# Patient Record
Sex: MALE | Race: WHITE | NOT HISPANIC OR LATINO | Employment: OTHER | URBAN - METROPOLITAN AREA
[De-identification: names, ages, dates, MRNs, and addresses within clinical notes are randomized per-mention and may not be internally consistent; named-entity substitution may affect disease eponyms.]

---

## 2017-04-11 ENCOUNTER — HOSPITAL ENCOUNTER (EMERGENCY)
Facility: CLINIC | Age: 22
Discharge: HOME | End: 2017-04-11
Attending: FAMILY MEDICINE | Admitting: FAMILY MEDICINE
Payer: COMMERCIAL

## 2017-04-11 VITALS
TEMPERATURE: 97.4 F | DIASTOLIC BLOOD PRESSURE: 76 MMHG | OXYGEN SATURATION: 97 % | RESPIRATION RATE: 16 BRPM | SYSTOLIC BLOOD PRESSURE: 144 MMHG

## 2017-04-11 DIAGNOSIS — K52.9 ACUTE GASTROENTERITIS: ICD-10-CM

## 2017-04-11 PROCEDURE — 25000125 ZZHC RX 250: Performed by: FAMILY MEDICINE

## 2017-04-11 PROCEDURE — 99283 EMERGENCY DEPT VISIT LOW MDM: CPT | Performed by: FAMILY MEDICINE

## 2017-04-11 PROCEDURE — 99283 EMERGENCY DEPT VISIT LOW MDM: CPT

## 2017-04-11 RX ORDER — ONDANSETRON 4 MG/1
4 TABLET, ORALLY DISINTEGRATING ORAL ONCE
Status: COMPLETED | OUTPATIENT
Start: 2017-04-11 | End: 2017-04-11

## 2017-04-11 RX ORDER — ONDANSETRON 4 MG/1
4 TABLET, ORALLY DISINTEGRATING ORAL EVERY 6 HOURS PRN
Qty: 5 TABLET | Refills: 0 | Status: SHIPPED | OUTPATIENT
Start: 2017-04-11 | End: 2018-03-13

## 2017-04-11 RX ADMIN — ONDANSETRON 4 MG: 4 TABLET, ORALLY DISINTEGRATING ORAL at 11:19

## 2017-04-11 ASSESSMENT — ENCOUNTER SYMPTOMS
CONSTIPATION: 0
VOMITING: 1
COUGH: 0
FREQUENCY: 0
SINUS PRESSURE: 0
FEVER: 0
ABDOMINAL PAIN: 1
DIARRHEA: 1
DIAPHORESIS: 0
SORE THROAT: 0
PALPITATIONS: 0
NAUSEA: 1
CHILLS: 1
WHEEZING: 0
DYSURIA: 0
BLOOD IN STOOL: 0
SHORTNESS OF BREATH: 0
HEADACHES: 0

## 2017-04-11 NOTE — ED PROVIDER NOTES
History     Chief Complaint   Patient presents with     Nausea, Vomiting, & Diarrhea     Started getting sick this morning around 0800.  Having abdominal pain since then too.  Pt has autism, difficult to assess, has high pain tolerance.  Ate normally yesterday.     HPI  Shola Owens is a 21 year old male with a history of autism and seizure disorder who presents with nausea, vomiting, and diarrhea. Patient is a difficult historian due to autism and  History is reported by his father and mother.  Yesterday the patient was feeling well. 3 episodes of vomiting followed by abdominal cramping and multiple diarrheal stools  overnght. emesis clear without blood. . Pepto bismol alleviated his symptoms slightly.  developed chills this morning.  cough for the last few weeks - relatively mild  No fever. No recent travel. No bad food intake.   No contagious contacts.   Immunizations are up to date.     Current Outpatient Prescriptions   Medication Sig Dispense Refill     lamoTRIgine (LAMICTAL) 100 MG tablet TAKE 1 TAB TWICE DAILY (beginning on 5/2/16). 60 tablet 11     divalproex (DEPAKOTE ER) 500 MG 24 hr tablet Take 500 mg in AM, 1000 mg in PM. 90 tablet 11     cetirizine (ZYRTEC) 10 MG tablet Take 1 tablet (10 mg) by mouth daily 100 tablet 3     Multiple Vitamin (MULTIVITAMINS PO)        guanFACINE (TENEX) 1 MG tablet Take 1 tablet (1 mg) by mouth 2 times daily 60 tablet 0     ziprasidone (GEODON) 40 MG capsule Take 1 capsule (40 mg) by mouth 2 times daily (with meals) AM 60 capsule 0     ziprasidone (GEODON) 80 MG capsule Take 1 capsule (80 mg) by mouth 2 times daily (with meals) AM AND PM 60 capsule 0     montelukast (SINGULAIR) 10 MG tablet Take 1 tablet (10 mg) by mouth At Bedtime 30 tablet 11     omeprazole (PRILOSEC) 20 MG capsule Take 1 capsule (20 mg) by mouth daily 30 capsule 11     Patient Active Problem List   Diagnosis     Active autistic disorder     Disturbance in sleep behavior     Seizure     Zygomatic  fracture, unspecified, initial encounter for closed fracture     Closed fracture of maxilla (H)        Allergies   Allergen Reactions     Risperidone Hives     I have reviewed the Medications, Allergies, Past Medical and Surgical History, and Social History in the Epic system.    Review of Systems   Constitutional: Positive for chills. Negative for diaphoresis and fever.   HENT: Negative for ear pain, sinus pressure and sore throat.    Eyes: Negative for visual disturbance.   Respiratory: Negative for cough, shortness of breath and wheezing.    Cardiovascular: Negative for chest pain and palpitations.   Gastrointestinal: Positive for abdominal pain, diarrhea, nausea and vomiting. Negative for blood in stool and constipation.   Genitourinary: Negative for dysuria, frequency and urgency.   Skin: Negative for rash.   Neurological: Negative for headaches.   All other systems reviewed and are negative.      Physical Exam   BP: 112/74  Heart Rate: 82  Temp: 97.4  F (36.3  C)  Resp: 16  SpO2: 100 %    Physical Exam   Constitutional: No distress.   HENT:   Right Ear: Tympanic membrane normal.   Left Ear: Tympanic membrane normal.   Mouth/Throat: Oropharynx is clear and moist and mucous membranes are normal.   Eyes: Conjunctivae are normal.   Neck: Neck supple.   Cardiovascular: Normal rate, regular rhythm and normal heart sounds.  Exam reveals no gallop and no friction rub.    No murmur heard.  Pulmonary/Chest: Effort normal and breath sounds normal. No respiratory distress. He has no wheezes. He has no rales.   Abdominal: Soft. Bowel sounds are normal. He exhibits no distension. There is no tenderness. There is no rebound and no guarding.   Musculoskeletal: He exhibits no edema.   Neurological: He is alert.   Skin: No rash noted. He is not diaphoretic.     Periodic chills.     ED Course     ED Course     Procedures             Critical Care time:  none               No results found for this or any previous visit (from the  past 24 hour(s)).    Medications   ondansetron (ZOFRAN-ODT) ODT tab 4 mg (4 mg Oral Given 4/11/17 1119)     11:31 AM patient assessed.     Assessments & Plan (with Medical Decision Making)     MDM: Shola Owens is a 21 year old male With a history of autism and accompanied by his family   who presents with diarrhea and vomiting and abdominal cramping. This is at onset over the evening hours and appears non-toxic and afebrile. He is alerts. After Zofran he continues to want to drink liquids.  has had stool incontinence.  There is no finding of dysentery has no blood in stools.   Likely food poisoning or viral gastroenteritis. We discussed obtaining labs but at this point there are no serious findings and is given precautions for return. Oral Zofran is sent to pharmacy.  He does have an underlying seizure disorder and is on several epilepsy agents including Depakote and Lamictal.  Levels Have been done relatively recently and were in range per family.        I have reviewed the nursing notes.    I have reviewed the findings, diagnosis, plan and need for follow up with the patient.    New Prescriptions    No medications on file       Final diagnoses:   Acute gastroenteritis - Use zofran orally every 6 hours as needed for nausea/vomiting.  replace diarrhea with electrolyte replacement solution (gatorade ok).  return for abd pain, bloody diarrhea, fever, worsening.  64 oz fluid/day     This document serves as a record of the services and decisions personally performed and made by Florencio Chapin MD. It was created on HIS/HER behalf by Kizzy Canela, a trained medical scribe. The creation of this document is based the provider's statements to the medical scribe.  Kizzy Canela 11:31 AM 4/11/2017    Provider:   The information in this document, created by the medical scribe for me, accurately reflects the services I personally performed and the decisions made by me. I have reviewed and approved this document for accuracy  prior to leaving the patient care area.  Florencio Chapin MD 11:31 AM 4/11/2017 4/11/2017   [facility] EMERGENCY DEPARTMENT     Florencio Chapin MD  04/11/17 2001       Florencio Chapin MD  04/11/17 2001

## 2017-04-11 NOTE — DISCHARGE INSTRUCTIONS
"  ICD-10-CM    1. Acute gastroenteritis K52.9     Use zofran orally every 6 hours as needed for nausea/vomiting.  replace diarrhea with electrolyte replacement solution (gatorade ok).  return for abd pain, bloody diarrhea, fever, worsening.  64 oz fluid/day          * FOOD POISONING or VIRAL GASTROENTERITIS (6yr-Adult)  FOOD POISONING may occur from 6 to 24 hours after eating food that has spoiled and lasts up to1-2 days. VIRAL GASTRO-ENTERITIS is commonly known as the \"stomach flu\" and may last 2-7 days. Symptoms of both illnesses may include vomiting, diarrhea, fever, abdominal cramping. Antibiotics are not effective, but simple home treatment will be helpful.  HOME CARE:    If symptoms are severe, rest at home for the next 24 hours.    Avoid tobacco and alcohol. These may worsen your symptoms.    If medicines for diarrhea (low dose of Immodium: one tablet a day for an adult) or vomiting were prescribed, take only as directed.   During the first 12 to 24 hours follow the diet below:    DRINKS: Sport drinks like Gatorade, soft drinks without caffeine; ginger ale, mineral water (plain or flavored), decaffeinated tea and coffee.    SOUPS: Clear broth, consommé and bouillon    DESSERTS: Plain gelatin (Jell-O), popsicles and fruit juice bars.  During the next 24 hours you may add the following to the above:    Hot cereal, plain toast, bread, rolls, crackers    Plain noodles, rice, mashed potatoes, chicken noodle or rice soup    Unsweetened canned fruit (avoid pineapple), bananas    Limit fat intake to less than 15 grams per day by avoiding margarine, butter, oils, mayonnaise, sauces, gravies, fried foods, peanut butter, meat, poultry and fish.    Limit fiber; avoid raw or cooked vegetables, fresh fruits (except bananas) and bran cereals.    Limit caffeine and chocolate. No spices or seasonings except salt.  Slowly go back to a normal diet as you feel better and your symptoms lessen.  FOLLOW UP with your doctor as " advised if you are not better in 2 days. If a stool (diarrhea) sample was taken, you may call in 2 days (or as directed) for the results.  GET PROMPT MEDICAL ATTENTION if any of the following occur:    Increasing abdominal pain or constant pain in one spot    Continued vomiting (unable to keep liquids down)    Frequent diarrhea (more than 5 times a day)    Blood in vomit or stool (black or red color)    Unable to take in fluids at all    No urine output for 12 hours or extreme thirst    Weakness, dizziness, fainting    Drowsiness, confusion, stiff neck or seizure    Fever over 101.0  F (38.3  C) for more than 3 days    New dorothea    8967-5463 [facility], [address] [place] [address], [place] [address]. All rights reserved. This information is not intended as a substitute for professional medical care. Always follow your healthcare professional's instructions.

## 2017-04-11 NOTE — ED AVS SNAPSHOT
[facility] Emergency Department    [address] [address]    Phone:  [phone]    Fax:  [phone]                                       Shola Owens   MRN: [id number]    Department:  [facility] Emergency Department   Date of Visit:  4/11/2017           After Visit Summary Signature Page     I have received my discharge instructions, and my questions have been answered. I have discussed any challenges I see with this plan with the nurse or doctor.    ..........................................................................................................................................  Patient/Patient Representative Signature      ..........................................................................................................................................  Patient Representative Print Name and Relationship to Patient    ..................................................               ................................................  Date                                            Time    ..........................................................................................................................................  Reviewed by Signature/Title    ...................................................              ..............................................  Date                                                            Time

## 2017-04-11 NOTE — ED AVS SNAPSHOT
" [facility] Emergency Department    [address] [address]    Phone:  [phone]    Fax:  [phone]                                       Shola Owens   MRN: [id number]    Department:  [facility] Emergency Department   Date of Visit:  4/11/2017           Patient Information     Date Of Birth          1995        Your diagnoses for this visit were:     Acute gastroenteritis Use zofran orally every 6 hours as needed for nausea/vomiting.  replace diarrhea with electrolyte replacement solution (gatorade ok).  return for abd pain, bloody diarrhea, fever, worsening.  64 oz fluid/day       You were seen by Florencio Chapin MD.      Follow-up Information     Follow up with Shola Nelson MD In 1 week.    Specialty:  Family Practice    Contact information:    [address] [address]  [phone]          Follow up with [facility] Emergency Department.    Specialty:  EMERGENCY MEDICINE    Why:  As needed, If symptoms worsen    Contact information:    [address] [place] [address] [phone]    Additional information:    The medical center is located at   [address] [place] [address] (between [address]   [address] in [place], four miles north   of [place]).        Discharge Instructions         ICD-10-CM    1. Acute gastroenteritis K52.9     Use zofran orally every 6 hours as needed for nausea/vomiting.  replace diarrhea with electrolyte replacement solution (gatorade ok).  return for abd pain, bloody diarrhea, fever, worsening.  64 oz fluid/day          * FOOD POISONING or VIRAL GASTROENTERITIS (6yr-Adult)  FOOD POISONING may occur from 6 to 24 hours after eating food that has spoiled and lasts up to1-2 days. VIRAL GASTRO-ENTERITIS is commonly known as the \"stomach flu\" and may last 2-7 days. Symptoms of both illnesses may include vomiting, diarrhea, fever, abdominal cramping. Antibiotics are not effective, but simple " home treatment will be helpful.  HOME CARE:    If symptoms are severe, rest at home for the next 24 hours.    Avoid tobacco and alcohol. These may worsen your symptoms.    If medicines for diarrhea (low dose of Immodium: one tablet a day for an adult) or vomiting were prescribed, take only as directed.   During the first 12 to 24 hours follow the diet below:    DRINKS: Sport drinks like Gatorade, soft drinks without caffeine; ginger ale, mineral water (plain or flavored), decaffeinated tea and coffee.    SOUPS: Clear broth, consommé and bouillon    DESSERTS: Plain gelatin (Jell-O), popsicles and fruit juice bars.  During the next 24 hours you may add the following to the above:    Hot cereal, plain toast, bread, rolls, crackers    Plain noodles, rice, mashed potatoes, chicken noodle or rice soup    Unsweetened canned fruit (avoid pineapple), bananas    Limit fat intake to less than 15 grams per day by avoiding margarine, butter, oils, mayonnaise, sauces, gravies, fried foods, peanut butter, meat, poultry and fish.    Limit fiber; avoid raw or cooked vegetables, fresh fruits (except bananas) and bran cereals.    Limit caffeine and chocolate. No spices or seasonings except salt.  Slowly go back to a normal diet as you feel better and your symptoms lessen.  FOLLOW UP with your doctor as advised if you are not better in 2 days. If a stool (diarrhea) sample was taken, you may call in 2 days (or as directed) for the results.  GET PROMPT MEDICAL ATTENTION if any of the following occur:    Increasing abdominal pain or constant pain in one spot    Continued vomiting (unable to keep liquids down)    Frequent diarrhea (more than 5 times a day)    Blood in vomit or stool (black or red color)    Unable to take in fluids at all    No urine output for 12 hours or extreme thirst    Weakness, dizziness, fainting    Drowsiness, confusion, stiff neck or seizure    Fever over 101.0  F (38.3  C) for more than 3 days    New rash     [id number] [facility], [address] [place] [address], [place] [address]. All rights reserved. This information is not intended as a substitute for professional medical care. Always follow your healthcare professional's instructions.      Future Appointments        Provider Department Dept Phone Center    6/15/2017 9:30 AM Austen Lemus MD [facility] [phone] [facility] Own[facility]      24 Hour Appointment Hotline       To make an appointment at any [facility], call [phone] ([phone]). If you don't have a family doctor or clinic, we will help you find one. [facility] clinics are conveniently located to serve the needs of you and your family.             Review of your medicines      START taking        Dose / Directions Last dose taken    ondansetron 4 MG ODT tab   Commonly known as:  ZOFRAN-ODT   Dose:  4 mg   Quantity:  5 tablet        Take 1 tablet (4 mg) by mouth every 6 hours as needed for nausea   Refills:  0          Our records show that you are taking the medicines listed below. If these are incorrect, please call your family doctor or clinic.        Dose / Directions Last dose taken    cetirizine 10 MG tablet   Commonly known as:  zyrTEC   Dose:  10 mg   Quantity:  100 tablet        Take 1 tablet (10 mg) by mouth daily   Refills:  3        divalproex 500 MG 24 hr tablet   Commonly known as:  DEPAKOTE ER   Quantity:  90 tablet        Take 500 mg in AM, 1000 mg in PM.   Refills:  11        guanFACINE 1 MG tablet   Commonly known as:  TENEX   Dose:  1 mg   Quantity:  60 tablet        Take 1 tablet (1 mg) by mouth 2 times daily   Refills:  0        lamoTRIgine 100 MG tablet   Commonly known as:  LAMICTAL   Quantity:  60 tablet        TAKE 1 TAB TWICE DAILY (beginning on 5/2/16).   Refills:  11        montelukast 10 MG tablet   Commonly known as:  SINGULAIR   Dose:  10 mg   Quantity:  30 tablet        Take 1 tablet (10 mg) by mouth At Bedtime   Refills:  11        MULTIVITAMINS PO        Refills:  0         omeprazole 20 MG CR capsule   Commonly known as:  priLOSEC   Dose:  20 mg   Quantity:  30 capsule        Take 1 capsule (20 mg) by mouth daily   Refills:  11        * ziprasidone 40 MG capsule   Commonly known as:  GEODON   Dose:  40 mg   Quantity:  60 capsule        Take 1 capsule (40 mg) by mouth 2 times daily (with meals) AM   Refills:  0        * ziprasidone 80 MG capsule   Commonly known as:  GEODON   Dose:  80 mg   Quantity:  60 capsule        Take 1 capsule (80 mg) by mouth 2 times daily (with meals) AM AND PM   Refills:  0        * Notice:  This list has 2 medication(s) that are the same as other medications prescribed for you. Read the directions carefully, and ask your doctor or other care provider to review them with you.            Prescriptions were sent or printed at these locations (1 Prescription)                   [id number] [place], [place] - [address]   [address] [address]    Telephone:  [phone]   Fax:  [phone]   Hours:  Closed 10-16-08 business to [facility]                E-Prescribed (1 of 1)         ondansetron (ZOFRAN-ODT) 4 MG ODT tab                Orders Needing Specimen Collection     None      Pending Results     No orders found from 4/9/2017 to 4/12/2017.            Pending Culture Results     No orders found from 4/9/2017 to 4/12/2017.            Test Results From Your Hospital Stay               Thank you for choosing [facility]       Thank you for choosing [facility] for your care. Our goal is always to provide you with excellent care. Hearing back from our patients is one way we can continue to improve our services. Please take a few minutes to complete the written survey that you may receive in the mail after you visit with us. Thank you!        [facility] Information     [employer] lets you send messages to your doctor, view your test results, renew your prescriptions, schedule appointments and more. To sign up, go to  [email or web address] . Click on \"Log in\" on the left side of the screen, which will take you to the Welcome page. Then click on \"Sign up Now\" on the right side of the page.     You will be asked to enter the access code listed below, as well as some personal information. Please follow the directions to create your username and password.     Your access code is: [email or web address]  Expires: 7/10/2017 12:00 PM     Your access code will  in 90 days. If you need help or a new code, please call your [facility] clinic or [phone].        Care EveryWhere ID     This is your Care EveryWhere ID. This could be used by other organizations to access your [facility] medical records  [id number]        After Visit Summary       This is your record. Keep this with you and show to your community pharmacist(s) and doctor(s) at your next visit.                  "

## 2017-04-11 NOTE — ED NOTES
Pt from home with mom and brother. Vomiting started @ 0800 and diarrhea. Pt c/o abdominal pain @ umbilicus. Mom concerned for appendicitis and unsure if able to keep epilepsy meds down. Brought in. Discussed pt's situation and autism hx with MD. No line started, will attempt oral zofran and fluids

## 2017-04-21 DIAGNOSIS — K21.9 GASTROESOPHAGEAL REFLUX DISEASE WITHOUT ESOPHAGITIS: ICD-10-CM

## 2017-04-21 DIAGNOSIS — J30.2 SEASONAL ALLERGIC RHINITIS: Primary | ICD-10-CM

## 2017-04-21 NOTE — TELEPHONE ENCOUNTER
Omeprazole      Last Written Prescription Date: 04/07/16  Last Fill Quantity: 30,  # refills: 11   Last Office Visit with [facility], [facility] or [facility] prescribing provider: 06/06/16  [place]       Last Written Prescription Date: 04/07/16  Last Fill Quantity: 30, # refills: 11    Last Office Visit with [facility], [facility] or [facility] prescribing provider:  06/06/16   Future Office Visit:       Date of Last Asthma Action Plan Letter:   There are no preventive care reminders to display for this patient.   Asthma Control Test: No flowsheet data found.    Date of Last Spirometry Test:   No results found for this or any previous visit.

## 2017-04-24 RX ORDER — MONTELUKAST SODIUM 10 MG/1
10 TABLET ORAL AT BEDTIME
Qty: 30 TABLET | Refills: 1 | Status: SHIPPED | OUTPATIENT
Start: 2017-04-24 | End: 2017-06-15

## 2017-04-24 NOTE — TELEPHONE ENCOUNTER
Prescription approved per [facility] Refill Protocol.    Usha NOBLE RN

## 2017-06-15 ENCOUNTER — OFFICE VISIT (OUTPATIENT)
Dept: NEUROLOGY | Facility: CLINIC | Age: 22
End: 2017-06-15

## 2017-06-15 VITALS
HEIGHT: 72 IN | HEART RATE: 58 BPM | SYSTOLIC BLOOD PRESSURE: 143 MMHG | DIASTOLIC BLOOD PRESSURE: 71 MMHG | BODY MASS INDEX: 27.79 KG/M2 | WEIGHT: 205.2 LBS

## 2017-06-15 DIAGNOSIS — G40.909 SEIZURE DISORDER (H): Primary | ICD-10-CM

## 2017-06-15 DIAGNOSIS — K21.9 GASTROESOPHAGEAL REFLUX DISEASE WITHOUT ESOPHAGITIS: ICD-10-CM

## 2017-06-15 RX ORDER — LAMOTRIGINE 100 MG/1
TABLET ORAL
Qty: 60 TABLET | Refills: 11 | Status: SHIPPED | OUTPATIENT
Start: 2017-06-15 | End: 2017-12-14

## 2017-06-15 RX ORDER — DIVALPROEX SODIUM 500 MG/1
TABLET, EXTENDED RELEASE ORAL
Qty: 90 TABLET | Refills: 11 | Status: SHIPPED | OUTPATIENT
Start: 2017-06-15 | End: 2017-12-14

## 2017-06-15 NOTE — PATIENT INSTRUCTIONS
Times of Days am pm        Medication Tablet Size Number of Tablets/Capsules Total Daily Dosage    Depakote 500   1 2        1500 mg    Lamictal  100 1 1        200 mg                                                                                                                 Carry this with you at all times.  CONTINUE TAKING YOUR OTHER MEDICATIONS AS PREVIOUSLY DIRECTED.      * * *Do not store medications in the bathroom.  Keep medications away from children!* * *

## 2017-06-15 NOTE — LETTER
6/15/2017       RE: Shola Owens  : 1995   MRN: [id number]      Dear Colleague,    Thank you for referring your patient, Shola Owens, to the [facility] at [facility]. Please see a copy of my visit note below.    CHIEF COMPLAINT: Seizures.    HISTORY OF PRESENT ILLNESS: This patient is a 22-year-old, right-handed male with a history of autistic spectrum disorder returns for follow up for seizure. He is accompanied by his father in the clinic today.  The patient himself cannot provide much history, and the majority of the history is provided by his father.  Patient had new onset seizure in 2015, another seizure 10 days after that,  both were described as GTCs.  He was initially started on keppra.  It was topped because of GI side effects.  Then he was started on Lamotrigine and Depakote was added later.  He is currently on Lamictal 100mg bid and Depakote 500-1000.  He had behavioral problems when he was on Lamictal 150mg bid.  Sine the last visit, he had no seizures.  His behavior has been really good since the last visit.  Family is really happy with the seizure and behavior control.  His last seizure was on 2016 that caused fractures of the right zygomatic arch, maxillary sinus and orbit.       According to the mother, on 2015, the patient was found at home on the kitchen floor. He was cyanotic. He had foaming at the mouth. He had some bleeding from the nose. He was unresponsive, and he had some tonic-clonic movement of his extremities. The clonic-tonic movement lasted for about 1 minute. The mother did not see the beginning of the event. There was no loss of bowel or bladder control. The patient did not bite his tongue. Afterwards the patient was very lethargic, still unresponsive until he was in the ambulance; then he was able to recognize his father and was able to communicate a little bit.    The patient had no prior history of seizures. He  has been taking Geodon for 7 years. No recent medication changes. No recent illness. When EMS arrived at the scene, the blood sugar was found to be 81.     TRIGGERS FOR SEIZURES: Unclear.    RISK FACTORS FOR SEIZURES: He has a history of autistic spectrum disorder. No history of head trauma with loss of consciousness. No history of CNS infection. No history of febrile convulsions.     Current Outpatient Prescriptions   Medication Sig Dispense Refill     montelukast (SINGULAIR) 10 MG tablet Take 1 tablet (10 mg) by mouth At Bedtime 30 tablet 1     omeprazole (PRILOSEC) 20 MG CR capsule Take 1 capsule (20 mg) by mouth daily 30 capsule 1     ondansetron (ZOFRAN-ODT) 4 MG ODT tab Take 1 tablet (4 mg) by mouth every 6 hours as needed for nausea 5 tablet 0     lamoTRIgine (LAMICTAL) 100 MG tablet TAKE 1 TAB TWICE DAILY (beginning on 5/2/16). 60 tablet 11     divalproex (DEPAKOTE ER) 500 MG 24 hr tablet Take 500 mg in AM, 1000 mg in PM. 90 tablet 11     cetirizine (ZYRTEC) 10 MG tablet Take 1 tablet (10 mg) by mouth daily 100 tablet 3     Multiple Vitamin (MULTIVITAMINS PO)        guanFACINE (TENEX) 1 MG tablet Take 1 tablet (1 mg) by mouth 2 times daily 60 tablet 0     ziprasidone (GEODON) 40 MG capsule Take 1 capsule (40 mg) by mouth 2 times daily (with meals) AM 60 capsule 0     ziprasidone (GEODON) 80 MG capsule Take 1 capsule (80 mg) by mouth 2 times daily (with meals) AM AND PM 60 capsule 0       PAST MEDICAL HISTORY: Autistic disorder, sleep disturbances.    FAMILY HISTORY: No family history of seizures. Grandmother had a history of migraine headaches. Mother had a history of anxiety and panic attacks. Father had a history of chemical dependency.    SOCIAL HISTORY: He is living with his parents. He had special education. He is single. No alcohol, no drug abuse, no smoking. He is not working.    PHYSICAL EXAMINATION:   Blood pressure 143/71, pulse 58, height 6' (182.9 cm), weight 205 lb 3.2 oz (93.1 kg).    General  exam: General Appearance: No acute distress. HEENT: Normocephalic, atramatic. Neck: Supple.  Extremities: No edema, no clubbing, no cyanosis.     Neurologic Exam: Alert and oriented x3. Patient is shy and had paucity of speech. Cranial Nerves: Pupils are equal, round, reactive to light and accomodation. Extraocular movement intact. No facial weakness or asymmetry. Hearing normal. Motor Exam: Normal. Coordination:no ataxia.  Gait and Station: normal.      REVIEW OF SYSTEMS:  Positive for intermittent behavioral issues.  Otherwise an 8 point review of systems is negative.     PREVIOUS DIAGNOSTIC TESTING: MRI of the brain on 04/30 showed a negative study. EEG on 05/01 showed a normal study.     IMPRESSION:    1. New-onset seizure.  This patient is a 22-year-old, right-handed male with a history of autistic spectrum disorder returns for follow up for seizure. He is accompanied by his father in the clinic today.  The patient himself cannot provide much history, and the majority of the history is provided by his father.  Patient had new onset seizure in April 2015, another seizure 10 days after that,  both were described as GTCs.  He was initially started on keppra.  It was topped because of GI side effects.  Then he was started on Lamotrigine and Depakote was added later.  He is currently on Lamictal 100mg bid and Depakote 500-1000.  He had behavioral problems when he was on Lamictal 150mg bid.  Sine the last visit, he had no seizures.  His behavior has been really good since the last visit.  Family is really happy with the seizure and behavior control.  His last seizure was on 4/12/2016 that caused fractures of the right zygomatic arch, maxillary sinus and orbit.    2. Autistic disorder.    3. Sleep disturbances.     PLAN:    1. Continue Lamotrigine 100 mg bid and Depakote 500 - 1000.  2. Check Lamictal and Depakote levels.  3. Return to clinic in 6 months.      25 min was spent on the visit.  Over 50% of the time was  spent on education, counseling about optimal seizure control and coordination of care.      Again, thank you for allowing me to participate in the care of your patient.      Sincerely,    Austen Lemus MD

## 2017-06-15 NOTE — MR AVS SNAPSHOT
After Visit Summary   6/15/2017    Shola Owens    MRN: [id number]           Patient Information     Date Of Birth          1995        Visit Information        Provider Department      6/15/2017 9:30 AM Austen Lemus MD [facility]        Today's Diagnoses     Seizure disorder (H)    -  1      Care Instructions      Times of Days am pm        Medication Tablet Size Number of Tablets/Capsules Total Daily Dosage    Depakote 500   1 2        1500 mg    Lamictal  100 1 1        200 mg                                                                                                                 Carry this with you at all times.  CONTINUE TAKING YOUR OTHER MEDICATIONS AS PREVIOUSLY DIRECTED.      * * *Do not store medications in the bathroom.  Keep medications away from children!* * *             Follow-ups after your visit        Follow-up notes from your care team     Return in about 6 months (around 12/15/2017).      Your next 10 appointments already scheduled     Dec 14, 2017 10:00 AM CST   Return Visit with MD [facility] ([facility])    [address], [address]  [facility] [address] [phone]              Who to contact     Please call your clinic at [phone] to:    Ask questions about your health    Make or cancel appointments    Discuss your medicines    Learn about your test results    Speak to your doctor   If you have compliments or concerns about an experience at your clinic, or if you wish to file a complaint, please contact [facility] Patient Relations at [phone] or email us at [email or web address]         Additional Information About Your Visit        MyChart Information     [employer] is an electronic gateway that provides easy, online access to your medical records. With [employer], you can request a clinic appointment, read your test results, renew a prescription or communicate with  your care team.     To sign up for [employer] visit the website at [email or web address]   You will be asked to enter the access code listed below, as well as some personal information. Please follow the directions to create your username and password.     Your access code is: [email or web address]  Expires: 7/10/2017 12:00 PM     Your access code will  in 90 days. If you need help or a new code, please contact your [facility] or call [phone] for assistance.        Care EveryWhere ID     This is your Care EveryWhere ID. This could be used by other organizations to access your [place] medical records  [id number]        Your Vitals Were     Pulse Height BMI (Body Mass Index)             58 6' (182.9 cm) 27.83 kg/m2          Blood Pressure from Last 3 Encounters:   06/15/17 143/71   17 144/76   12/15/16 131/67    Weight from Last 3 Encounters:   06/15/17 205 lb 3.2 oz (93.1 kg)   12/15/16 194 lb 3.2 oz (88.1 kg)   09/15/16 189 lb 12.8 oz (86.1 kg)              We Performed the Following     Lamotrigine Level     Valproic acid          Where to get your medicines      These medications were sent to [id number] - [facility] [address]  [address] [address]    Hours:  Closed 10-16-08 business to [facility] Phone:  [phone]     divalproex 500 MG 24 hr tablet    lamoTRIgine 100 MG tablet          Primary Care Provider Office Phone # Fax #    Shola Nelson [phone]       [facility] [address] [address]        Thank you!     Thank you for choosing [facility]  for your care. Our goal is always to provide you with excellent care. Hearing back from our patients is one way we can continue to improve our services. Please take a few minutes to complete the written survey that you may receive in the mail after your visit with us. Thank you!             Your Updated  Medication List - Protect others around you: Learn how to safely use, store and throw away your medicines at www.disposemymeds.org.          This list is accurate as of: 6/15/17  9:47 AM.  Always use your most recent med list.                   Brand Name Dispense Instructions for use    cetirizine 10 MG tablet    zyrTEC    100 tablet    Take 1 tablet (10 mg) by mouth daily       divalproex 500 MG 24 hr tablet    DEPAKOTE ER    90 tablet    Take 500 mg in AM, 1000 mg in PM.       guanFACINE 1 MG tablet    TENEX    60 tablet    Take 1 tablet (1 mg) by mouth 2 times daily       lamoTRIgine 100 MG tablet    LAMICTAL    60 tablet    TAKE 1 TAB TWICE DAILY (beginning on 5/2/16).       montelukast 10 MG tablet    SINGULAIR    30 tablet    Take 1 tablet (10 mg) by mouth At Bedtime       MULTIVITAMINS PO          omeprazole 20 MG CR capsule    priLOSEC    30 capsule    Take 1 capsule (20 mg) by mouth daily       ondansetron 4 MG ODT tab    ZOFRAN-ODT    5 tablet    Take 1 tablet (4 mg) by mouth every 6 hours as needed for nausea       * ziprasidone 40 MG capsule    GEODON    60 capsule    Take 1 capsule (40 mg) by mouth 2 times daily (with meals) AM       * ziprasidone 80 MG capsule    GEODON    60 capsule    Take 1 capsule (80 mg) by mouth 2 times daily (with meals) AM AND PM       * Notice:  This list has 2 medication(s) that are the same as other medications prescribed for you. Read the directions carefully, and ask your doctor or other care provider to review them with you.

## 2017-06-15 NOTE — TELEPHONE ENCOUNTER
Omeprazole      Last Written Prescription Date: 04/24/17  Last Fill Quantity: 30,  # refills: 1   Last Office Visit with [facility] or [facility] prescribing provider: 06/06/16                                               Montelukast       Last Written Prescription Date: 04/24/17  Last Fill Quantity: 30, # refills: 1    Last Office Visit with [facility], [facility] or [facility] prescribing provider:  06/06/16   Future Office Visit:       Date of Last Asthma Action Plan Letter:   There are no preventive care reminders to display for this patient.   Asthma Control Test: No flowsheet data found.    Date of Last Spirometry Test:   No results found for this or any previous visit.

## 2017-06-15 NOTE — PROGRESS NOTES
CHIEF COMPLAINT: Seizures.    HISTORY OF PRESENT ILLNESS: This patient is a 22-year-old, right-handed male with a history of autistic spectrum disorder returns for follow up for seizure. He is accompanied by his father in the clinic today.  The patient himself cannot provide much history, and the majority of the history is provided by his father.  Patient had new onset seizure in April 2015, another seizure 10 days after that,  both were described as GTCs.  He was initially started on keppra.  It was topped because of GI side effects.  Then he was started on Lamotrigine and Depakote was added later.  He is currently on Lamictal 100mg bid and Depakote 500-1000.  He had behavioral problems when he was on Lamictal 150mg bid.  Sine the last visit, he had no seizures.  His behavior has been really good since the last visit.  Family is really happy with the seizure and behavior control.  His last seizure was on 4/12/2016 that caused fractures of the right zygomatic arch, maxillary sinus and orbit.       According to the mother, on 04/30/2015, the patient was found at home on the kitchen floor. He was cyanotic. He had foaming at the mouth. He had some bleeding from the nose. He was unresponsive, and he had some tonic-clonic movement of his extremities. The clonic-tonic movement lasted for about 1 minute. The mother did not see the beginning of the event. There was no loss of bowel or bladder control. The patient did not bite his tongue. Afterwards the patient was very lethargic, still unresponsive until he was in the ambulance; then he was able to recognize his father and was able to communicate a little bit.    The patient had no prior history of seizures. He has been taking Geodon for 7 years. No recent medication changes. No recent illness. When EMS arrived at the scene, the blood sugar was found to be 81.     TRIGGERS FOR SEIZURES: Unclear.    RISK FACTORS FOR SEIZURES: He has a history of autistic spectrum disorder.  No history of head trauma with loss of consciousness. No history of CNS infection. No history of febrile convulsions.       Current Outpatient Prescriptions   Medication Sig Dispense Refill     montelukast (SINGULAIR) 10 MG tablet Take 1 tablet (10 mg) by mouth At Bedtime 30 tablet 1     omeprazole (PRILOSEC) 20 MG CR capsule Take 1 capsule (20 mg) by mouth daily 30 capsule 1     ondansetron (ZOFRAN-ODT) 4 MG ODT tab Take 1 tablet (4 mg) by mouth every 6 hours as needed for nausea 5 tablet 0     lamoTRIgine (LAMICTAL) 100 MG tablet TAKE 1 TAB TWICE DAILY (beginning on 5/2/16). 60 tablet 11     divalproex (DEPAKOTE ER) 500 MG 24 hr tablet Take 500 mg in AM, 1000 mg in PM. 90 tablet 11     cetirizine (ZYRTEC) 10 MG tablet Take 1 tablet (10 mg) by mouth daily 100 tablet 3     Multiple Vitamin (MULTIVITAMINS PO)        guanFACINE (TENEX) 1 MG tablet Take 1 tablet (1 mg) by mouth 2 times daily 60 tablet 0     ziprasidone (GEODON) 40 MG capsule Take 1 capsule (40 mg) by mouth 2 times daily (with meals) AM 60 capsule 0     ziprasidone (GEODON) 80 MG capsule Take 1 capsule (80 mg) by mouth 2 times daily (with meals) AM AND PM 60 capsule 0       PAST MEDICAL HISTORY: Autistic disorder, sleep disturbances.    FAMILY HISTORY: No family history of seizures. Grandmother had a history of migraine headaches. Mother had a history of anxiety and panic attacks. Father had a history of chemical dependency.    SOCIAL HISTORY: He is living with his parents. He had special education. He is single. No alcohol, no drug abuse, no smoking. He is not working.    PHYSICAL EXAMINATION:   Blood pressure 143/71, pulse 58, height 6' (182.9 cm), weight 205 lb 3.2 oz (93.1 kg).    General exam: General Appearance: No acute distress. HEENT: Normocephalic, atramatic. Neck: Supple.  Extremities: No edema, no clubbing, no cyanosis.     Neurologic Exam: Alert and oriented x3. Patient is shy and had paucity of speech. Cranial Nerves: Pupils are equal,  round, reactive to light and accomodation. Extraocular movement intact. No facial weakness or asymmetry. Hearing normal. Motor Exam: Normal. Coordination:no ataxia.  Gait and Station: normal.      REVIEW OF SYSTEMS:  Positive for intermittent behavioral issues.  Otherwise an 8 point review of systems is negative.     PREVIOUS DIAGNOSTIC TESTING: MRI of the brain on 04/30 showed a negative study. EEG on 05/01 showed a normal study.     IMPRESSION:    1. New-onset seizure.  This patient is a 22-year-old, right-handed male with a history of autistic spectrum disorder returns for follow up for seizure. He is accompanied by his father in the clinic today.  The patient himself cannot provide much history, and the majority of the history is provided by his father.  Patient had new onset seizure in April 2015, another seizure 10 days after that,  both were described as GTCs.  He was initially started on keppra.  It was topped because of GI side effects.  Then he was started on Lamotrigine and Depakote was added later.  He is currently on Lamictal 100mg bid and Depakote 500-1000.  He had behavioral problems when he was on Lamictal 150mg bid.  Sine the last visit, he had no seizures.  His behavior has been really good since the last visit.  Family is really happy with the seizure and behavior control.  His last seizure was on 4/12/2016 that caused fractures of the right zygomatic arch, maxillary sinus and orbit.    2. Autistic disorder.    3. Sleep disturbances.     PLAN:    1. Continue Lamotrigine 100 mg bid and Depakote 500 - 1000.  2. Check Lamictal and Depakote levels.  3. Return to clinic in 6 months.      25 min was spent on the visit.  Over 50% of the time was spent on education, counseling about optimal seizure control and coordination of care.

## 2017-06-16 LAB — VALPROATE SERPL-MCNC: 63 MG/L (ref 50–100)

## 2017-06-17 LAB — LAMOTRIGINE SERPL-MCNC: 7.3 UG/ML

## 2017-06-21 RX ORDER — MONTELUKAST SODIUM 10 MG/1
10 TABLET ORAL AT BEDTIME
Qty: 30 TABLET | Refills: 0 | Status: SHIPPED | OUTPATIENT
Start: 2017-06-21 | End: 2017-07-13

## 2017-06-21 NOTE — TELEPHONE ENCOUNTER
Due for an office visit. Left message for patient to return call. Sent a 30 day supply in.   Yuly Bassett RN

## 2017-07-13 DIAGNOSIS — K21.9 GASTROESOPHAGEAL REFLUX DISEASE WITHOUT ESOPHAGITIS: ICD-10-CM

## 2017-07-13 NOTE — TELEPHONE ENCOUNTER
Omeprazole    Last Written Prescription Date: 06/21/17  Last Fill Quantity: 30,  # refills: 0   Last Office Visit with [facility] or [facility] prescribing provider: 06/06/16  Montelukast      Last Written Prescription Date: 06/21/17  Last Fill Quantity: 30, # refills: 0    Last Office Visit with [facility], [facility] or [facility] prescribing provider:  06/06/16   Future Office Visit:       Date of Last Asthma Action Plan Letter:   There are no preventive care reminders to display for this patient.   Asthma Control Test: No flowsheet data found.    Date of Last Spirometry Test:   No results found for this or any previous visit.

## 2017-07-24 NOTE — TELEPHONE ENCOUNTER
Needs follow-up appointment for this medication. Too early to call. Please call mom and notify needs to be seen in clinic.    Amy Chong RN

## 2017-07-24 NOTE — TELEPHONE ENCOUNTER
Left message for mother to return call to clinic.  Needs appointment for refills.  Was already given the meghna refill last month.    Lavonne Soto RN

## 2017-07-25 NOTE — TELEPHONE ENCOUNTER
"Left message for Mom. \"calling about some refills requested for Shola, he is due to be seen, please call and schedule him an appt at [phone].\"    Will keep open to be sure he is scheduled.   Sonia Gallagher RNC    "

## 2017-07-28 RX ORDER — MONTELUKAST SODIUM 10 MG/1
10 TABLET ORAL AT BEDTIME
Qty: 20 TABLET | Refills: 0 | Status: SHIPPED | OUTPATIENT
Start: 2017-07-28 | End: 2020-08-18

## 2017-07-28 NOTE — TELEPHONE ENCOUNTER
Routing refill request to provider for review/approval because:  Patient is due for appt.  Has been given meghna period refill.  Unable to reach mom to notify patient is due for appt.    Meds requested - omeprazole and singulair.    Routing to provider.  Mena MARC RN

## 2017-09-08 DIAGNOSIS — K21.9 GASTROESOPHAGEAL REFLUX DISEASE WITHOUT ESOPHAGITIS: ICD-10-CM

## 2017-09-08 NOTE — TELEPHONE ENCOUNTER
Omeprazole     Last Written Prescription Date: 07/28/17  Last Fill Quantity: 20,  # refills: 0   Last Office Visit with [facility] or [facility] prescribing provider: 06/06/16  Montelukast      Last Written Prescription Date: 07/28/17  Last Fill Quantity: 20, # refills: 0    Last Office Visit with [facility], [facility] or [facility] prescribing provider:  06/06/16   Future Office Visit:       Date of Last Asthma Action Plan Letter:   There are no preventive care reminders to display for this patient.   Asthma Control Test: No flowsheet data found.    Date of Last Spirometry Test:   No results found for this or any previous visit.

## 2017-09-15 RX ORDER — MONTELUKAST SODIUM 10 MG/1
TABLET ORAL
Qty: 20 TABLET | Refills: 0 | OUTPATIENT
Start: 2017-09-15

## 2017-09-15 NOTE — TELEPHONE ENCOUNTER
Per last refill note from 7/13/17.  Patient needed appointment for refills and mother had been notified of this.  Pharmacy notified.    Lavonne Soto RN

## 2017-12-14 ENCOUNTER — OFFICE VISIT (OUTPATIENT)
Dept: NEUROLOGY | Facility: CLINIC | Age: 22
End: 2017-12-14
Payer: MEDICAID

## 2017-12-14 VITALS
WEIGHT: 194.8 LBS | HEIGHT: 72 IN | SYSTOLIC BLOOD PRESSURE: 120 MMHG | DIASTOLIC BLOOD PRESSURE: 61 MMHG | HEART RATE: 58 BPM | BODY MASS INDEX: 26.38 KG/M2

## 2017-12-14 DIAGNOSIS — G40.909 SEIZURE DISORDER (H): ICD-10-CM

## 2017-12-14 RX ORDER — LAMOTRIGINE 100 MG/1
TABLET ORAL
Qty: 60 TABLET | Refills: 11 | Status: SHIPPED | OUTPATIENT
Start: 2017-12-14 | End: 2018-11-15

## 2017-12-14 RX ORDER — LAMOTRIGINE 100 MG/1
TABLET ORAL
Qty: 30 TABLET | Refills: 0 | Status: SHIPPED | OUTPATIENT
Start: 2017-12-14 | End: 2019-11-14

## 2017-12-14 RX ORDER — DIVALPROEX SODIUM 500 MG/1
TABLET, EXTENDED RELEASE ORAL
Qty: 45 TABLET | Refills: 0 | Status: SHIPPED | OUTPATIENT
Start: 2017-12-14 | End: 2019-11-14

## 2017-12-14 RX ORDER — DIVALPROEX SODIUM 500 MG/1
TABLET, EXTENDED RELEASE ORAL
Qty: 90 TABLET | Refills: 11 | Status: SHIPPED | OUTPATIENT
Start: 2017-12-14 | End: 2018-11-15

## 2017-12-14 NOTE — PROGRESS NOTES
CHIEF COMPLAINT: Seizures.    HISTORY OF PRESENT ILLNESS: This patient is a 22-year-old, right-handed male with a history of autistic spectrum disorder returns for follow up for seizure. He is accompanied by his father in the clinic today.  The patient himself cannot provide much history, and the majority of the history is provided by his father.  Patient had new onset seizure in April 2015, another seizure 10 days after that,  both were described as GTCs.  He was initially started on keppra.  It was topped because of GI side effects.  Then he was started on Lamotrigine and Depakote was added later.  He is currently on Lamictal 100mg bid and Depakote 500-1000.  He had behavioral problems when he was on Lamictal 150mg bid.  Sine the last visit, he had no seizures.  His behavior has been really good since the last visit.  His father is really happy with the seizure and behavior control. His father is arranging his medications and he is compliant with the meds. No side effects were reported.  His last seizure was on 4/12/2016 that caused fractures of the right zygomatic arch, maxillary sinus and orbit.       According to the mother, on 04/30/2015, the patient was found at home on the kitchen floor. He was cyanotic. He had foaming at the mouth. He had some bleeding from the nose. He was unresponsive, and he had some tonic-clonic movement of his extremities. The clonic-tonic movement lasted for about 1 minute. The mother did not see the beginning of the event. There was no loss of bowel or bladder control. The patient did not bite his tongue. Afterwards the patient was very lethargic, still unresponsive until he was in the ambulance; then he was able to recognize his father and was able to communicate a little bit.    The patient had no prior history of seizures. He has been taking Geodon for 7 years. No recent medication changes. No recent illness. When EMS arrived at the scene, the blood sugar was found to be 81.      TRIGGERS FOR SEIZURES: Unclear.    RISK FACTORS FOR SEIZURES: He has a history of autistic spectrum disorder. No history of head trauma with loss of consciousness. No history of CNS infection. No history of febrile convulsions.       Current Outpatient Prescriptions   Medication Sig Dispense Refill     divalproex (DEPAKOTE ER) 500 MG 24 hr tablet Take 500 mg in AM, 1000 mg in PM. 90 tablet 11     lamoTRIgine (LAMICTAL) 100 MG tablet TAKE 1 TAB TWICE DAILY (beginning on 5/2/16). 60 tablet 11     cetirizine (ZYRTEC) 10 MG tablet Take 1 tablet (10 mg) by mouth daily 100 tablet 3     Multiple Vitamin (MULTIVITAMINS PO)        guanFACINE (TENEX) 1 MG tablet Take 1 tablet (1 mg) by mouth 2 times daily 60 tablet 0     ziprasidone (GEODON) 40 MG capsule Take 1 capsule (40 mg) by mouth 2 times daily (with meals) AM 60 capsule 0     ziprasidone (GEODON) 80 MG capsule Take 1 capsule (80 mg) by mouth 2 times daily (with meals) AM AND PM 60 capsule 0     montelukast (SINGULAIR) 10 MG tablet Take 1 tablet (10 mg) by mouth At Bedtime (Patient not taking: Reported on 12/14/2017) 20 tablet 0     omeprazole (PRILOSEC) 20 MG CR capsule Take 1 capsule (20 mg) by mouth daily (Patient not taking: Reported on 12/14/2017) 20 capsule 0     ondansetron (ZOFRAN-ODT) 4 MG ODT tab Take 1 tablet (4 mg) by mouth every 6 hours as needed for nausea 5 tablet 0       PAST MEDICAL HISTORY: Autistic disorder, sleep disturbances.    FAMILY HISTORY: No family history of seizures. Grandmother had a history of migraine headaches. Mother had a history of anxiety and panic attacks. Father had a history of chemical dependency.    SOCIAL HISTORY: He is living with his parents. He had special education. He is single. No alcohol, no drug abuse, no smoking. He is not working.    PHYSICAL EXAMINATION:   Blood pressure 120/61, pulse 58, height 6' (182.9 cm), weight 194 lb 12.8 oz (88.4 kg).    General exam: General Appearance: No acute distress. HEENT:  Normocephalic, atramatic. Neck: Supple.  Extremities: No edema, no clubbing, no cyanosis.     Neurologic Exam: Alert and oriented x3. Patient is shy and had paucity of speech. Cranial Nerves: Pupils are equal, round, reactive to light and accomodation. Extraocular movement intact. No facial weakness or asymmetry. Hearing normal. Motor Exam: Normal. Coordination:no ataxia.  Gait and Station: normal.      REVIEW OF SYSTEMS:  Positive for intermittent behavioral issues.  Otherwise an 8 point review of systems is negative.     PREVIOUS DIAGNOSTIC TESTING: MRI of the brain on 04/30 showed a negative study. EEG on 05/01 showed a normal study.     IMPRESSION:    1. New-onset seizure.  This patient is a 22-year-old, right-handed male with a history of autistic spectrum disorder returns for follow up for seizure. He is accompanied by his father in the clinic today.  The patient himself cannot provide much history, and the majority of the history is provided by his father.  Patient had new onset seizure in April 2015, another seizure 10 days after that,  both were described as GTCs.  He was initially started on keppra.  It was topped because of GI side effects.  Then he was started on Lamotrigine and Depakote was added later.  He is currently on Lamictal 100mg bid and Depakote 500-1000.  He had behavioral problems when he was on Lamictal 150mg bid.  Sine the last visit, he had no seizures.  His behavior has been really good since the last visit.  His father is really happy with the seizure and behavior control. His father is arranging his medications and he is compliant with the meds. No side effects were reported.  His last seizure was on 4/12/2016 that caused fractures of the right zygomatic arch, maxillary sinus and orbit.    2. Autistic disorder.    3. Sleep disturbances.     PLAN:    1. Continue Lamotrigine 100 mg bid and Depakote 500 - 1000.  2. Return to clinic in 12 months.

## 2017-12-14 NOTE — LETTER
2017       RE: Shola Owens  : 1995   MRN: [id number]      Dear Colleague,    Thank you for referring your patient, Shola Owens, to the [facility] at [facility]. Please see a copy of my visit note below.    CHIEF COMPLAINT: Seizures.    HISTORY OF PRESENT ILLNESS: This patient is a 22-year-old, right-handed male with a history of autistic spectrum disorder returns for follow up for seizure. He is accompanied by his father in the clinic today.  The patient himself cannot provide much history, and the majority of the history is provided by his father.  Patient had new onset seizure in 2015, another seizure 10 days after that,  both were described as GTCs.  He was initially started on keppra.  It was topped because of GI side effects.  Then he was started on Lamotrigine and Depakote was added later.  He is currently on Lamictal 100mg bid and Depakote 500-1000.  He had behavioral problems when he was on Lamictal 150mg bid.  Sine the last visit, he had no seizures.  His behavior has been really good since the last visit.  His father is really happy with the seizure and behavior control. His father is arranging his medications and he is compliant with the meds. No side effects were reported.  His last seizure was on 2016 that caused fractures of the right zygomatic arch, maxillary sinus and orbit.       According to the mother, on 2015, the patient was found at home on the kitchen floor. He was cyanotic. He had foaming at the mouth. He had some bleeding from the nose. He was unresponsive, and he had some tonic-clonic movement of his extremities. The clonic-tonic movement lasted for about 1 minute. The mother did not see the beginning of the event. There was no loss of bowel or bladder control. The patient did not bite his tongue. Afterwards the patient was very lethargic, still unresponsive until he was in the ambulance; then he was able to  recognize his father and was able to communicate a little bit.    The patient had no prior history of seizures. He has been taking Geodon for 7 years. No recent medication changes. No recent illness. When EMS arrived at the scene, the blood sugar was found to be 81.     TRIGGERS FOR SEIZURES: Unclear.    RISK FACTORS FOR SEIZURES: He has a history of autistic spectrum disorder. No history of head trauma with loss of consciousness. No history of CNS infection. No history of febrile convulsions.       Current Outpatient Prescriptions   Medication Sig Dispense Refill     divalproex (DEPAKOTE ER) 500 MG 24 hr tablet Take 500 mg in AM, 1000 mg in PM. 90 tablet 11     lamoTRIgine (LAMICTAL) 100 MG tablet TAKE 1 TAB TWICE DAILY (beginning on 5/2/16). 60 tablet 11     cetirizine (ZYRTEC) 10 MG tablet Take 1 tablet (10 mg) by mouth daily 100 tablet 3     Multiple Vitamin (MULTIVITAMINS PO)        guanFACINE (TENEX) 1 MG tablet Take 1 tablet (1 mg) by mouth 2 times daily 60 tablet 0     ziprasidone (GEODON) 40 MG capsule Take 1 capsule (40 mg) by mouth 2 times daily (with meals) AM 60 capsule 0     ziprasidone (GEODON) 80 MG capsule Take 1 capsule (80 mg) by mouth 2 times daily (with meals) AM AND PM 60 capsule 0     montelukast (SINGULAIR) 10 MG tablet Take 1 tablet (10 mg) by mouth At Bedtime (Patient not taking: Reported on 12/14/2017) 20 tablet 0     omeprazole (PRILOSEC) 20 MG CR capsule Take 1 capsule (20 mg) by mouth daily (Patient not taking: Reported on 12/14/2017) 20 capsule 0     ondansetron (ZOFRAN-ODT) 4 MG ODT tab Take 1 tablet (4 mg) by mouth every 6 hours as needed for nausea 5 tablet 0       PAST MEDICAL HISTORY: Autistic disorder, sleep disturbances.    FAMILY HISTORY: No family history of seizures. Grandmother had a history of migraine headaches. Mother had a history of anxiety and panic attacks. Father had a history of chemical dependency.    SOCIAL HISTORY: He is living with his parents. He had special  education. He is single. No alcohol, no drug abuse, no smoking. He is not working.    PHYSICAL EXAMINATION:   Blood pressure 120/61, pulse 58, height 6' (182.9 cm), weight 194 lb 12.8 oz (88.4 kg).    General exam: General Appearance: No acute distress. HEENT: Normocephalic, atramatic. Neck: Supple.  Extremities: No edema, no clubbing, no cyanosis.     Neurologic Exam: Alert and oriented x3. Patient is shy and had paucity of speech. Cranial Nerves: Pupils are equal, round, reactive to light and accomodation. Extraocular movement intact. No facial weakness or asymmetry. Hearing normal. Motor Exam: Normal. Coordination:no ataxia.  Gait and Station: normal.      REVIEW OF SYSTEMS:  Positive for intermittent behavioral issues.  Otherwise an 8 point review of systems is negative.     PREVIOUS DIAGNOSTIC TESTING: MRI of the brain on 04/30 showed a negative study. EEG on 05/01 showed a normal study.     IMPRESSION:    1. New-onset seizure.  This patient is a 22-year-old, right-handed male with a history of autistic spectrum disorder returns for follow up for seizure. He is accompanied by his father in the clinic today.  The patient himself cannot provide much history, and the majority of the history is provided by his father.  Patient had new onset seizure in April 2015, another seizure 10 days after that,  both were described as GTCs.  He was initially started on keppra.  It was topped because of GI side effects.  Then he was started on Lamotrigine and Depakote was added later.  He is currently on Lamictal 100mg bid and Depakote 500-1000.  He had behavioral problems when he was on Lamictal 150mg bid.  Sine the last visit, he had no seizures.  His behavior has been really good since the last visit.  His father is really happy with the seizure and behavior control. His father is arranging his medications and he is compliant with the meds. No side effects were reported.  His last seizure was on 4/12/2016 that caused  fractures of the right zygomatic arch, maxillary sinus and orbit.    2. Autistic disorder.    3. Sleep disturbances.     PLAN:    1. Continue Lamotrigine 100 mg bid and Depakote 500 - 1000.  2. Return to clinic in 12 months.      Sincerely,    Austen Lemus MD

## 2017-12-14 NOTE — MR AVS SNAPSHOT
After Visit Summary   12/14/2017    Shola Owens    MRN: [id number]           Patient Information     Date Of Birth          1995        Visit Information        Provider Department      12/14/2017 10:00 AM Austen Lemus MD [facility]        Today's Diagnoses     Seizure disorder (H)          Care Instructions      Times of Days  am pm        Medication Tablet Size Number of Tablets/Capsules Total Daily Dosage    Lamictal  100  1 1        200 mg    Depakote  500  1 2        1500 mg                                                                                                                 Carry this with you at all times.  CONTINUE TAKING YOUR OTHER MEDICATIONS AS PREVIOUSLY DIRECTED.      * * *Do not store medications in the bathroom.  Keep medications away from children!* * *             Follow-ups after your visit        Follow-up notes from your care team     Return in about 1 year (around 12/14/2018).      Who to contact     Please call your clinic at [phone] to:    Ask questions about your health    Make or cancel appointments    Discuss your medicines    Learn about your test results    Speak to your doctor   If you have compliments or concerns about an experience at your clinic, or if you wish to file a complaint, please contact [facility] Patient Relations at [phone] or email us at [email or web address]         Additional Information About Your Visit        MyChart Information     [facility] is an electronic gateway that provides easy, online access to your medical records. With [facility], you can request a clinic appointment, read your test results, renew a prescription or communicate with your care team.     To sign up for [facility] visit the website at [email or web address]   You will be asked to enter the access code listed below, as well as some personal information. Please follow the directions to create your username and  password.     Your access code is: [email or web address]  Expires: 3/14/2018 10:28 AM     Your access code will  in 90 days. If you need help or a new code, please contact your [facility] or call [phone] for assistance.        Care EveryWhere ID     This is your Care EveryWhere ID. This could be used by other organizations to access your [place] medical records  [id number]        Your Vitals Were     Pulse Height BMI (Body Mass Index)             58 6' (182.9 cm) 26.42 kg/m2          Blood Pressure from Last 3 Encounters:   17 120/61   06/15/17 143/71   17 144/76    Weight from Last 3 Encounters:   17 194 lb 12.8 oz (88.4 kg)   06/15/17 205 lb 3.2 oz (93.1 kg)   12/15/16 194 lb 3.2 oz (88.1 kg)              Today, you had the following     No orders found for display         Today's Medication Changes          These changes are accurate as of: 17 10:28 AM.  If you have any questions, ask your nurse or doctor.               Start taking these medicines.        Dose/Directions    * divalproex 500 MG 24 hr tablet   Commonly known as:  DEPAKOTE ER   Used for:  Seizure disorder (H)   Started by:  Austen Lemus MD        Take 500 mg in AM, 1000 mg in PM.   Quantity:  90 tablet   Refills:  11       * divalproex 500 MG 24 hr tablet   Commonly known as:  DEPAKOTE ER   Used for:  Seizure disorder (H)   Started by:  Austen Lemus MD        Take 500 mg in AM, 1000 mg in PM.   Quantity:  45 tablet   Refills:  0       * lamoTRIgine 100 MG tablet   Commonly known as:  LAMICTAL   Used for:  Seizure disorder (H)   Started by:  Austen Lemus MD        TAKE 1 TAB TWICE DAILY (beginning on 16).   Quantity:  60 tablet   Refills:  11       * lamoTRIgine 100 MG tablet   Commonly known as:  LAMICTAL   Used for:  Seizure disorder (H)   Started by:  Austen Lemus MD        TAKE 1 TAB TWICE DAILY (beginning on 16).   Quantity:  30 tablet   Refills:  0       * Notice:  This list has 4  medication(s) that are the same as other medications prescribed for you. Read the directions carefully, and ask your doctor or other care provider to review them with you.         Where to get your medicines      These medications were sent to [id number] - [place], [place] - [address]  [address] [facility] [address]    Hours:  Closed 10-16-08 business to [facility] Phone:  [phone]     divalproex 500 MG 24 hr tablet    divalproex 500 MG 24 hr tablet    lamoTRIgine 100 MG tablet    lamoTRIgine 100 MG tablet                Primary Care Provider Office Phone # Fax #    Shola Nelson [phone] [address] [facility] [address]        Equal Access to Services     SOLITARIO DRUMMOND : Winston vasquez Soken, waaxda luqadaha, qaybta kaalmada adelisayakayla, delvin anaya . So [facility] [phone].    ATENCIÓN: Si habla español, tiene a lagos disposición servicios gratuitos de asistencia lingüística. [facility] [phone].    We comply with applicable federal civil rights laws and [place] laws. We do not discriminate on the basis of race, color, national origin, age, disability, sex, sexual orientation, or gender identity.            Thank you!     Thank you for choosing [facility]  for your care. Our goal is always to provide you with excellent care. Hearing back from our patients is one way we can continue to improve our services. Please take a few minutes to complete the written survey that you may receive in the mail after your visit with us. Thank you!             Your Updated Medication List - Protect others around you: Learn how to safely use, store and throw away your medicines at www.disposemymeds.org.          This list is accurate as of: 12/14/17 10:28 AM.  Always use your most recent med list.                   Brand Name Dispense Instructions for use Diagnosis    cetirizine 10 MG tablet    zyrTEC    100 tablet     Take 1 tablet (10 mg) by mouth daily    Seasonal allergic rhinitis       * divalproex 500 MG 24 hr tablet    DEPAKOTE ER    90 tablet    Take 500 mg in AM, 1000 mg in PM.    Seizure disorder (H)       * divalproex 500 MG 24 hr tablet    DEPAKOTE ER    45 tablet    Take 500 mg in AM, 1000 mg in PM.    Seizure disorder (H)       guanFACINE 1 MG tablet    TENEX    60 tablet    Take 1 tablet (1 mg) by mouth 2 times daily    Active autistic disorder       * lamoTRIgine 100 MG tablet    LAMICTAL    60 tablet    TAKE 1 TAB TWICE DAILY (beginning on 5/2/16).    Seizure disorder (H)       * lamoTRIgine 100 MG tablet    LAMICTAL    30 tablet    TAKE 1 TAB TWICE DAILY (beginning on 5/2/16).    Seizure disorder (H)       montelukast 10 MG tablet    SINGULAIR    20 tablet    Take 1 tablet (10 mg) by mouth At Bedtime    Gastroesophageal reflux disease without esophagitis       MULTIVITAMINS PO           omeprazole 20 MG CR capsule    priLOSEC    20 capsule    Take 1 capsule (20 mg) by mouth daily    Gastroesophageal reflux disease without esophagitis       ondansetron 4 MG ODT tab    ZOFRAN-ODT    5 tablet    Take 1 tablet (4 mg) by mouth every 6 hours as needed for nausea        * ziprasidone 40 MG capsule    GEODON    60 capsule    Take 1 capsule (40 mg) by mouth 2 times daily (with meals) AM    Active autistic disorder       * ziprasidone 80 MG capsule    GEODON    60 capsule    Take 1 capsule (80 mg) by mouth 2 times daily (with meals) AM AND PM    Active autistic disorder       * Notice:  This list has 6 medication(s) that are the same as other medications prescribed for you. Read the directions carefully, and ask your doctor or other care provider to review them with you.

## 2017-12-14 NOTE — PATIENT INSTRUCTIONS
Times of Days  am pm        Medication Tablet Size Number of Tablets/Capsules Total Daily Dosage    Lamictal  100  1 1        200 mg    Depakote  500  1 2        1500 mg                                                                                                                 Carry this with you at all times.  CONTINUE TAKING YOUR OTHER MEDICATIONS AS PREVIOUSLY DIRECTED.      * * *Do not store medications in the bathroom.  Keep medications away from children!* * *

## 2018-01-31 ENCOUNTER — ALLIED HEALTH/NURSE VISIT (OUTPATIENT)
Dept: FAMILY MEDICINE | Facility: CLINIC | Age: 23
End: 2018-01-31
Payer: MEDICAID

## 2018-01-31 DIAGNOSIS — Z23 NEED FOR PROPHYLACTIC VACCINATION AND INOCULATION AGAINST INFLUENZA: Primary | ICD-10-CM

## 2018-01-31 PROCEDURE — 90471 IMMUNIZATION ADMIN: CPT

## 2018-01-31 PROCEDURE — 90686 IIV4 VACC NO PRSV 0.5 ML IM: CPT

## 2018-01-31 PROCEDURE — 99207 ZZC NO CHARGE NURSE ONLY: CPT

## 2018-01-31 NOTE — PROGRESS NOTES

## 2018-01-31 NOTE — MR AVS SNAPSHOT
"              After Visit Summary   1/31/2018    Shola Owens    MRN: [id number]           Patient Information     Date Of Birth          1995        Visit Information        Provider Department      1/31/2018 9:00 AM [facility]        Today's Diagnoses     Need for prophylactic vaccination and inoculation against influenza    -  1       Follow-ups after your visit        Your next 10 appointments already scheduled     Jan 31, 2018  9:00 AM CST   Nurse Only with [facility]   [facility]    [address] [address] [phone]              Who to contact     If you have questions or need follow up information about today's clinic visit or your schedule please contact [facility] directly at [phone].  Normal or non-critical lab and imaging results will be communicated to you by MyChart, letter or phone within 4 business days after the clinic has received the results. If you do not hear from us within 7 days, please contact the clinic through MyChart or phone. If you have a critical or abnormal lab result, we will notify you by phone as soon as possible.  Submit refill requests through Go!F[employer] or call your pharmacy and they will forward the refill request to us. Please allow 3 business days for your refill to be completed.          Additional Information About Your Visit        MyChart Information     Go!Foton lets you send messages to your doctor, view your test results, renew your prescriptions, schedule appointments and more. To sign up, go to [email or web address] . Click on \"Log in\" on the left side of the screen, which will take you to the Welcome page. Then click on \"Sign up Now\" on the right side of the page.     You will be asked to enter the access code listed below, as well as some personal information. Please follow the directions to create your username and password.   "   Your access code is: [email or web address]  Expires: 3/14/2018 10:28 AM     Your access code will  in 90 days. If you need help or a new code, please call your [place] clinic or [phone].        Care EveryWhere ID     This is your Care EveryWhere ID. This could be used by other organizations to access your [place] medical records  [id number]         Blood Pressure from Last 3 Encounters:   17 120/61   06/15/17 143/71   17 144/76    Weight from Last 3 Encounters:   17 194 lb 12.8 oz (88.4 kg)   06/15/17 205 lb 3.2 oz (93.1 kg)   12/15/16 194 lb 3.2 oz (88.1 kg)              We Performed the Following     FLU VAC, SPLIT VIRUS IM > 3 YO (QUADRIVALENT) [28176]     Vaccine Administration, Initial [52262]        Primary Care Provider Office Phone # Fax #    Shola Nelson [phone] [address] [address]        Equal Access to Services     SOLITARIO DRUMMOND : Hadii vida ku hadasho Soken, waaxda luqadaha, qaybta kaalmada davis, delvin anaya . So [facility] [phone].    ATENCIÓN: Si habla español, tiene a lagos disposición servicios gratuitos de asistencia lingüística. Llame al [phone].    We comply with applicable federal civil rights laws and [place] laws. We do not discriminate on the basis of race, color, national origin, age, disability, sex, sexual orientation, or gender identity.            Thank you!     Thank you for choosing [facility]  for your care. Our goal is always to provide you with excellent care. Hearing back from our patients is one way we can continue to improve our services. Please take a few minutes to complete the written survey that you may receive in the mail after your visit with us. Thank you!             Your Updated Medication List - Protect others around you: Learn how to safely use, store and throw away your medicines at www.disposemymeds.org.          This list is accurate as of  1/31/18  8:46 AM.  Always use your most recent med list.                   Brand Name Dispense Instructions for use Diagnosis    cetirizine 10 MG tablet    zyrTEC    100 tablet    Take 1 tablet (10 mg) by mouth daily    Seasonal allergic rhinitis       * divalproex sodium extended-release 500 MG 24 hr tablet    DEPAKOTE ER    90 tablet    Take 500 mg in AM, 1000 mg in PM.    Seizure disorder (H)       * divalproex sodium extended-release 500 MG 24 hr tablet    DEPAKOTE ER    45 tablet    Take 500 mg in AM, 1000 mg in PM.    Seizure disorder (H)       guanFACINE 1 MG tablet    TENEX    60 tablet    Take 1 tablet (1 mg) by mouth 2 times daily    Active autistic disorder       * lamoTRIgine 100 MG tablet    LAMICTAL    60 tablet    TAKE 1 TAB TWICE DAILY (beginning on 5/2/16).    Seizure disorder (H)       * lamoTRIgine 100 MG tablet    LAMICTAL    30 tablet    TAKE 1 TAB TWICE DAILY (beginning on 5/2/16).    Seizure disorder (H)       montelukast 10 MG tablet    SINGULAIR    20 tablet    Take 1 tablet (10 mg) by mouth At Bedtime    Gastroesophageal reflux disease without esophagitis       MULTIVITAMINS PO           omeprazole 20 MG CR capsule    priLOSEC    20 capsule    Take 1 capsule (20 mg) by mouth daily    Gastroesophageal reflux disease without esophagitis       ondansetron 4 MG ODT tab    ZOFRAN-ODT    5 tablet    Take 1 tablet (4 mg) by mouth every 6 hours as needed for nausea        * ziprasidone 40 MG capsule    GEODON    60 capsule    Take 1 capsule (40 mg) by mouth 2 times daily (with meals) AM    Active autistic disorder       * ziprasidone 80 MG capsule    GEODON    60 capsule    Take 1 capsule (80 mg) by mouth 2 times daily (with meals) AM AND PM    Active autistic disorder       * Notice:  This list has 6 medication(s) that are the same as other medications prescribed for you. Read the directions carefully, and ask your doctor or other care provider to review them with you.

## 2018-03-13 ENCOUNTER — OFFICE VISIT (OUTPATIENT)
Dept: FAMILY MEDICINE | Facility: CLINIC | Age: 23
End: 2018-03-13
Payer: MEDICAID

## 2018-03-13 VITALS
OXYGEN SATURATION: 100 % | WEIGHT: 187 LBS | HEIGHT: 72 IN | HEART RATE: 67 BPM | TEMPERATURE: 96.9 F | BODY MASS INDEX: 25.33 KG/M2 | SYSTOLIC BLOOD PRESSURE: 123 MMHG | DIASTOLIC BLOOD PRESSURE: 63 MMHG

## 2018-03-13 DIAGNOSIS — L72.3 SEBACEOUS CYST: ICD-10-CM

## 2018-03-13 DIAGNOSIS — Z00.00 ROUTINE GENERAL MEDICAL EXAMINATION AT A HEALTH CARE FACILITY: Primary | ICD-10-CM

## 2018-03-13 DIAGNOSIS — K21.9 GASTROESOPHAGEAL REFLUX DISEASE WITHOUT ESOPHAGITIS: ICD-10-CM

## 2018-03-13 PROCEDURE — 99395 PREV VISIT EST AGE 18-39: CPT | Performed by: NURSE PRACTITIONER

## 2018-03-13 PROCEDURE — 99213 OFFICE O/P EST LOW 20 MIN: CPT | Mod: 25 | Performed by: NURSE PRACTITIONER

## 2018-03-13 RX ORDER — ESOMEPRAZOLE MAGNESIUM 40 MG/1
40 CAPSULE, DELAYED RELEASE ORAL
Qty: 30 CAPSULE | Refills: 2 | Status: SHIPPED | OUTPATIENT
Start: 2018-03-13 | End: 2018-06-12

## 2018-03-13 NOTE — PROGRESS NOTES
SUBJECTIVE:   CC: Shola Owens is an 22 year old male who presents for preventative health visit.     Healthy Habits:    Do you get at least three servings of calcium containing foods daily (dairy, green leafy vegetables, etc.)? 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 yes    Do you see a dentist twice per year? yes    Do you have sleep apnea, excessive snoring or daytime drowsiness?no       C/O noticng blood on toilet seat after BM's off/on, becoming more frequent. Notices a sore above tailbone.    HX of GERD, has been off of Omeprazole now has a dry cough. Omeprazole did not work well.    Today's PHQ-2 Score:   PHQ-2 ( 1999 [employer]) 6/15/2017 12/15/2016   Q1: Little interest or pleasure in doing things 0 0   Q2: Feeling down, depressed or hopeless 0 0   PHQ-2 Score 0 0       Abuse: Current or Past(Physical, Sexual or Emotional)- No  Do you feel safe in your environment - Yes    Social History   Substance Use Topics     Smoking status: Never Smoker     Smokeless tobacco: Never Used      Comment: outside smokers-not in the house     Alcohol use No      If you drink alcohol do you typically have >3 drinks per day or >7 drinks per week? No                      Last PSA: No results found for: PSA    Reviewed orders with patient. Reviewed health maintenance and updated orders accordingly - Yes  Patient Active Problem List   Diagnosis     Active autistic disorder     Disturbance in sleep behavior     Seizure     Zygomatic fracture, unspecified, initial encounter for closed fracture     Closed fracture of maxilla (H)     No past surgical history on file.    Social History   Substance Use Topics     Smoking status: Never Smoker     Smokeless tobacco: Never Used      Comment: outside smokers-not in the house     Alcohol  use No     Family History   Problem Relation Age of Onset     Hypertension Maternal Uncle      Hypertension Father      Hypertension Paternal Grandmother      Thyroid Disease Paternal Grandmother      hypo     Allergies Mother      seasonal     HEART DISEASE Maternal Grandmother      A. Fib     Thyroid Disease Maternal Grandmother      hypo     CANCER Maternal Grandfather      cancerous tumor in his arm     CANCER Paternal Grandfather      passed from lung cancer           Reviewed and updated as needed this visit by clinical staff  Meds         Reviewed and updated as needed this visit by Provider            ROS:  C: NEGATIVE for fever, chills, change in weight  I: NEGATIVE for worrisome rashes, moles or lesions  E: NEGATIVE for vision changes or irritation  ENT: NEGATIVE for ear, mouth and throat problems  R: NEGATIVE for significant cough or SOB  CV: NEGATIVE for chest pain, palpitations or peripheral edema  GI: POSITIVE for heartburn or reflux   male: negative for dysuria, hematuria, decreased urinary stream, erectile dysfunction, urethral discharge  M: NEGATIVE for significant arthralgias or myalgia  N: NEGATIVE for weakness, dizziness or paresthesias  P: NEGATIVE for changes in mood or affect    OBJECTIVE:   /63 (BP Location: Left arm, Patient Position: Chair, Cuff Size: Adult Regular)  Pulse 67  Temp 96.9  F (36.1  C) (Tympanic)  Ht 6' (1.829 m)  Wt 187 lb (84.8 kg)  SpO2 100%  BMI 25.36 kg/m2  EXAM:  GENERAL: healthy, alert and no distress  EYES: Eyes grossly normal to inspection, PERRL and conjunctivae and sclerae normal  HENT: ear canals and TM's normal, nose and mouth without ulcers or lesions  NECK: no adenopathy, no asymmetry, masses, or scars and thyroid normal to palpation  RESP: lungs clear to auscultation - no rales, rhonchi or wheezes  CV: regular rate and rhythm, normal S1 S2, no S3 or S4, no murmur, click or rub, no peripheral edema and peripheral pulses strong  ABDOMEN: soft,  nontender, no hepatosplenomegaly, no masses and bowel sounds normal  RECTAL: deferred  MS: no gross musculoskeletal defects noted, no edema  SKIN: no suspicious lesions or rashes  seb cyst noted on right gluteal   NEURO: Normal strength and tone, mentation intact and speech normal  PSYCH: mentation appears normal, affect normal/bright    ASSESSMENT/PLAN:   1. Routine general medical examination at a health care facility    2. Gastroesophageal reflux disease without esophagitis    - esomeprazole (NEXIUM) 40 MG CR capsule; Take 1 capsule (40 mg) by mouth every morning (before breakfast) Take 30-60 minutes before a eating.  Dispense: 30 capsule; Refill: 2  - follow up in 2-3 months  - consider endoscopy   - consider referral to allergy/GI    3. Sebaceous cyst  Appears like it drained- apply warm soak prn      COUNSELING:  Reviewed preventive health counseling, as reflected in patient instructions       Regular exercise       Healthy diet/nutrition     reports that he has never smoked. He has never used smokeless tobacco.    Estimated body mass index is 25.36 kg/(m^2) as calculated from the following:    Height as of this encounter: 6' (1.829 m).    Weight as of this encounter: 187 lb (84.8 kg).       Counseling Resources:  ATP IV Guidelines  Pooled Cohorts Equation Calculator  FRAX Risk Assessment  ICSI Preventive Guidelines  Dietary Guidelines for Americans, 2010  USDA's MyPlate  ASA Prophylaxis  Lung CA Screening    DEYSI Sweet CNP  [facility]

## 2018-03-13 NOTE — MR AVS SNAPSHOT
After Visit Summary   3/13/2018    Shola Owens    MRN: [id number]           Patient Information     Date Of Birth          1995        Visit Information        Provider Department      3/13/2018 4:00 PM Lexi Garcia APRN CNP [facility]        Today's Diagnoses     Routine general medical examination at a health care facility    -  1    Gastroesophageal reflux disease without esophagitis          Care Instructions      Preventive Health Recommendations  Male Ages 18 - 25     Yearly exam:             See your health care provider every year in order to  o   Review health changes.   o   Discuss preventive care.    o   Review your medicines if your doctor has prescribed any.    You should be tested each year for STDs (sexually transmitted diseases).     Talk to your provider about cholesterol testing.      If you are at risk for diabetes, you should have a diabetes test (fasting glucose).    Shots: Get a flu shot each year. Get a tetanus shot every 10 years.     Nutrition:    Eat at least 5 servings of fruits and vegetables daily.     Eat whole-grain bread, whole-wheat pasta and brown rice instead of white grains and rice.     Talk to your provider about calcium and Vitamin D.     Lifestyle    Exercise for at least 150 minutes a week (30 minutes a day, 5 days a week). This will help you control your weight and prevent disease.     Limit alcohol to one drink per day.     No smoking.     Wear sunscreen to prevent skin cancer.     See your dentist every six months for an exam and cleaning.             Follow-ups after your visit        Who to contact     If you have questions or need follow up information about today's clinic visit or your schedule please contact [facility] directly at [phone].  Normal or non-critical lab and imaging results will be communicated to you by MyChart, letter or phone within 4 business days after the clinic has received the results. If  "you do not hear from us within 7 days, please contact the clinic through Mindscape or phone. If you have a critical or abnormal lab result, we will notify you by phone as soon as possible.  Submit refill requests through Mindscape or call your pharmacy and they will forward the refill request to us. Please allow 3 business days for your refill to be completed.          Additional Information About Your Visit        [employer] Information     Mindscape lets you send messages to your doctor, view your test results, renew your prescriptions, schedule appointments and more. To sign up, go to [email or web address] . Click on \"Log in\" on the left side of the screen, which will take you to the Welcome page. Then click on \"Sign up Now\" on the right side of the page.     You will be asked to enter the access code listed below, as well as some personal information. Please follow the directions to create your username and password.     Your access code is: [email or web address]  Expires: 3/14/2018 11:28 AM     Your access code will  in 90 days. If you need help or a new code, please call your [place] clinic or [phone].        Care EveryWhere ID     This is your Care EveryWhere ID. This could be used by other organizations to access your [place] medical records  [id number]        Your Vitals Were     Pulse Temperature Height Pulse Oximetry BMI (Body Mass Index)       67 96.9  F (36.1  C) (Tympanic) 6' (1.829 m) 100% 25.36 kg/m2        Blood Pressure from Last 3 Encounters:   18 123/63   17 120/61   06/15/17 143/71    Weight from Last 3 Encounters:   18 187 lb (84.8 kg)   17 194 lb 12.8 oz (88.4 kg)   06/15/17 205 lb 3.2 oz (93.1 kg)              Today, you had the following     No orders found for display         Today's Medication Changes          These changes are accurate as of 3/13/18  4:09 PM.  If you have any questions, ask your nurse or doctor.               Start taking these medicines.        " Dose/Directions    esomeprazole 40 MG CR capsule   Commonly known as:  nexIUM   Used for:  Routine general medical examination at a health care facility, Gastroesophageal reflux disease without esophagitis        Dose:  40 mg   Take 1 capsule (40 mg) by mouth every morning (before breakfast) Take 30-60 minutes before a eating.   Quantity:  30 capsule   Refills:  2            Where to get your medicines      These medications were sent to [id number] - [place], [place] - [address]  [address] [address]    Hours:  Closed 10-16-08 business to [facility] Phone:  [phone]     esomeprazole 40 MG CR capsule                Primary Care Provider Office Phone # Fax #    Shola Nelson [phone] [address] [address]        Equal Access to Services     SOLITARIO DRUMMOND : Winston vasquez Soken, waaxda luqhenrry, qaybnancy kaalmakayla cannon, delvin anaya . So [facility] [phone].    ATENCIÓN: Si habla español, tiene a lagos disposición servicios gratuitos de asistencia lingüística. Genoveva al [phone].    We comply with applicable federal civil rights laws and [place] laws. We do not discriminate on the basis of race, color, national origin, age, disability, sex, sexual orientation, or gender identity.            Thank you!     Thank you for choosing [facility]  for your care. Our goal is always to provide you with excellent care. Hearing back from our patients is one way we can continue to improve our services. Please take a few minutes to complete the written survey that you may receive in the mail after your visit with us. Thank you!             Your Updated Medication List - Protect others around you: Learn how to safely use, store and throw away your medicines at www.disposemymeds.org.          This list is accurate as of 3/13/18  4:09 PM.  Always use your most recent med list.                    Brand Name Dispense Instructions for use Diagnosis    cetirizine 10 MG tablet    zyrTEC    100 tablet    Take 1 tablet (10 mg) by mouth daily    Seasonal allergic rhinitis       * divalproex sodium extended-release 500 MG 24 hr tablet    DEPAKOTE ER    90 tablet    Take 500 mg in AM, 1000 mg in PM.    Seizure disorder (H)       * divalproex sodium extended-release 500 MG 24 hr tablet    DEPAKOTE ER    45 tablet    Take 500 mg in AM, 1000 mg in PM.    Seizure disorder (H)       esomeprazole 40 MG CR capsule    nexIUM    30 capsule    Take 1 capsule (40 mg) by mouth every morning (before breakfast) Take 30-60 minutes before a eating.    Routine general medical examination at a health care facility, Gastroesophageal reflux disease without esophagitis       guanFACINE 1 MG tablet    TENEX    60 tablet    Take 1 tablet (1 mg) by mouth 2 times daily    Active autistic disorder       * lamoTRIgine 100 MG tablet    LAMICTAL    60 tablet    TAKE 1 TAB TWICE DAILY (beginning on 5/2/16).    Seizure disorder (H)       * lamoTRIgine 100 MG tablet    LAMICTAL    30 tablet    TAKE 1 TAB TWICE DAILY (beginning on 5/2/16).    Seizure disorder (H)       montelukast 10 MG tablet    SINGULAIR    20 tablet    Take 1 tablet (10 mg) by mouth At Bedtime    Gastroesophageal reflux disease without esophagitis       MULTIVITAMINS PO           * ziprasidone 40 MG capsule    GEODON    60 capsule    Take 1 capsule (40 mg) by mouth 2 times daily (with meals) AM    Active autistic disorder       * ziprasidone 80 MG capsule    GEODON    60 capsule    Take 1 capsule (80 mg) by mouth 2 times daily (with meals) AM AND PM    Active autistic disorder       * Notice:  This list has 6 medication(s) that are the same as other medications prescribed for you. Read the directions carefully, and ask your doctor or other care provider to review them with you.

## 2018-03-13 NOTE — NURSING NOTE
Initial /63 (BP Location: Left arm, Patient Position: Chair, Cuff Size: Adult Regular)  Pulse 67  Temp 96.9  F (36.1  C) (Tympanic)  Ht 6' (1.829 m)  Wt 187 lb (84.8 kg)  SpO2 100%  BMI 25.36 kg/m2 Estimated body mass index is 25.36 kg/(m^2) as calculated from the following:    Height as of this encounter: 6' (1.829 m).    Weight as of this encounter: 187 lb (84.8 kg). .    Maribell Forman

## 2018-06-12 DIAGNOSIS — K21.9 GASTROESOPHAGEAL REFLUX DISEASE WITHOUT ESOPHAGITIS: ICD-10-CM

## 2018-06-12 DIAGNOSIS — Z00.00 ROUTINE GENERAL MEDICAL EXAMINATION AT A HEALTH CARE FACILITY: ICD-10-CM

## 2018-06-12 RX ORDER — ESOMEPRAZOLE MAGNESIUM 40 MG/1
40 CAPSULE, DELAYED RELEASE ORAL
Qty: 30 CAPSULE | Refills: 0 | Status: SHIPPED | OUTPATIENT
Start: 2018-06-12 | End: 2020-02-25

## 2018-06-12 NOTE — TELEPHONE ENCOUNTER
Medication is being filled for 1 time refill only due to:  Patient needs to be seen because was to follow up in 2-3 months:.     3/13/18 Office Visit Epic note:      2. Gastroesophageal reflux disease without esophagitis     - esomeprazole (NEXIUM) 40 MG CR capsule; Take 1 capsule (40 mg) by mouth every morning (before breakfast) Take 30-60 minutes before a eating.  Dispense: 30 capsule; Refill: 2  - follow up in 2-3 months  - consider endoscopy   - consider referral to allergy/GI    Elvi LORENZO RN

## 2018-06-12 NOTE — TELEPHONE ENCOUNTER
"Requested Prescriptions   Pending Prescriptions Disp Refills     esomeprazole (NEXIUM) 40 MG CR capsule  Last Written Prescription Date:  3/13/2018  Last Fill Quantity: 30,  # refills: 2   Last office visit: 3/13/2018 with prescribing provider:  Leslie   Future Office Visit:     30 capsule 2     Sig: Take 1 capsule (40 mg) by mouth every morning (before breakfast) Take 30-60 minutes before a eating.    PPI Protocol Passed    6/12/2018  3:04 PM       Passed - Not on Clopidogrel (unless Pantoprazole ordered)       Passed - No diagnosis of osteoporosis on record       Passed - Recent (12 mo) or future (30 days) visit within the authorizing provider's specialty    Patient had office visit in the last 12 months or has a visit in the next 30 days with authorizing provider or within the authorizing provider's specialty.  See \"Patient Info\" tab in inbasket, or \"Choose Columns\" in Meds & Orders section of the refill encounter.           Passed - Patient is age 18 or older          "

## 2018-07-14 ENCOUNTER — HOSPITAL ENCOUNTER (EMERGENCY)
Facility: CLINIC | Age: 23
Discharge: HOME | End: 2018-07-14
Attending: EMERGENCY MEDICINE | Admitting: EMERGENCY MEDICINE
Payer: MEDICAID

## 2018-07-14 ENCOUNTER — APPOINTMENT (OUTPATIENT)
Dept: GENERAL RADIOLOGY | Facility: CLINIC | Age: 23
End: 2018-07-14
Attending: EMERGENCY MEDICINE
Payer: MEDICAID

## 2018-07-14 VITALS
HEIGHT: 73 IN | WEIGHT: 185 LBS | DIASTOLIC BLOOD PRESSURE: 80 MMHG | SYSTOLIC BLOOD PRESSURE: 153 MMHG | OXYGEN SATURATION: 95 % | RESPIRATION RATE: 16 BRPM | BODY MASS INDEX: 24.52 KG/M2 | TEMPERATURE: 98.9 F

## 2018-07-14 DIAGNOSIS — R50.9 ACUTE FEBRILE ILLNESS: ICD-10-CM

## 2018-07-14 LAB
DEPRECATED S PYO AG THROAT QL EIA: NORMAL
SPECIMEN SOURCE: NORMAL

## 2018-07-14 PROCEDURE — 87081 CULTURE SCREEN ONLY: CPT | Performed by: EMERGENCY MEDICINE

## 2018-07-14 PROCEDURE — 99284 EMERGENCY DEPT VISIT MOD MDM: CPT | Mod: Z6 | Performed by: EMERGENCY MEDICINE

## 2018-07-14 PROCEDURE — 99284 EMERGENCY DEPT VISIT MOD MDM: CPT | Mod: 25

## 2018-07-14 PROCEDURE — 87880 STREP A ASSAY W/OPTIC: CPT | Performed by: EMERGENCY MEDICINE

## 2018-07-14 PROCEDURE — 71046 X-RAY EXAM CHEST 2 VIEWS: CPT

## 2018-07-14 RX ORDER — AMOXICILLIN 500 MG/1
500 CAPSULE ORAL 3 TIMES DAILY
Qty: 21 CAPSULE | Refills: 0 | Status: SHIPPED | OUTPATIENT
Start: 2018-07-14 | End: 2018-07-21

## 2018-07-14 ASSESSMENT — ENCOUNTER SYMPTOMS
FEVER: 1
SORE THROAT: 1
COUGH: 1

## 2018-07-14 NOTE — ED AVS SNAPSHOT
[facility]    [address] [address]    Phone:  [phone]    Fax:  [phone]                                       Shola Owens   MRN: [id number]    Department:  [facility]   Date of Visit:  7/14/2018           After Visit Summary Signature Page     I have received my discharge instructions, and my questions have been answered. I have discussed any challenges I see with this plan with the nurse or doctor.    ..........................................................................................................................................  Patient/Patient Representative Signature      ..........................................................................................................................................  Patient Representative Print Name and Relationship to Patient    ..................................................               ................................................  Date                                            Time    ..........................................................................................................................................  Reviewed by Signature/Title    ...................................................              ..............................................  Date                                                            Time

## 2018-07-14 NOTE — ED AVS SNAPSHOT
[facility]    [address] [address]    Phone:  [phone]    Fax:  [phone]                                       Shola Owens   MRN: [id number]    Department:  [facility]   Date of Visit:  7/14/2018           Patient Information     Date Of Birth          1995        Your diagnoses for this visit were:     Acute febrile illness- exposed to + strept pharnygitis.        You were seen by Clarke Chaudhary DO.        Discharge Instructions       Amoxicillin 500 mg 3 times daily ×7 days  Alternate Tylenol with ibuprofen 4 hours as needed for fever  Recheck in the clinic in 3 days of not better.    Your next 10 appointments already scheduled     Nov 15, 2018 10:00 AM CST   Return Visit with Austen Lemus MD   [facility] ([facility])    [address] [place] [address], [address]  [facility] [address] [phone]              24 Hour Appointment Hotline       To make an appointment at any [facility], call [phone] ([phone]). If you don't have a family doctor or clinic, we will help you find one. [facility] clinics are conveniently located to serve the needs of you and your family.             Review of your medicines      START taking        Dose / Directions Last dose taken    amoxicillin 500 MG capsule   Commonly known as:  AMOXIL   Dose:  500 mg   Quantity:  21 capsule        Take 1 capsule (500 mg) by mouth 3 times daily for 7 days   Refills:  0          Our records show that you are taking the medicines listed below. If these are incorrect, please call your family doctor or clinic.        Dose / Directions Last dose taken    cetirizine 10 MG tablet   Commonly known as:  zyrTEC   Dose:  10 mg   Quantity:  100 tablet        Take 1 tablet (10 mg) by mouth daily   Refills:  3        * divalproex sodium extended-release 500 MG 24 hr tablet   Commonly known as:  DEPAKOTE ER   Quantity:  90 tablet         Take 500 mg in AM, 1000 mg in PM.   Refills:  11        * divalproex sodium extended-release 500 MG 24 hr tablet   Commonly known as:  DEPAKOTE ER   Quantity:  45 tablet        Take 500 mg in AM, 1000 mg in PM.   Refills:  0        esomeprazole 40 MG CR capsule   Commonly known as:  nexIUM   Dose:  40 mg   Quantity:  30 capsule        Take 1 capsule (40 mg) by mouth every morning (before breakfast) Take 30-60 minutes before a eating. NEEDS APPT   Refills:  0        guanFACINE 1 MG tablet   Commonly known as:  TENEX   Dose:  1 mg   Quantity:  60 tablet        Take 1 tablet (1 mg) by mouth 2 times daily   Refills:  0        * lamoTRIgine 100 MG tablet   Commonly known as:  LAMICTAL   Quantity:  60 tablet        TAKE 1 TAB TWICE DAILY (beginning on 5/2/16).   Refills:  11        * lamoTRIgine 100 MG tablet   Commonly known as:  LAMICTAL   Quantity:  30 tablet        TAKE 1 TAB TWICE DAILY (beginning on 5/2/16).   Refills:  0        montelukast 10 MG tablet   Commonly known as:  SINGULAIR   Dose:  10 mg   Quantity:  20 tablet        Take 1 tablet (10 mg) by mouth At Bedtime   Refills:  0        MULTIVITAMINS PO        Refills:  0        * ziprasidone 40 MG capsule   Commonly known as:  GEODON   Dose:  40 mg   Quantity:  60 capsule        Take 1 capsule (40 mg) by mouth 2 times daily (with meals) AM   Refills:  0        * ziprasidone 80 MG capsule   Commonly known as:  GEODON   Dose:  80 mg   Quantity:  60 capsule        Take 1 capsule (80 mg) by mouth 2 times daily (with meals) AM AND PM   Refills:  0        * Notice:  This list has 6 medication(s) that are the same as other medications prescribed for you. Read the directions carefully, and ask your doctor or other care provider to review them with you.            Prescriptions were sent or printed at these locations (1 Prescription)                   [id number] - [place], [place] - [address]   [address] [address]    Telephone:   [phone]   Fax:  [phone]   Hours:  Closed 10-16-08 business to [facility]                E-Prescribed (1 of 1)         amoxicillin (AMOXIL) 500 MG capsule                Procedures and tests performed during your visit     Beta strep group A culture    Rapid strep screen    XR Chest 2 Views      Orders Needing Specimen Collection     None      Pending Results     Date and Time Order Name Status Description    7/14/2018 0745 Beta strep group A culture In process             Pending Culture Results     Date and Time Order Name Status Description    7/14/2018 0745 Beta strep group A culture In process             Pending Results Instructions     If you had any lab results that were not finalized at the time of your Discharge, you can call the ED Lab Result RN at [phone]. You will be contacted by this team for any positive Lab results or changes in treatment. The nurses are available 7 days a week from 10A to 6:30P.  You can leave a message 24 hours per day and they will return your call.        Test Results From Your Hospital Stay        7/14/2018  8:04 AM      Component Results     Component    Specimen Description    Throat    Rapid Strep A Screen    NEGATIVE: No Group A streptococcal antigen detected by immunoassay, await culture report.         7/14/2018  8:16 AM         7/14/2018  9:17 AM      Narrative     XR CHEST 2 VW 7/14/2018 9:09 AM     HISTORY: cough fever;     COMPARISON: None        Impression     IMPRESSION: The cardiac silhouette and pulmonary vasculature are  within normal limits. The lungs are clear.    ODETTE IRAHETA MD                Thank you for choosing [facility]       Thank you for choosing [facility] for your care. Our goal is always to provide you with excellent care. Hearing back from our patients is one way we can continue to improve our services. Please take a few minutes to complete the written survey that you may receive in the mail after you visit with us. Thank  "you!        [employer] Information     [email or web address] lets you send messages to your doctor, view your test results, renew your prescriptions, schedule appointments and more. To sign up, go to [email or web address] . Click on \"Log in\" on the left side of the screen, which will take you to the Welcome page. Then click on \"Sign up Now\" on the right side of the page.     You will be asked to enter the access code listed below, as well as some personal information. Please follow the directions to create your username and password.     Your access code is: [email or web address]  Expires: 10/12/2018 10:05 AM     Your access code will  in 90 days. If you need help or a new code, please call your [place] clinic or [phone].        Care EveryWhere ID     This is your Care EveryWhere ID. This could be used by other organizations to access your [place] medical records  [id number]        Equal Access to Services     [facility] : Hadii vida kohlero Soken, waaxda luqadaha, qaybta kaalmada adelisayakayla, delvin anaya . So [facility] [phone].    ATENCIÓN: Si habla español, tiene a lagos disposición servicios gratuitos de asistencia lingüística. Llame al [phone].    We comply with applicable federal civil rights laws and [place] laws. We do not discriminate on the basis of race, color, national origin, age, disability, sex, sexual orientation, or gender identity.            After Visit Summary       This is your record. Keep this with you and show to your community pharmacist(s) and doctor(s) at your next visit.                  "

## 2018-07-14 NOTE — ED PROVIDER NOTES
"  History     Chief Complaint   Patient presents with     Pharyngitis     st and fever started yesterday, sibling has strep     HPI  Shola Owens is a 23 year old male who has a history of autism, disturbance in sleep behavior, and seizure, presents to the the ED for evaluation of a sore throat. His mother aids in the history, because the patient was nonverbal at the time of the examination. The mother states that the patient's sister, Nisha, was diagnosed with strep on Tuesday of this past week and is currently still on antibiotics for it. She states that the patient has a \"barky\", non productive, cough that began on Thursday night. She states that it is the worst in the mornings and at night. The patient then began to feel that his throat was sore yesterday and had a temperature of 101.2 degrees Fahrenheit. The mother then gave the patient Advil and he responded well to it, with his temperature decreasing to 98.9 degrees Fahrenheit.    Problem List:    Patient Active Problem List    Diagnosis Date Noted     Zygomatic fracture, unspecified, initial encounter for closed fracture 04/18/2016     Priority: Medium     Closed fracture of maxilla (H) 04/18/2016     Priority: Medium     Seizure 04/30/2015     Priority: Medium     Disturbance in sleep behavior 08/31/2006     Priority: Medium     Doing well on Geodon managed by Dr. Sanders.  05/2013:  Geodon 120 mg BID.  Problem list name updated by automated process. Provider to review       Active autistic disorder 06/13/2005     Priority: Medium     Moderate to severe.  On Tenex and Cymbalta for anxiety.  Meds managed by Dr. Sanders.  [facility] [place].  05/2013: Tenex 1 mg BID.  Started Zyprexa PRN for some increased aggression.  Reflux symptoms have been episodic since 11/2011. Associated with the size of the meals. Portion control is effective.   Problem list name updated by automated process. Provider to review          Past Medical History:  " "  Past Medical History:   Diagnosis Date     Acne      Autistic disorder, current or active state      Mild persistent asthma        Past Surgical History:    No past surgical history on file.    Family History:    Family History   Problem Relation Age of Onset     Hypertension Maternal Uncle      Hypertension Father      Hypertension Paternal Grandmother      Thyroid Disease Paternal Grandmother      hypo     Allergies Mother      seasonal     HEART DISEASE Maternal Grandmother      A. Fib     Thyroid Disease Maternal Grandmother      hypo     Cancer Maternal Grandfather      cancerous tumor in his arm     Cancer Paternal Grandfather      passed from lung cancer       Social History:  Marital Status:  Single [1]  Social History   Substance Use Topics     Smoking status: Never Smoker     Smokeless tobacco: Never Used      Comment: outside smokers-not in the house     Alcohol use No        Medications:      cetirizine (ZYRTEC) 10 MG tablet   divalproex (DEPAKOTE ER) 500 MG 24 hr tablet   divalproex (DEPAKOTE ER) 500 MG 24 hr tablet   esomeprazole (NEXIUM) 40 MG CR capsule   guanFACINE (TENEX) 1 MG tablet   lamoTRIgine (LAMICTAL) 100 MG tablet   lamoTRIgine (LAMICTAL) 100 MG tablet   montelukast (SINGULAIR) 10 MG tablet   Multiple Vitamin (MULTIVITAMINS PO)   ziprasidone (GEODON) 40 MG capsule   ziprasidone (GEODON) 80 MG capsule         Review of Systems   Constitutional: Positive for fever.   HENT: Positive for sore throat.    Respiratory: Positive for cough.    All other systems reviewed and are negative.      Physical Exam   BP: 153/80  Heart Rate: 107  Temp: 98.9  F (37.2  C)  Resp: 16  Height: 185.4 cm (6' 1\")  Weight: 83.9 kg (185 lb)  SpO2: 95 %      Physical Exam  Nonverbal  Vital signs reviewed  Head:  Normocephalic.    Eyes:  PERRLA, full EOM.  External exams normal.    Ears:  Normal pinnae, canals, and TM's.    Nose:  Patent, without deformity.  No rhinorrhea  Throat:  Moist mucous membranes without " lesions, erythema, or exudate.  No soft tissue swelling  Neck:  Supple, without masses, lymphadenopathy or tenderness.    Respiratory:  Normal respiratory effort.  Lungs are clear with good breath sounds.    Heart:  RR without murmurs, rubs, or gallops.  Skin: No rash, warm, dry  Abdomen: Nontender with no hepatosplenomegaly  ED Course     ED Course     Procedures                 Results for orders placed or performed during the hospital encounter of 07/14/18   XR Chest 2 Views    Narrative    XR CHEST 2 VW 7/14/2018 9:09 AM     HISTORY: cough fever;     COMPARISON: None      Impression    IMPRESSION: The cardiac silhouette and pulmonary vasculature are  within normal limits. The lungs are clear.    ODETTE IRAHETA MD   Rapid strep screen   Result Value Ref Range    Specimen Description Throat     Rapid Strep A Screen       NEGATIVE: No Group A streptococcal antigen detected by immunoassay, await culture report.         Medications - No data to display  8:14 AM Patient assessed.  Assessments & Plan (with Medical Decision Making)  8:50 AM  Autistic 23-year-old male  Acute febrile illness.  Exposed to group A strep pharyngitis per sister was diagnosed 2 days ago.  Examination of throat/posterior pharynx looked normal with no exudate or erythema.  Rapid strep screen negative.  Recent loose cough.  I do not hear any consolidation on chest auscultation.  Chest x-ray pending.  9:56 AM  RST negative  CXR negative  Exposed to group A strept pharyngitis. Mother requesting antibiotic- tx qwtih AMOX. Pending TC.     I have reviewed the nursing notes.    I have reviewed the findings, diagnosis, plan and need for follow up with the patient.      New Prescriptions    No medications on file       Final diagnoses:   Acute febrile illness- exposed to + strept pharnygitis.     This document serves as a record of the services and decisions personally performed and made by Clarke Chaudhary, *. It was created on his behalf by  Mounika Garcia, a trained medical scribe. The creation of this document is based the provider's statements to the medical scribe.  Mounika Garcia 8:14 AM 7/14/2018    Provider:   The information in this document, created by the medical scribe for me, accurately reflects the services I personally performed and the decisions made by me. I have reviewed and approved this document for accuracy prior to leaving the patient care area.  Clarke Chaudhary, * 8:14 AM 7/14/2018 7/14/2018   [facility] EMERGENCY DEPARTMENT     Clarke Chaudhary,   07/14/18 0956

## 2018-07-14 NOTE — DISCHARGE INSTRUCTIONS
Amoxicillin 500 mg 3 times daily ×7 days  Alternate Tylenol with ibuprofen 4 hours as needed for fever  Recheck in the clinic in 3 days of not better.

## 2018-07-16 LAB
BACTERIA SPEC CULT: NORMAL
SPECIMEN SOURCE: NORMAL

## 2018-10-29 ENCOUNTER — ALLIED HEALTH/NURSE VISIT (OUTPATIENT)
Dept: FAMILY MEDICINE | Facility: CLINIC | Age: 23
End: 2018-10-29
Payer: MEDICAID

## 2018-10-29 DIAGNOSIS — Z00.00 ROUTINE GENERAL MEDICAL EXAMINATION AT A HEALTH CARE FACILITY: Primary | ICD-10-CM

## 2018-10-29 PROCEDURE — 99207 ZZC NO CHARGE NURSE ONLY: CPT

## 2018-10-29 NOTE — MR AVS SNAPSHOT
After Visit Summary   10/29/2018    Shola Owens    MRN: [id number]           Patient Information     Date Of Birth          1995        Visit Information        Provider Department      10/29/2018 3:45 PM [place] FP/IM RN [facility]        Today's Diagnoses     Routine general medical examination at a health care facility    -  1       Follow-ups after your visit        Your next 10 appointments already scheduled     Nov 02, 2018  3:00 PM CDT   Nurse Only with [place] NB CMA/LPN   [facility]    [address] [place] [address]   [phone]            Nov 15, 2018 10:00 AM CST   Return Visit with Austen Lemus MD   [facility] ([facility])    [address], [address]  [facility] [address] [phone]            Nov 28, 2018  7:45 AM CST   LAB with [place] LAB   [facility] ([facility])    [address] [place] [address] [phone]           Please do not eat 10-12 hours before your appointment if you are coming in fasting for labs on lipids, cholesterol, or glucose (sugar). This does not apply to pregnant women. Water, hot tea and black coffee (with nothing added) are okay. Do not drink other fluids, diet soda or chew gum.            Nov 28, 2018  8:00 AM CST   ecg with [place] CARDIAC SERVICES   [facility] ([facility])    [address] [facility] [address] [phone]              Future tests that were ordered for you today     Open Future Orders        Priority Expected Expires Ordered    EKG 12-LEAD CLINIC READ ([facility])- to be scheduled Routine 10/29/2018 10/29/2019 10/29/2018            Who to contact     If you have questions or need follow up information about today's clinic visit or your schedule please contact [facility] directly at [phone].  Normal or non-critical lab  "and imaging results will be communicated to you by MyChart, letter or phone within 4 business days after the clinic has received the results. If you do not hear from us within 7 days, please contact the clinic through [employer] or phone. If you have a critical or abnormal lab result, we will notify you by phone as soon as possible.  Submit refill requests through [employer] or call your pharmacy and they will forward the refill request to us. Please allow 3 business days for your refill to be completed.          Additional Information About Your Visit        [email or web address] Information     [employer] lets you send messages to your doctor, view your test results, renew your prescriptions, schedule appointments and more. To sign up, go to [email or web address] . Click on \"Log in\" on the left side of the screen, which will take you to the Welcome page. Then click on \"Sign up Now\" on the right side of the page.     You will be asked to enter the access code listed below, as well as some personal information. Please follow the directions to create your username and password.     Your access code is: [email or web address]  Expires: 2019  3:50 PM     Your access code will  in 90 days. If you need help or a new code, please call your [place] clinic or [phone].        Care EveryWhere ID     This is your Care EveryWhere ID. This could be used by other organizations to access your [place] medical records  [id number]         Blood Pressure from Last 3 Encounters:   18 153/80   18 123/63   17 120/61    Weight from Last 3 Encounters:   18 185 lb (83.9 kg)   18 187 lb (84.8 kg)   17 194 lb 12.8 oz (88.4 kg)              Today, you had the following     No orders found for display       Primary Care Provider Office Phone # Fax #    Shola Nelson [phone] [address] [address]        Equal Access to Services     SOLITARIO DRUMMOND AH: Winston vasquez " Soken, waveronicada luqadaha, qaybta kaalmada davis, delvin julioin hayaan juanalisa khanhkarissa laSherryjonathon yady. So [facility] [phone].    ATENCIÓN: Si loyda ha, tiene a lagos disposición servicios gratuitos de asistencia lingüística. Genoveva al [phone].    We comply with applicable federal civil rights laws and [place] laws. We do not discriminate on the basis of race, color, national origin, age, disability, sex, sexual orientation, or gender identity.            Thank you!     Thank you for choosing [facility]  for your care. Our goal is always to provide you with excellent care. Hearing back from our patients is one way we can continue to improve our services. Please take a few minutes to complete the written survey that you may receive in the mail after your visit with us. Thank you!             Your Updated Medication List - Protect others around you: Learn how to safely use, store and throw away your medicines at www.disposemymeds.org.          This list is accurate as of 10/29/18  3:50 PM.  Always use your most recent med list.                   Brand Name Dispense Instructions for use Diagnosis    cetirizine 10 MG tablet    zyrTEC    100 tablet    Take 1 tablet (10 mg) by mouth daily    Seasonal allergic rhinitis       * divalproex sodium extended-release 500 MG 24 hr tablet    DEPAKOTE ER    90 tablet    Take 500 mg in AM, 1000 mg in PM.    Seizure disorder (H)       * divalproex sodium extended-release 500 MG 24 hr tablet    DEPAKOTE ER    45 tablet    Take 500 mg in AM, 1000 mg in PM.    Seizure disorder (H)       esomeprazole 40 MG CR capsule    nexIUM    30 capsule    Take 1 capsule (40 mg) by mouth every morning (before breakfast) Take 30-60 minutes before a eating. NEEDS APPT    Routine general medical examination at a health care facility, Gastroesophageal reflux disease without esophagitis       guanFACINE 1 MG tablet    TENEX    60 tablet    Take 1 tablet (1 mg) by mouth 2 times daily     Active autistic disorder       * lamoTRIgine 100 MG tablet    LAMICTAL    60 tablet    TAKE 1 TAB TWICE DAILY (beginning on 5/2/16).    Seizure disorder (H)       * lamoTRIgine 100 MG tablet    LAMICTAL    30 tablet    TAKE 1 TAB TWICE DAILY (beginning on 5/2/16).    Seizure disorder (H)       montelukast 10 MG tablet    SINGULAIR    20 tablet    Take 1 tablet (10 mg) by mouth At Bedtime    Gastroesophageal reflux disease without esophagitis       MULTIVITAMINS PO           * ziprasidone 40 MG capsule    GEODON    60 capsule    Take 1 capsule (40 mg) by mouth 2 times daily (with meals) AM    Active autistic disorder       * ziprasidone 80 MG capsule    GEODON    60 capsule    Take 1 capsule (80 mg) by mouth 2 times daily (with meals) AM AND PM    Active autistic disorder       * Notice:  This list has 6 medication(s) that are the same as other medications prescribed for you. Read the directions carefully, and ask your doctor or other care provider to review them with you.

## 2018-11-02 ENCOUNTER — ALLIED HEALTH/NURSE VISIT (OUTPATIENT)
Dept: FAMILY MEDICINE | Facility: CLINIC | Age: 23
End: 2018-11-02
Payer: MEDICAID

## 2018-11-02 DIAGNOSIS — Z23 NEED FOR PROPHYLACTIC VACCINATION AND INOCULATION AGAINST INFLUENZA: Primary | ICD-10-CM

## 2018-11-02 PROCEDURE — 90686 IIV4 VACC NO PRSV 0.5 ML IM: CPT

## 2018-11-02 PROCEDURE — 99207 ZZC NO CHARGE NURSE ONLY: CPT

## 2018-11-02 PROCEDURE — 90471 IMMUNIZATION ADMIN: CPT

## 2018-11-02 NOTE — MR AVS SNAPSHOT
After Visit Summary   11/2/2018    Shola Owens    MRN: [id number]           Patient Information     Date Of Birth          1995        Visit Information        Provider Department      11/2/2018 3:00 PM [place] NB CMA/LPN [facility]        Today's Diagnoses     Need for prophylactic vaccination and inoculation against influenza    -  1       Follow-ups after your visit        Your next 10 appointments already scheduled     Nov 15, 2018 10:00 AM CST   Return Visit with Austen Lemus MD   [facility]    [address], [address]  [place] [address]   [phone]            Nov 28, 2018  7:45 AM CST   LAB with [place] [facility]   [facility]    [address] [address] [phone]           Please do not eat 10-12 hours before your appointment if you are coming in fasting for labs on lipids, cholesterol, or glucose (sugar). This does not apply to pregnant women. Water, hot tea and black coffee (with nothing added) are okay. Do not drink other fluids, diet soda or chew gum.            Nov 28, 2018  8:00 AM CST   ecg with [place] [facility]   [facility] ([facility])    [address] [address] [phone]              Future tests that were ordered for you today     Open Future Orders        Priority Expected Expires Ordered    CBC with platelets and differential Routine  3/29/2019 10/29/2018    Valproic acid Routine  3/29/2019 10/29/2018    Amylase Routine  3/29/2019 10/29/2018    Comprehensive metabolic panel (BMP + Alb, Alk Phos, ALT, AST, Total. Bili, TP) Routine  3/29/2019 10/29/2018    Bilirubin direct Routine  3/29/2019 10/29/2018    Hemoglobin A1c Routine  3/29/2019 10/29/2018    Lipid Profile (Chol, Trig, HDL, LDL calc) Routine  3/29/2019 10/29/2018    TSH Routine  3/29/2019 10/29/2018    T4, free Routine  3/29/2019  "10/29/2018            Who to contact     If you have questions or need follow up information about today's clinic visit or your schedule please contact [facility] directly at [phone].  Normal or non-critical lab and imaging results will be communicated to you by MyChart, letter or phone within 4 business days after the clinic has received the results. If you do not hear from us within 7 days, please contact the clinic through MyChart or phone. If you have a critical or abnormal lab result, we will notify you by phone as soon as possible.  Submit refill requests through [employer] or call your pharmacy and they will forward the refill request to us. Please allow 3 business days for your refill to be completed.          Additional Information About Your Visit        [facility] Information     [employer] lets you send messages to your doctor, view your test results, renew your prescriptions, schedule appointments and more. To sign up, go to [email or web address] . Click on \"Log in\" on the left side of the screen, which will take you to the Welcome page. Then click on \"Sign up Now\" on the right side of the page.     You will be asked to enter the access code listed below, as well as some personal information. Please follow the directions to create your username and password.     Your access code is: [email or web address]  Expires: 2019  3:50 PM     Your access code will  in 90 days. If you need help or a new code, please call your [place] clinic or [phone].        Care EveryWhere ID     This is your Care EveryWhere ID. This could be used by other organizations to access your [place] medical records  [id number]         Blood Pressure from Last 3 Encounters:   18 153/80   18 123/63   17 120/61    Weight from Last 3 Encounters:   18 185 lb (83.9 kg)   18 187 lb (84.8 kg)   17 194 lb 12.8 oz (88.4 kg)              We Performed the Following     FLU VACCINE, " SPLIT VIRUS, IM (QUADRIVALENT) [69777]- >3 YRS     Vaccine Administration, Initial [46866]        Primary Care Provider Office Phone # Fax #    Shola Nelson [phone] [address] [address]        Equal Access to Services     SOLITARIO DRUMMOND : Hadii aad ku hadasho Soomaali, waaxda luqadaha, qaybta kaalmada adeegyada, waxay sheryl hayjonathon cassidyjimclaudio conteh. So [facility] [phone].    ATENCIÓN: Si habla español, tiene a lagos disposición servicios gratuitos de asistencia lingüística. [facility] [phone].    We comply with applicable federal civil rights laws and [place] laws. We do not discriminate on the basis of race, color, national origin, age, disability, sex, sexual orientation, or gender identity.            Thank you!     Thank you for choosing [facility]  for your care. Our goal is always to provide you with excellent care. Hearing back from our patients is one way we can continue to improve our services. Please take a few minutes to complete the written survey that you may receive in the mail after your visit with us. Thank you!             Your Updated Medication List - Protect others around you: Learn how to safely use, store and throw away your medicines at www.disposemymeds.org.          This list is accurate as of 11/2/18  3:15 PM.  Always use your most recent med list.                   Brand Name Dispense Instructions for use Diagnosis    cetirizine 10 MG tablet    zyrTEC    100 tablet    Take 1 tablet (10 mg) by mouth daily    Seasonal allergic rhinitis       * divalproex sodium extended-release 500 MG 24 hr tablet    DEPAKOTE ER    90 tablet    Take 500 mg in AM, 1000 mg in PM.    Seizure disorder (H)       * divalproex sodium extended-release 500 MG 24 hr tablet    DEPAKOTE ER    45 tablet    Take 500 mg in AM, 1000 mg in PM.    Seizure disorder (H)       esomeprazole 40 MG CR capsule    nexIUM    30 capsule    Take 1 capsule (40 mg)  by mouth every morning (before breakfast) Take 30-60 minutes before a eating. NEEDS APPT    Routine general medical examination at a health care facility, Gastroesophageal reflux disease without esophagitis       guanFACINE 1 MG tablet    TENEX    60 tablet    Take 1 tablet (1 mg) by mouth 2 times daily    Active autistic disorder       * lamoTRIgine 100 MG tablet    LAMICTAL    60 tablet    TAKE 1 TAB TWICE DAILY (beginning on 5/2/16).    Seizure disorder (H)       * lamoTRIgine 100 MG tablet    LAMICTAL    30 tablet    TAKE 1 TAB TWICE DAILY (beginning on 5/2/16).    Seizure disorder (H)       montelukast 10 MG tablet    SINGULAIR    20 tablet    Take 1 tablet (10 mg) by mouth At Bedtime    Gastroesophageal reflux disease without esophagitis       MULTIVITAMINS PO           * ziprasidone 40 MG capsule    GEODON    60 capsule    Take 1 capsule (40 mg) by mouth 2 times daily (with meals) AM    Active autistic disorder       * ziprasidone 80 MG capsule    GEODON    60 capsule    Take 1 capsule (80 mg) by mouth 2 times daily (with meals) AM AND PM    Active autistic disorder       * Notice:  This list has 6 medication(s) that are the same as other medications prescribed for you. Read the directions carefully, and ask your doctor or other care provider to review them with you.

## 2018-11-02 NOTE — PROGRESS NOTES

## 2018-11-15 ENCOUNTER — OFFICE VISIT (OUTPATIENT)
Dept: NEUROLOGY | Facility: CLINIC | Age: 23
End: 2018-11-15
Payer: MEDICAID

## 2018-11-15 VITALS
BODY MASS INDEX: 26.04 KG/M2 | WEIGHT: 197.4 LBS | DIASTOLIC BLOOD PRESSURE: 73 MMHG | RESPIRATION RATE: 16 BRPM | SYSTOLIC BLOOD PRESSURE: 137 MMHG

## 2018-11-15 DIAGNOSIS — G40.909 SEIZURE DISORDER (H): Primary | ICD-10-CM

## 2018-11-15 RX ORDER — LAMOTRIGINE 100 MG/1
TABLET ORAL
Qty: 60 TABLET | Refills: 11 | Status: SHIPPED | OUTPATIENT
Start: 2018-11-15 | End: 2019-11-14

## 2018-11-15 RX ORDER — DIVALPROEX SODIUM 500 MG/1
TABLET, EXTENDED RELEASE ORAL
Qty: 90 TABLET | Refills: 11 | Status: SHIPPED | OUTPATIENT
Start: 2018-11-15 | End: 2019-11-14

## 2018-11-15 ASSESSMENT — PAIN SCALES - GENERAL: PAINLEVEL: NO PAIN (0)

## 2018-11-15 NOTE — PROGRESS NOTES
[facility] Progress Note      Patient:  Shola Owens  :  1995   Age:  23 year old   Today's Office Visit:  11/15/2018    Epilepsy Data:    Patient History  Primary Epileptologist/Provider: Austen Lemus M.D.  Patient Status: Controlled  Epilepsy Syndrome: Generalized Epilepsy unspecified  Epilepsy Syndrome Status: Undetermined - Evaluation in Progress  Age of Onset: Age 20  Etiology  : Unknown  Other Relevant Dx/ Issues: ASD, sleep disturbances     Tests/Surgery History  Last EE2016  Last MRI: 2015    Seizure Record  Current Visit Date: 11/15/18  Previous Visit Date: 17  Months since last visit: 11.04  Seizure Type 1: Tonic-clonic seizures  Description of Sz Type 1: cyanosis, foaming of the mouth, unresponsive, tonic clonic movements for 1 minute  # of Type 1 Seizure since last visit: 0  Freq. Type 1 / Month: 0        Current Outpatient Prescriptions   Medication Sig Dispense Refill     cetirizine (ZYRTEC) 10 MG tablet Take 1 tablet (10 mg) by mouth daily 100 tablet 3     divalproex (DEPAKOTE ER) 500 MG 24 hr tablet Take 500 mg in AM, 1000 mg in PM. 45 tablet 0     guanFACINE (TENEX) 1 MG tablet Take 1 tablet (1 mg) by mouth 2 times daily 60 tablet 0     lamoTRIgine (LAMICTAL) 100 MG tablet TAKE 1 TAB TWICE DAILY (beginning on 16). 60 tablet 11     lamoTRIgine (LAMICTAL) 100 MG tablet TAKE 1 TAB TWICE DAILY (beginning on 16). 30 tablet 0     montelukast (SINGULAIR) 10 MG tablet Take 1 tablet (10 mg) by mouth At Bedtime 20 tablet 0     Multiple Vitamin (MULTIVITAMINS PO)        ziprasidone (GEODON) 40 MG capsule Take 1 capsule (40 mg) by mouth 2 times daily (with meals) AM 60 capsule 0     ziprasidone (GEODON) 80 MG capsule Take 1 capsule (80 mg) by mouth 2 times daily (with meals) AM AND PM 60 capsule 0     divalproex (DEPAKOTE ER) 500 MG 24 hr tablet Take 500 mg in AM, 1000 mg in PM. 90 tablet 11     esomeprazole (NEXIUM) 40 MG CR capsule Take 1 capsule (40 mg) by mouth every  morning (before breakfast) Take 30-60 minutes before a eating. NEEDS APPT (Patient not taking: Reported on 11/15/2018) 30 capsule 0            CHIEF COMPLAINT: Seizures.    HISTORY OF PRESENT ILLNESS: This patient is a 22-year-old, right-handed male with a history of autistic spectrum disorder returns for follow up for seizure. He is accompanied by his father in the clinic today.  The patient himself cannot provide much history, and the majority of the history is provided by his father.  Sine the last visit, he had no seizures.  Patient had seizure onset in April 2015, another seizure 10 days after that,  both were described as GTCs.  He was initially started on keppra.  It was topped because of GI side effects.  Then he was started on Lamotrigine and Depakote was added later.  He is currently on Lamictal 100mg bid and Depakote 500-1000.  He had behavioral problems when he was on Lamictal 150mg bid.  His behavior has been really good since the last visit.  His father is really happy with the seizure and behavior control. His father is arranging his medications and he is compliant with the meds. No side effects were reported.  His last seizure was on 4/12/2016 that caused fractures of the right zygomatic arch, maxillary sinus and orbit.       According to the mother, on 04/30/2015, the patient was found at home on the kitchen floor. He was cyanotic. He had foaming at the mouth. He had some bleeding from the nose. He was unresponsive, and he had some tonic-clonic movement of his extremities. The clonic-tonic movement lasted for about 1 minute. The mother did not see the beginning of the event. There was no loss of bowel or bladder control. The patient did not bite his tongue. Afterwards the patient was very lethargic, still unresponsive until he was in the ambulance; then he was able to recognize his father and was able to communicate a little bit.    The patient had no prior history of seizures. He has been taking  Geodon for 7 years. No recent medication changes. No recent illness. When EMS arrived at the scene, the blood sugar was found to be 81.     TRIGGERS FOR SEIZURES: Unclear.    RISK FACTORS FOR SEIZURES: He has a history of autistic spectrum disorder. No history of head trauma with loss of consciousness. No history of CNS infection. No history of febrile convulsions.       Current Outpatient Prescriptions   Medication Sig Dispense Refill     cetirizine (ZYRTEC) 10 MG tablet Take 1 tablet (10 mg) by mouth daily 100 tablet 3     divalproex (DEPAKOTE ER) 500 MG 24 hr tablet Take 500 mg in AM, 1000 mg in PM. 45 tablet 0     guanFACINE (TENEX) 1 MG tablet Take 1 tablet (1 mg) by mouth 2 times daily 60 tablet 0     lamoTRIgine (LAMICTAL) 100 MG tablet TAKE 1 TAB TWICE DAILY (beginning on 5/2/16). 60 tablet 11     lamoTRIgine (LAMICTAL) 100 MG tablet TAKE 1 TAB TWICE DAILY (beginning on 5/2/16). 30 tablet 0     montelukast (SINGULAIR) 10 MG tablet Take 1 tablet (10 mg) by mouth At Bedtime 20 tablet 0     Multiple Vitamin (MULTIVITAMINS PO)        ziprasidone (GEODON) 40 MG capsule Take 1 capsule (40 mg) by mouth 2 times daily (with meals) AM 60 capsule 0     ziprasidone (GEODON) 80 MG capsule Take 1 capsule (80 mg) by mouth 2 times daily (with meals) AM AND PM 60 capsule 0     divalproex (DEPAKOTE ER) 500 MG 24 hr tablet Take 500 mg in AM, 1000 mg in PM. 90 tablet 11     esomeprazole (NEXIUM) 40 MG CR capsule Take 1 capsule (40 mg) by mouth every morning (before breakfast) Take 30-60 minutes before a eating. NEEDS APPT (Patient not taking: Reported on 11/15/2018) 30 capsule 0       PAST MEDICAL HISTORY: Autistic disorder, sleep disturbances.    FAMILY HISTORY: No family history of seizures. Grandmother had a history of migraine headaches. Mother had a history of anxiety and panic attacks. Father had a history of chemical dependency.    SOCIAL HISTORY: He is living with his parents. He had special education. He is single. No  alcohol, no drug abuse, no smoking. He is not working.    PHYSICAL EXAMINATION:   Blood pressure 137/73, resp. rate 16, weight 197 lb 6.4 oz (89.5 kg).    General exam: General Appearance: No acute distress. HEENT: Normocephalic, atramatic. Neck: Supple.  Extremities: No edema, no clubbing, no cyanosis.     Neurologic Exam: Alert and oriented x3. Patient is shy and had paucity of speech. Cranial Nerves: Pupils are equal, round, reactive to light and accomodation. Extraocular movement intact. No facial weakness or asymmetry. Hearing normal. Motor Exam: Normal. Coordination:no ataxia.  Gait and Station: normal.      REVIEW OF SYSTEMS:  Positive for intermittent behavioral issues.  Otherwise an 8 point review of systems is negative.     PREVIOUS DIAGNOSTIC TESTING: MRI of the brain on 04/30 showed a negative study. EEG on 05/01 showed a normal study.     IMPRESSION:    1. New-onset seizure.  This patient is a 22-year-old, right-handed male with a history of autistic spectrum disorder returns for follow up for seizure. He is accompanied by his father in the clinic today.  The patient himself cannot provide much history, and the majority of the history is provided by his father.  Sine the last visit, he had no seizures.  Patient had seizure onset in April 2015, another seizure 10 days after that,  both were described as GTCs.  He was initially started on keppra.  It was topped because of GI side effects.  Then he was started on Lamotrigine and Depakote was added later.  He is currently on Lamictal 100mg bid and Depakote 500-1000.  He had behavioral problems when he was on Lamictal 150mg bid.  His behavior has been really good since the last visit.  His father is really happy with the seizure and behavior control. His father is arranging his medications and he is compliant with the meds. No side effects were reported.  His last seizure was on 4/12/2016 that caused fractures of the right zygomatic arch, maxillary sinus and  orbit.       2. Autistic disorder.    3. Sleep disturbances.     PLAN:    1. Continue Lamotrigine 100 mg bid and Depakote 500 - 1000.  2. Depakote and Lamictal levels. AST, PLT.  3. Return to clinic in 12 months.      15 min was spent on the visit.  Over 50% of the time was spent on education, counseling about optimal seizure control and coordination of care.

## 2018-11-15 NOTE — LETTER
11/15/2018     RE: Shola Owens  : 1995   MRN: [id number]      Dear Colleague,    Thank you for referring your patient, Shola Owens, to the [facility] EPILEPSY CARE at [facility]. Please see a copy of my visit note below.    [facility]      Patient:  Shola Owens  :  1995   Age:  23 year old   Today's Office Visit:  11/15/2018    Epilepsy Data:    Patient History  Primary Epileptologist/Provider: Austen Lemus M.D.  Patient Status: Controlled  Epilepsy Syndrome: Generalized Epilepsy unspecified  Epilepsy Syndrome Status: Undetermined - Evaluation in Progress  Age of Onset: Age 20  Etiology  : Unknown  Other Relevant Dx/ Issues: ASD, sleep disturbances     Tests/Surgery History  Last EE2016  Last MRI: 2015    Seizure Record  Current Visit Date: 11/15/18  Previous Visit Date: 17  Months since last visit: 11.04  Seizure Type 1: Tonic-clonic seizures  Description of Sz Type 1: cyanosis, foaming of the mouth, unresponsive, tonic clonic movements for 1 minute  # of Type 1 Seizure since last visit: 0  Freq. Type 1 / Month: 0        Current Outpatient Prescriptions   Medication Sig Dispense Refill     cetirizine (ZYRTEC) 10 MG tablet Take 1 tablet (10 mg) by mouth daily 100 tablet 3     divalproex (DEPAKOTE ER) 500 MG 24 hr tablet Take 500 mg in AM, 1000 mg in PM. 45 tablet 0     guanFACINE (TENEX) 1 MG tablet Take 1 tablet (1 mg) by mouth 2 times daily 60 tablet 0     lamoTRIgine (LAMICTAL) 100 MG tablet TAKE 1 TAB TWICE DAILY (beginning on 16). 60 tablet 11     lamoTRIgine (LAMICTAL) 100 MG tablet TAKE 1 TAB TWICE DAILY (beginning on 16). 30 tablet 0     montelukast (SINGULAIR) 10 MG tablet Take 1 tablet (10 mg) by mouth At Bedtime 20 tablet 0     Multiple Vitamin (MULTIVITAMINS PO)        ziprasidone (GEODON) 40 MG capsule Take 1 capsule (40 mg) by mouth 2 times daily (with meals) AM 60 capsule 0     ziprasidone (GEODON) 80 MG  capsule Take 1 capsule (80 mg) by mouth 2 times daily (with meals) AM AND PM 60 capsule 0     divalproex (DEPAKOTE ER) 500 MG 24 hr tablet Take 500 mg in AM, 1000 mg in PM. 90 tablet 11     esomeprazole (NEXIUM) 40 MG CR capsule Take 1 capsule (40 mg) by mouth every morning (before breakfast) Take 30-60 minutes before a eating. NEEDS APPT (Patient not taking: Reported on 11/15/2018) 30 capsule 0            CHIEF COMPLAINT: Seizures.    HISTORY OF PRESENT ILLNESS: This patient is a 22-year-old, right-handed male with a history of autistic spectrum disorder returns for follow up for seizure. He is accompanied by his father in the clinic today.  The patient himself cannot provide much history, and the majority of the history is provided by his father.  Sine the last visit, he had no seizures.  Patient had seizure onset in April 2015, another seizure 10 days after that,  both were described as GTCs.  He was initially started on keppra.  It was topped because of GI side effects.  Then he was started on Lamotrigine and Depakote was added later.  He is currently on Lamictal 100mg bid and Depakote 500-1000.  He had behavioral problems when he was on Lamictal 150mg bid.  His behavior has been really good since the last visit.  His father is really happy with the seizure and behavior control. His father is arranging his medications and he is compliant with the meds. No side effects were reported.  His last seizure was on 4/12/2016 that caused fractures of the right zygomatic arch, maxillary sinus and orbit.       According to the mother, on 04/30/2015, the patient was found at home on the kitchen floor. He was cyanotic. He had foaming at the mouth. He had some bleeding from the nose. He was unresponsive, and he had some tonic-clonic movement of his extremities. The clonic-tonic movement lasted for about 1 minute. The mother did not see the beginning of the event. There was no loss of bowel or bladder control. The patient did  not bite his tongue. Afterwards the patient was very lethargic, still unresponsive until he was in the ambulance; then he was able to recognize his father and was able to communicate a little bit.    The patient had no prior history of seizures. He has been taking Geodon for 7 years. No recent medication changes. No recent illness. When EMS arrived at the scene, the blood sugar was found to be 81.     TRIGGERS FOR SEIZURES: Unclear.    RISK FACTORS FOR SEIZURES: He has a history of autistic spectrum disorder. No history of head trauma with loss of consciousness. No history of CNS infection. No history of febrile convulsions.       Current Outpatient Prescriptions   Medication Sig Dispense Refill     cetirizine (ZYRTEC) 10 MG tablet Take 1 tablet (10 mg) by mouth daily 100 tablet 3     divalproex (DEPAKOTE ER) 500 MG 24 hr tablet Take 500 mg in AM, 1000 mg in PM. 45 tablet 0     guanFACINE (TENEX) 1 MG tablet Take 1 tablet (1 mg) by mouth 2 times daily 60 tablet 0     lamoTRIgine (LAMICTAL) 100 MG tablet TAKE 1 TAB TWICE DAILY (beginning on 5/2/16). 60 tablet 11     lamoTRIgine (LAMICTAL) 100 MG tablet TAKE 1 TAB TWICE DAILY (beginning on 5/2/16). 30 tablet 0     montelukast (SINGULAIR) 10 MG tablet Take 1 tablet (10 mg) by mouth At Bedtime 20 tablet 0     Multiple Vitamin (MULTIVITAMINS PO)        ziprasidone (GEODON) 40 MG capsule Take 1 capsule (40 mg) by mouth 2 times daily (with meals) AM 60 capsule 0     ziprasidone (GEODON) 80 MG capsule Take 1 capsule (80 mg) by mouth 2 times daily (with meals) AM AND PM 60 capsule 0     divalproex (DEPAKOTE ER) 500 MG 24 hr tablet Take 500 mg in AM, 1000 mg in PM. 90 tablet 11     esomeprazole (NEXIUM) 40 MG CR capsule Take 1 capsule (40 mg) by mouth every morning (before breakfast) Take 30-60 minutes before a eating. NEEDS APPT (Patient not taking: Reported on 11/15/2018) 30 capsule 0       PAST MEDICAL HISTORY: Autistic disorder, sleep disturbances.    FAMILY HISTORY: No  family history of seizures. Grandmother had a history of migraine headaches. Mother had a history of anxiety and panic attacks. Father had a history of chemical dependency.    SOCIAL HISTORY: He is living with his parents. He had special education. He is single. No alcohol, no drug abuse, no smoking. He is not working.    PHYSICAL EXAMINATION:   Blood pressure 137/73, resp. rate 16, weight 197 lb 6.4 oz (89.5 kg).    General exam: General Appearance: No acute distress. HEENT: Normocephalic, atramatic. Neck: Supple.  Extremities: No edema, no clubbing, no cyanosis.     Neurologic Exam: Alert and oriented x3. Patient is shy and had paucity of speech. Cranial Nerves: Pupils are equal, round, reactive to light and accomodation. Extraocular movement intact. No facial weakness or asymmetry. Hearing normal. Motor Exam: Normal. Coordination:no ataxia.  Gait and Station: normal.      REVIEW OF SYSTEMS:  Positive for intermittent behavioral issues.  Otherwise an 8 point review of systems is negative.     PREVIOUS DIAGNOSTIC TESTING: MRI of the brain on 04/30 showed a negative study. EEG on 05/01 showed a normal study.     IMPRESSION:    1. New-onset seizure.  This patient is a 22-year-old, right-handed male with a history of autistic spectrum disorder returns for follow up for seizure. He is accompanied by his father in the clinic today.  The patient himself cannot provide much history, and the majority of the history is provided by his father.  Sine the last visit, he had no seizures.  Patient had seizure onset in April 2015, another seizure 10 days after that,  both were described as GTCs.  He was initially started on keppra.  It was topped because of GI side effects.  Then he was started on Lamotrigine and Depakote was added later.  He is currently on Lamictal 100mg bid and Depakote 500-1000.  He had behavioral problems when he was on Lamictal 150mg bid.  His behavior has been really good since the last visit.  His father  is really happy with the seizure and behavior control. His father is arranging his medications and he is compliant with the meds. No side effects were reported.  His last seizure was on 4/12/2016 that caused fractures of the right zygomatic arch, maxillary sinus and orbit.       2. Autistic disorder.    3. Sleep disturbances.     PLAN:    1. Continue Lamotrigine 100 mg bid and Depakote 500 - 1000.  2. Depakote and Lamictal levels. AST, PLT.  3. Return to clinic in 12 months.    15 min was spent on the visit.  Over 50% of the time was spent on education, counseling about optimal seizure control and coordination of care.    Again, thank you for allowing me to participate in the care of your patient.      Sincerely,    Austen Lemus MD

## 2018-11-15 NOTE — MR AVS SNAPSHOT
After Visit Summary   11/15/2018    Shola Owens    MRN: [id number]           Patient Information     Date Of Birth          1995        Visit Information        Provider Department      11/15/2018 10:00 AM Austen Lemus MD [facility]        Today's Diagnoses     Seizure disorder (H)    -  1      Care Instructions      Times of Days  am pm        Medication Tablet Size Number of Tablets/Capsules Total Daily Dosage    Lamictal  100  1 1        200 mg    Depakote  500  1 2        1500 mg                                                                                                                 Carry this with you at all times.  CONTINUE TAKING YOUR OTHER MEDICATIONS AS PREVIOUSLY DIRECTED.      * * *Do not store medications in the bathroom.  Keep medications away from children!* * *             Follow-ups after your visit        Follow-up notes from your care team     Return in about 1 year (around 11/15/2019).      Your next 10 appointments already scheduled     Nov 28, 2018  7:45 AM CST   [facility] [place] [facility]   [facility]    [address] [address]   [phone]           Please do not eat 10-12 hours before your appointment if you are coming in fasting for labs on lipids, cholesterol, or glucose (sugar). This does not apply to pregnant women. Water, hot tea and black coffee (with nothing added) are okay. Do not drink other fluids, diet soda or chew gum.            Nov 28, 2018  8:00 AM CST   ecg with [facility]   [facility] ([facility])    [address] [address]   [phone]              Future tests that were ordered for you today     Open Future Orders        Priority Expected Expires Ordered    Valproic acid Routine  1/15/2019 11/15/2018    Platelet count Routine  1/15/2019 11/15/2018    AST Routine  1/15/2019 11/15/2018    Lamotrigine Level Routine   1/15/2019 11/15/2018    CBC with platelets and differential Routine  3/29/2019 10/29/2018    Valproic acid Routine  3/29/2019 10/29/2018    Amylase Routine  3/29/2019 10/29/2018    Comprehensive metabolic panel (BMP + Alb, Alk Phos, ALT, AST, Total. Bili, TP) Routine  3/29/2019 10/29/2018    Bilirubin direct Routine  3/29/2019 10/29/2018    Hemoglobin A1c Routine  3/29/2019 10/29/2018    Lipid Profile (Chol, Trig, HDL, LDL calc) Routine  3/29/2019 10/29/2018    TSH Routine  3/29/2019 10/29/2018    T4, free Routine  3/29/2019 10/29/2018            Who to contact     Please call your clinic at [phone] to:    Ask questions about your health    Make or cancel appointments    Discuss your medicines    Learn about your test results    Speak to your doctor            Additional Information About Your Visit        [email or web address] Information     [employer] is an electronic gateway that provides easy, online access to your medical records. With [employer], you can request a clinic appointment, read your test results, renew a prescription or communicate with your care team.     To sign up for [employer] visit the website at [email or web address]   You will be asked to enter the access code listed below, as well as some personal information. Please follow the directions to create your username and password.     Your access code is: [email or web address]  Expires: 2019  2:50 PM     Your access code will  in 90 days. If you need help or a new code, please contact your [facility] or call [phone] for assistance.        Care EveryWhere ID     This is your Care EveryWhere ID. This could be used by other organizations to access your [place] medical records  [id number]        Your Vitals Were     Respirations BMI (Body Mass Index)                16 26.04 kg/m2           Blood Pressure from Last 3 Encounters:   11/15/18 137/73   18 153/80   18 123/63    Weight from Last 3 Encounters:    11/15/18 197 lb 6.4 oz (89.5 kg)   07/14/18 185 lb (83.9 kg)   03/13/18 187 lb (84.8 kg)                 Where to get your medicines      These medications were sent to [id number] - [place], [place] - [address]  [address] [facility] [address]    Hours:  Closed 10-16-08 business to [facility] Phone:  [phone]     divalproex sodium extended-release 500 MG 24 hr tablet    lamoTRIgine 100 MG tablet          Primary Care Provider Office Phone # Fax #    Shola Nelson [phone] [address] [address]        Equal Access to Services     SOLITARIO DRUMMOND : Winston kohlero Soken, waaxda luqadaha, qaybta kaalmada adelisayakayla, delvin anaya . [facility] [phone].    ATENCIÓN: Si habla español, tiene a lagos disposición servicios gratuitos de asistencia lingüística. [facility] [phone].    We comply with applicable federal civil rights laws and [place] laws. We do not discriminate on the basis of race, color, national origin, age, disability, sex, sexual orientation, or gender identity.            Thank you!     Thank you for choosing [facility] EPILEPS[facility]  for your care. Our goal is always to provide you with excellent care. Hearing back from our patients is one way we can continue to improve our services. Please take a few minutes to complete the written survey that you may receive in the mail after your visit with us. Thank you!             Your Updated Medication List - Protect others around you: Learn how to safely use, store and throw away your medicines at www.disposemymeds.org.          This list is accurate as of 11/15/18 10:23 AM.  Always use your most recent med list.                   Brand Name Dispense Instructions for use Diagnosis    cetirizine 10 MG tablet    zyrTEC    100 tablet    Take 1 tablet (10 mg) by mouth daily    Seasonal allergic rhinitis       * divalproex sodium extended-release 500 MG  24 hr tablet    DEPAKOTE ER    45 tablet    Take 500 mg in AM, 1000 mg in PM.    Seizure disorder (H)       * divalproex sodium extended-release 500 MG 24 hr tablet    DEPAKOTE ER    90 tablet    Take 500 mg in AM, 1000 mg in PM.    Seizure disorder (H)       esomeprazole 40 MG CR capsule    nexIUM    30 capsule    Take 1 capsule (40 mg) by mouth every morning (before breakfast) Take 30-60 minutes before a eating. NEEDS APPT    Routine general medical examination at a health care facility, Gastroesophageal reflux disease without esophagitis       guanFACINE 1 MG tablet    TENEX    60 tablet    Take 1 tablet (1 mg) by mouth 2 times daily    Active autistic disorder       * lamoTRIgine 100 MG tablet    LAMICTAL    30 tablet    TAKE 1 TAB TWICE DAILY (beginning on 5/2/16).    Seizure disorder (H)       * lamoTRIgine 100 MG tablet    LAMICTAL    60 tablet    TAKE 1 TAB TWICE DAILY (beginning on 5/2/16).    Seizure disorder (H)       montelukast 10 MG tablet    SINGULAIR    20 tablet    Take 1 tablet (10 mg) by mouth At Bedtime    Gastroesophageal reflux disease without esophagitis       MULTIVITAMINS PO           * ziprasidone 40 MG capsule    GEODON    60 capsule    Take 1 capsule (40 mg) by mouth 2 times daily (with meals) AM    Active autistic disorder       * ziprasidone 80 MG capsule    GEODON    60 capsule    Take 1 capsule (80 mg) by mouth 2 times daily (with meals) AM AND PM    Active autistic disorder       * Notice:  This list has 6 medication(s) that are the same as other medications prescribed for you. Read the directions carefully, and ask your doctor or other care provider to review them with you.

## 2018-11-28 ENCOUNTER — HOSPITAL ENCOUNTER (OUTPATIENT)
Dept: CARDIOLOGY | Facility: CLINIC | Age: 23
Discharge: HOME | End: 2018-11-28
Attending: PHYSICIAN ASSISTANT | Admitting: PHYSICIAN ASSISTANT
Payer: MEDICAID

## 2018-11-28 DIAGNOSIS — G40.909 SEIZURE DISORDER (H): ICD-10-CM

## 2018-11-28 DIAGNOSIS — F84.0 ACTIVE AUTISTIC DISORDER: ICD-10-CM

## 2018-11-28 DIAGNOSIS — Z79.899 HIGH RISK MEDICATION USE: ICD-10-CM

## 2018-11-28 DIAGNOSIS — R56.9 SEIZURE (H): Primary | ICD-10-CM

## 2018-11-28 DIAGNOSIS — Z79.899 HIGH RISK MEDICATIONS (NOT ANTICOAGULANTS) LONG-TERM USE: ICD-10-CM

## 2018-11-28 LAB
ALBUMIN SERPL-MCNC: 3.6 G/DL (ref 3.4–5)
ALP SERPL-CCNC: 44 U/L (ref 40–150)
ALT SERPL W P-5'-P-CCNC: 19 U/L (ref 0–70)
AMYLASE SERPL-CCNC: 55 U/L (ref 30–110)
ANION GAP SERPL CALCULATED.3IONS-SCNC: 4 MMOL/L (ref 3–14)
AST SERPL W P-5'-P-CCNC: 11 U/L (ref 0–45)
AST SERPL W P-5'-P-CCNC: 13 U/L (ref 0–45)
BASOPHILS # BLD AUTO: 0 10E9/L (ref 0–0.2)
BASOPHILS NFR BLD AUTO: 0.4 %
BILIRUB DIRECT SERPL-MCNC: 0.1 MG/DL (ref 0–0.2)
BILIRUB SERPL-MCNC: 0.4 MG/DL (ref 0.2–1.3)
BUN SERPL-MCNC: 17 MG/DL (ref 7–30)
CALCIUM SERPL-MCNC: 8.3 MG/DL (ref 8.5–10.1)
CHLORIDE SERPL-SCNC: 110 MMOL/L (ref 94–109)
CHOLEST SERPL-MCNC: 136 MG/DL
CO2 SERPL-SCNC: 30 MMOL/L (ref 20–32)
CREAT SERPL-MCNC: 0.92 MG/DL (ref 0.66–1.25)
DIFFERENTIAL METHOD BLD: ABNORMAL
EOSINOPHIL # BLD AUTO: 0.1 10E9/L (ref 0–0.7)
EOSINOPHIL NFR BLD AUTO: 2.6 %
ERYTHROCYTE [DISTWIDTH] IN BLOOD BY AUTOMATED COUNT: 13 % (ref 10–15)
GFR SERPL CREATININE-BSD FRML MDRD: >90 ML/MIN/1.7M2
GLUCOSE SERPL-MCNC: 84 MG/DL (ref 70–99)
HBA1C MFR BLD: 5 % (ref 0–5.6)
HCT VFR BLD AUTO: 42.1 % (ref 40–53)
HDLC SERPL-MCNC: 43 MG/DL
HGB BLD-MCNC: 13.9 G/DL (ref 13.3–17.7)
LDLC SERPL CALC-MCNC: 82 MG/DL
LYMPHOCYTES # BLD AUTO: 2.4 10E9/L (ref 0.8–5.3)
LYMPHOCYTES NFR BLD AUTO: 44.5 %
MCH RBC QN AUTO: 30.5 PG (ref 26.5–33)
MCHC RBC AUTO-ENTMCNC: 33 G/DL (ref 31.5–36.5)
MCV RBC AUTO: 92 FL (ref 78–100)
MONOCYTES # BLD AUTO: 0.5 10E9/L (ref 0–1.3)
MONOCYTES NFR BLD AUTO: 10.2 %
NEUTROPHILS # BLD AUTO: 2.3 10E9/L (ref 1.6–8.3)
NEUTROPHILS NFR BLD AUTO: 42.3 %
NONHDLC SERPL-MCNC: 93 MG/DL
PLATELET # BLD AUTO: 138 10E9/L (ref 150–450)
POTASSIUM SERPL-SCNC: 4.2 MMOL/L (ref 3.4–5.3)
PROT SERPL-MCNC: 6.9 G/DL (ref 6.8–8.8)
RBC # BLD AUTO: 4.56 10E12/L (ref 4.4–5.9)
SODIUM SERPL-SCNC: 144 MMOL/L (ref 133–144)
T4 FREE SERPL-MCNC: 1.03 NG/DL (ref 0.76–1.46)
TRIGL SERPL-MCNC: 53 MG/DL
TSH SERPL DL<=0.005 MIU/L-ACNC: 7.07 MU/L (ref 0.4–4)
VALPROATE SERPL-MCNC: 60 MG/L (ref 50–100)
WBC # BLD AUTO: 5.3 10E9/L (ref 4–11)

## 2018-11-28 PROCEDURE — 84439 ASSAY OF FREE THYROXINE: CPT

## 2018-11-28 PROCEDURE — 36415 COLL VENOUS BLD VENIPUNCTURE: CPT | Performed by: PHYSICIAN ASSISTANT

## 2018-11-28 PROCEDURE — 80164 ASSAY DIPROPYLACETIC ACD TOT: CPT

## 2018-11-28 PROCEDURE — 84443 ASSAY THYROID STIM HORMONE: CPT

## 2018-11-28 PROCEDURE — 84450 TRANSFERASE (AST) (SGOT): CPT | Performed by: PSYCHIATRY & NEUROLOGY

## 2018-11-28 PROCEDURE — 80061 LIPID PANEL: CPT

## 2018-11-28 PROCEDURE — 93005 ELECTROCARDIOGRAM TRACING: CPT

## 2018-11-28 PROCEDURE — 85025 COMPLETE CBC W/AUTO DIFF WBC: CPT | Performed by: PHYSICIAN ASSISTANT

## 2018-11-28 PROCEDURE — 83036 HEMOGLOBIN GLYCOSYLATED A1C: CPT | Performed by: PHYSICIAN ASSISTANT

## 2018-11-28 PROCEDURE — 80053 COMPREHEN METABOLIC PANEL: CPT

## 2018-11-28 PROCEDURE — 82248 BILIRUBIN DIRECT: CPT

## 2018-11-28 PROCEDURE — 82150 ASSAY OF AMYLASE: CPT

## 2018-11-29 LAB — LAMOTRIGINE SERPL-MCNC: 7.7 UG/ML (ref 2.5–15)

## 2019-09-24 ENCOUNTER — IMMUNIZATION (OUTPATIENT)
Dept: FAMILY MEDICINE | Facility: CLINIC | Age: 24
End: 2019-09-24
Payer: MEDICAID

## 2019-09-24 DIAGNOSIS — Z23 NEED FOR PROPHYLACTIC VACCINATION AND INOCULATION AGAINST INFLUENZA: Primary | ICD-10-CM

## 2019-09-24 PROCEDURE — 90686 IIV4 VACC NO PRSV 0.5 ML IM: CPT

## 2019-09-24 PROCEDURE — 90471 IMMUNIZATION ADMIN: CPT

## 2019-09-24 PROCEDURE — 99207 ZZC NO CHARGE NURSE ONLY: CPT

## 2019-11-14 ENCOUNTER — OFFICE VISIT (OUTPATIENT)
Dept: NEUROLOGY | Facility: CLINIC | Age: 24
End: 2019-11-14
Payer: MEDICAID

## 2019-11-14 VITALS
HEART RATE: 69 BPM | BODY MASS INDEX: 29.24 KG/M2 | HEIGHT: 73 IN | WEIGHT: 220.6 LBS | SYSTOLIC BLOOD PRESSURE: 135 MMHG | DIASTOLIC BLOOD PRESSURE: 81 MMHG

## 2019-11-14 DIAGNOSIS — G40.909 SEIZURE DISORDER (H): ICD-10-CM

## 2019-11-14 RX ORDER — LAMOTRIGINE 100 MG/1
TABLET ORAL
Qty: 60 TABLET | Refills: 11 | Status: SHIPPED | OUTPATIENT
Start: 2019-11-14 | End: 2020-02-10

## 2019-11-14 RX ORDER — DIVALPROEX SODIUM 500 MG/1
TABLET, EXTENDED RELEASE ORAL
Qty: 90 TABLET | Refills: 11 | Status: SHIPPED | OUTPATIENT
Start: 2019-11-14 | End: 2020-11-25

## 2019-11-14 ASSESSMENT — PAIN SCALES - GENERAL: PAINLEVEL: NO PAIN (0)

## 2019-11-14 ASSESSMENT — MIFFLIN-ST. JEOR: SCORE: 2034.52

## 2019-11-14 NOTE — LETTER
2019     RE: Shola Owens  : 1995   MRN: [id number]      Dear Colleague,    Thank you for referring your patient, Shola Owens, to the [facility] EPILEPSY CARE at [facility]. Please see a copy of my visit note below.    [facility]      Patient:  Shola Owens  :  1995   Age:  24 year old   Today's Office Visit:  2019    Epilepsy Data:    Patient History  Primary Epileptologist/Provider: Austne Lemus M.D.  Patient Status: Controlled  Epilepsy Syndrome: Generalized Epilepsy unspecified  Epilepsy Syndrome Status: Undetermined - Evaluation in Progress  Age of Onset: Age 20  Etiology  : Unknown  Other Relevant Dx/ Issues: ASD, sleep disturbances     Tests/Surgery History  Last EE2016  Last MRI: 2015    Seizure Record  Current Visit Date: 19  Previous Visit Date: 11/15/18  Months since last visit: 11.96  Seizure Type 1: Tonic-clonic seizures  Description of Sz Type 1: cyanosis, foaming of the mouth, unresponsive, tonic clonic movements for 1 minute  # of Type 1 Seizure since last visit: 0  Freq. Type 1 / Month: 0      Current Outpatient Medications   Medication Sig Dispense Refill     cetirizine (ZYRTEC) 10 MG tablet Take 1 tablet (10 mg) by mouth daily 100 tablet 3     divalproex (DEPAKOTE ER) 500 MG 24 hr tablet Take 500 mg in AM, 1000 mg in PM. 45 tablet 0     divalproex sodium extended-release (DEPAKOTE ER) 500 MG 24 hr tablet Take 500 mg in AM, 1000 mg in PM. 90 tablet 11     esomeprazole (NEXIUM) 40 MG CR capsule Take 1 capsule (40 mg) by mouth every morning (before breakfast) Take 30-60 minutes before a eating. NEEDS APPT 30 capsule 0     guanFACINE (TENEX) 1 MG tablet Take 1 tablet (1 mg) by mouth 2 times daily 60 tablet 0     lamoTRIgine (LAMICTAL) 100 MG tablet TAKE 1 TAB TWICE DAILY (beginning on 16). 60 tablet 11     lamoTRIgine (LAMICTAL) 100 MG tablet TAKE 1 TAB TWICE DAILY (beginning on 16). 30 tablet  0     montelukast (SINGULAIR) 10 MG tablet Take 1 tablet (10 mg) by mouth At Bedtime 20 tablet 0     Multiple Vitamin (MULTIVITAMINS PO)        ziprasidone (GEODON) 40 MG capsule Take 1 capsule (40 mg) by mouth 2 times daily (with meals) AM 60 capsule 0     ziprasidone (GEODON) 80 MG capsule Take 1 capsule (80 mg) by mouth 2 times daily (with meals) AM AND PM 60 capsule 0         CHIEF COMPLAINT: Seizures.    HISTORY OF PRESENT ILLNESS: This patient is a 24-year-old, right-handed male with a history of autistic spectrum disorder returns for follow up for seizure. He is accompanied by his father in the clinic today.  The patient himself cannot provide much history, and the majority of the history is provided by his father.  Sine the last visit, he had no seizures.  Patient had seizure onset in April 2015, another seizure 10 days after that,  both were described as GTCs.  He was initially started on keppra.  It was stopped because of GI side effects.  Then he was started on Lamotrigine, Depakote was added later.  He is currently on Lamictal 100mg bid and Depakote 500-1000.  He had behavioral problems when he was on Lamictal 150 mg bid.  His behavior has been really good for the past 2 years.  His father is really happy with the seizure and behavior control. His father is arranging his medications and he is compliant with the meds. No side effects were reported.  His last seizure was on 4/12/2016 that caused fractures of the right zygomatic arch, maxillary sinus and orbit.       According to the mother, on 04/30/2015, the patient was found at home on the kitchen floor. He was cyanotic. He had foaming at the mouth. He had some bleeding from the nose. He was unresponsive, and he had some tonic-clonic movement of his extremities. The clonic-tonic movement lasted for about 1 minute. The mother did not see the beginning of the event. There was no loss of bowel or bladder control. The patient did not bite his tongue.  Afterwards the patient was very lethargic, still unresponsive until he was in the ambulance; then he was able to recognize his father and was able to communicate a little bit.    The patient had no prior history of seizures. He has been taking Geodon for 7 years. No recent medication changes. No recent illness. When EMS arrived at the scene, the blood sugar was found to be 81.     TRIGGERS FOR SEIZURES: Unclear.    RISK FACTORS FOR SEIZURES: He has a history of autistic spectrum disorder. No history of head trauma with loss of consciousness. No history of CNS infection. No history of febrile convulsions.       Current Outpatient Medications   Medication Sig Dispense Refill     cetirizine (ZYRTEC) 10 MG tablet Take 1 tablet (10 mg) by mouth daily 100 tablet 3     divalproex (DEPAKOTE ER) 500 MG 24 hr tablet Take 500 mg in AM, 1000 mg in PM. 45 tablet 0     divalproex sodium extended-release (DEPAKOTE ER) 500 MG 24 hr tablet Take 500 mg in AM, 1000 mg in PM. 90 tablet 11     esomeprazole (NEXIUM) 40 MG CR capsule Take 1 capsule (40 mg) by mouth every morning (before breakfast) Take 30-60 minutes before a eating. NEEDS APPT 30 capsule 0     guanFACINE (TENEX) 1 MG tablet Take 1 tablet (1 mg) by mouth 2 times daily 60 tablet 0     lamoTRIgine (LAMICTAL) 100 MG tablet TAKE 1 TAB TWICE DAILY (beginning on 5/2/16). 60 tablet 11     lamoTRIgine (LAMICTAL) 100 MG tablet TAKE 1 TAB TWICE DAILY (beginning on 5/2/16). 30 tablet 0     montelukast (SINGULAIR) 10 MG tablet Take 1 tablet (10 mg) by mouth At Bedtime 20 tablet 0     Multiple Vitamin (MULTIVITAMINS PO)        ziprasidone (GEODON) 40 MG capsule Take 1 capsule (40 mg) by mouth 2 times daily (with meals) AM 60 capsule 0     ziprasidone (GEODON) 80 MG capsule Take 1 capsule (80 mg) by mouth 2 times daily (with meals) AM AND PM 60 capsule 0       PAST MEDICAL HISTORY: Autistic disorder, sleep disturbances.    FAMILY HISTORY: No family history of seizures. Grandmother had a  "history of migraine headaches. Mother had a history of anxiety and panic attacks. Father had a history of chemical dependency.    SOCIAL HISTORY: He is living with his parents. He had special education. He is single. No alcohol, no drug abuse, no smoking. He is not working.    PHYSICAL EXAMINATION:   Blood pressure 135/81, pulse 69, height 6' 1\" (185.4 cm), weight 220 lb 9.6 oz (100.1 kg).    General exam: General Appearance: No acute distress. HEENT: Normocephalic, atramatic. Neck: Supple.  Extremities: No edema, no clubbing, no cyanosis.     Neurologic Exam: Alert and oriented x3. Patient is shy and had paucity of speech. Cranial Nerves: Pupils are equal, round, reactive to light and accomodation. Extraocular movement intact. No facial weakness or asymmetry. Hearing normal. Motor Exam: Normal. Coordination:no ataxia.  Gait and Station: normal.      REVIEW OF SYSTEMS:  Positive for intermittent behavioral issues.  Otherwise an 8 point review of systems is negative.     PREVIOUS DIAGNOSTIC TESTING: MRI of the brain on 04/30 showed a negative study. EEG on 05/01 showed a normal study.     IMPRESSION:    1. New-onset seizure.  This patient is a 24-year-old, right-handed male with a history of autistic spectrum disorder returns for follow up for seizure. Patient had seizure onset in April 2015, another seizure 10 days after that,  both were described as GTCs.  He was initially started on keppra.  It was stopped because of GI side effects.  Then he was started on Lamotrigine, Depakote was added later.  He is currently on Lamictal 100mg bid and Depakote 500-1000.  He had behavioral problems when he was on Lamictal 150 mg bid.  His behavior has been really good for the past 2 years.  His father is really happy with the seizure and behavior control. His father is arranging his medications and he is compliant with the meds. No side effects were reported.  His last seizure was on 4/12/2016 that caused fractures of the right " zygomatic arch, maxillary sinus and orbit.       2. Autistic disorder.    3. Sleep disturbances.     PLAN:    1. Continue Lamotrigine 100 mg bid and Depakote 500 - 1000.  2. Depakote and Lamictal levels. AST, PLT.  3. Return to clinic in 12 months.    15 min was spent on the visit.  Over 50% of the time was spent on education, counseling about optimal seizure control and coordination of care.    Again, thank you for allowing me to participate in the care of your patient.      Sincerely,    Austen Lemus MD

## 2019-11-14 NOTE — PROGRESS NOTES
[facility] Epilepsy Care Progress Note      Patient:  Shola Owens  :  1995   Age:  24 year old   Today's Office Visit:  2019    Epilepsy Data:    Patient History  Primary Epileptologist/Provider: Austen Lemus M.D.  Patient Status: Controlled  Epilepsy Syndrome: Generalized Epilepsy unspecified  Epilepsy Syndrome Status: Undetermined - Evaluation in Progress  Age of Onset: Age 20  Etiology  : Unknown  Other Relevant Dx/ Issues: ASD, sleep disturbances     Tests/Surgery History  Last EE2016  Last MRI: 2015    Seizure Record  Current Visit Date: 19  Previous Visit Date: 11/15/18  Months since last visit: 11  Seizure Type 1: Tonic-clonic seizures  Description of Sz Type 1: cyanosis, foaming of the mouth, unresponsive, tonic clonic movements for 1 minute  # of Type 1 Seizure since last visit: 0  Freq. Type 1 / Month: 0      Current Outpatient Medications   Medication Sig Dispense Refill     cetirizine (ZYRTEC) 10 MG tablet Take 1 tablet (10 mg) by mouth daily 100 tablet 3     divalproex (DEPAKOTE ER) 500 MG 24 hr tablet Take 500 mg in AM, 1000 mg in PM. 45 tablet 0     divalproex sodium extended-release (DEPAKOTE ER) 500 MG 24 hr tablet Take 500 mg in AM, 1000 mg in PM. 90 tablet 11     esomeprazole (NEXIUM) 40 MG CR capsule Take 1 capsule (40 mg) by mouth every morning (before breakfast) Take 30-60 minutes before a eating. NEEDS APPT 30 capsule 0     guanFACINE (TENEX) 1 MG tablet Take 1 tablet (1 mg) by mouth 2 times daily 60 tablet 0     lamoTRIgine (LAMICTAL) 100 MG tablet TAKE 1 TAB TWICE DAILY (beginning on 16). 60 tablet 11     lamoTRIgine (LAMICTAL) 100 MG tablet TAKE 1 TAB TWICE DAILY (beginning on 16). 30 tablet 0     montelukast (SINGULAIR) 10 MG tablet Take 1 tablet (10 mg) by mouth At Bedtime 20 tablet 0     Multiple Vitamin (MULTIVITAMINS PO)        ziprasidone (GEODON) 40 MG capsule Take 1 capsule (40 mg) by mouth 2 times daily (with meals) AM 60 capsule 0      ziprasidone (GEODON) 80 MG capsule Take 1 capsule (80 mg) by mouth 2 times daily (with meals) AM AND PM 60 capsule 0         CHIEF COMPLAINT: Seizures.    HISTORY OF PRESENT ILLNESS: This patient is a 24-year-old, right-handed male with a history of autistic spectrum disorder returns for follow up for seizure. He is accompanied by his father in the clinic today.  The patient himself cannot provide much history, and the majority of the history is provided by his father.  Sine the last visit, he had no seizures.  Patient had seizure onset in April 2015, another seizure 10 days after that,  both were described as GTCs.  He was initially started on keppra.  It was stopped because of GI side effects.  Then he was started on Lamotrigine, Depakote was added later.  He is currently on Lamictal 100mg bid and Depakote 500-1000.  He had behavioral problems when he was on Lamictal 150 mg bid.  His behavior has been really good for the past 2 years.  His father is really happy with the seizure and behavior control. His father is arranging his medications and he is compliant with the meds. No side effects were reported.  His last seizure was on 4/12/2016 that caused fractures of the right zygomatic arch, maxillary sinus and orbit.       According to the mother, on 04/30/2015, the patient was found at home on the kitchen floor. He was cyanotic. He had foaming at the mouth. He had some bleeding from the nose. He was unresponsive, and he had some tonic-clonic movement of his extremities. The clonic-tonic movement lasted for about 1 minute. The mother did not see the beginning of the event. There was no loss of bowel or bladder control. The patient did not bite his tongue. Afterwards the patient was very lethargic, still unresponsive until he was in the ambulance; then he was able to recognize his father and was able to communicate a little bit.    The patient had no prior history of seizures. He has been taking Geodon for 7 years.  No recent medication changes. No recent illness. When EMS arrived at the scene, the blood sugar was found to be 81.     TRIGGERS FOR SEIZURES: Unclear.    RISK FACTORS FOR SEIZURES: He has a history of autistic spectrum disorder. No history of head trauma with loss of consciousness. No history of CNS infection. No history of febrile convulsions.       Current Outpatient Medications   Medication Sig Dispense Refill     cetirizine (ZYRTEC) 10 MG tablet Take 1 tablet (10 mg) by mouth daily 100 tablet 3     divalproex (DEPAKOTE ER) 500 MG 24 hr tablet Take 500 mg in AM, 1000 mg in PM. 45 tablet 0     divalproex sodium extended-release (DEPAKOTE ER) 500 MG 24 hr tablet Take 500 mg in AM, 1000 mg in PM. 90 tablet 11     esomeprazole (NEXIUM) 40 MG CR capsule Take 1 capsule (40 mg) by mouth every morning (before breakfast) Take 30-60 minutes before a eating. NEEDS APPT 30 capsule 0     guanFACINE (TENEX) 1 MG tablet Take 1 tablet (1 mg) by mouth 2 times daily 60 tablet 0     lamoTRIgine (LAMICTAL) 100 MG tablet TAKE 1 TAB TWICE DAILY (beginning on 5/2/16). 60 tablet 11     lamoTRIgine (LAMICTAL) 100 MG tablet TAKE 1 TAB TWICE DAILY (beginning on 5/2/16). 30 tablet 0     montelukast (SINGULAIR) 10 MG tablet Take 1 tablet (10 mg) by mouth At Bedtime 20 tablet 0     Multiple Vitamin (MULTIVITAMINS PO)        ziprasidone (GEODON) 40 MG capsule Take 1 capsule (40 mg) by mouth 2 times daily (with meals) AM 60 capsule 0     ziprasidone (GEODON) 80 MG capsule Take 1 capsule (80 mg) by mouth 2 times daily (with meals) AM AND PM 60 capsule 0       PAST MEDICAL HISTORY: Autistic disorder, sleep disturbances.    FAMILY HISTORY: No family history of seizures. Grandmother had a history of migraine headaches. Mother had a history of anxiety and panic attacks. Father had a history of chemical dependency.    SOCIAL HISTORY: He is living with his parents. He had special education. He is single. No alcohol, no drug abuse, no smoking. He is  "not working.    PHYSICAL EXAMINATION:   Blood pressure 135/81, pulse 69, height 6' 1\" (185.4 cm), weight 220 lb 9.6 oz (100.1 kg).    General exam: General Appearance: No acute distress. HEENT: Normocephalic, atramatic. Neck: Supple.  Extremities: No edema, no clubbing, no cyanosis.     Neurologic Exam: Alert and oriented x3. Patient is shy and had paucity of speech. Cranial Nerves: Pupils are equal, round, reactive to light and accomodation. Extraocular movement intact. No facial weakness or asymmetry. Hearing normal. Motor Exam: Normal. Coordination:no ataxia.  Gait and Station: normal.      REVIEW OF SYSTEMS:  Positive for intermittent behavioral issues.  Otherwise an 8 point review of systems is negative.     PREVIOUS DIAGNOSTIC TESTING: MRI of the brain on 04/30 showed a negative study. EEG on 05/01 showed a normal study.     IMPRESSION:    1. New-onset seizure.  This patient is a 24-year-old, right-handed male with a history of autistic spectrum disorder returns for follow up for seizure. Patient had seizure onset in April 2015, another seizure 10 days after that,  both were described as GTCs.  He was initially started on keppra.  It was stopped because of GI side effects.  Then he was started on Lamotrigine, Depakote was added later.  He is currently on Lamictal 100mg bid and Depakote 500-1000.  He had behavioral problems when he was on Lamictal 150 mg bid.  His behavior has been really good for the past 2 years.  His father is really happy with the seizure and behavior control. His father is arranging his medications and he is compliant with the meds. No side effects were reported.  His last seizure was on 4/12/2016 that caused fractures of the right zygomatic arch, maxillary sinus and orbit.       2. Autistic disorder.    3. Sleep disturbances.     PLAN:    1. Continue Lamotrigine 100 mg bid and Depakote 500 - 1000.  2. Depakote and Lamictal levels. AST, PLT.  3. Return to clinic in 12 months.      15 min " was spent on the visit.  Over 50% of the time was spent on education, counseling about optimal seizure control and coordination of care.

## 2020-02-10 ENCOUNTER — TELEPHONE (OUTPATIENT)
Dept: NEUROLOGY | Facility: CLINIC | Age: 25
End: 2020-02-10

## 2020-02-10 DIAGNOSIS — G40.909 SEIZURE DISORDER (H): ICD-10-CM

## 2020-02-10 DIAGNOSIS — F84.0 ACTIVE AUTISTIC DISORDER: ICD-10-CM

## 2020-02-10 RX ORDER — LAMOTRIGINE 100 MG/1
TABLET ORAL
Qty: 75 TABLET | Refills: 5 | Status: SHIPPED | OUTPATIENT
Start: 2020-02-10 | End: 2020-07-30

## 2020-02-10 NOTE — TELEPHONE ENCOUNTER
"Patient History  Primary Epileptologist/Provider: Austen Lemus M.D.  Patient Status: Controlled  Epilepsy Syndrome: Generalized Epilepsy unspecified  Epilepsy Syndrome Status: Undetermined - Evaluation in Progress  Age of Onset: Age 20  Etiology  : Unknown  Other Relevant Dx/ Issues: ASD, sleep disturbances    Patient had a seizure last night  Was standing talking to his mother.  Head went up,  and made noises.  Body \"stiffened\" (but not like a board), no strong jerking but some twitching noted  Father was able to lower Shola to the bed.  The body seizure lasted 7-10 seconds  No tongue bite  No incontinence, was drooling heavily  Before he became fully coherent was mumbling, seemed like he was trying to finish his sentence, was coherant about 1-2 minutes after the seizure  Was not tired like he might have been in the past  Was a typical seizure  Shola did not get injured  Has been seizure free almost 4 year.      No medication changes  Has blood work scheduled for tomorrow, including AED levels  Having done in [facility]    AED - ANTIEPILEPTIC DRUGS 11/14/2019   levETIRAcetam (Oral) -   lamoTRIgine (Oral) TAKE 1 TAB TWICE DAILY (beginning on 5/2/16). (100 MG TABS)   divalproex sodium extended-release (Oral) Take 500 mg in AM, 1000 mg in PM. (500 MG TB24)     Results for SHOLA RODRIGUEZ (MRN [id number]) as of 2/10/2020 10:32   Ref. Range 6/17/2016 10:59 6/15/2017 09:35 11/28/2018 07:37 11/28/2018 07:39   Lamotrigine Level Latest Ref Range: 2.5 - 15.0 ug/mL 8.3 7.3  7.7   Valproic Acid Level Latest Ref Range: 50 - 100 mg/L 36 (L) 63 60        No sleep changes.  No signs of illness, no fever  No clear precipitating events.  No other signs of seizures since then,  "

## 2020-02-10 NOTE — TELEPHONE ENCOUNTER
What is the concern that needs to be addressed by a nurse? Pt had a seizure last night, mom would like a call back about this ASAP.     May a detailed message be left on voicemail? Yes     Date of last office visit: 11/14/19    Message routed to: [facility] RN Pool

## 2020-02-10 NOTE — TELEPHONE ENCOUNTER
Chart reviewed by .  Patient should increase the lamotrigine by 50mg/day to 150mg am and 100mg pm    Call attempts made to mother(guardian) and step father (Emergency contact), no answer, VM full on mothers phone.    Prescription sent to preferred pharmacy on file

## 2020-02-11 DIAGNOSIS — F84.0 ACTIVE AUTISTIC DISORDER: ICD-10-CM

## 2020-02-11 DIAGNOSIS — G40.909 SEIZURE DISORDER (H): ICD-10-CM

## 2020-02-11 DIAGNOSIS — Z79.899 ENCOUNTER FOR LONG-TERM (CURRENT) USE OF OTHER MEDICATIONS: Primary | ICD-10-CM

## 2020-02-11 LAB
ALBUMIN SERPL-MCNC: 3.8 G/DL (ref 3.4–5)
ALP SERPL-CCNC: 50 U/L (ref 40–150)
ALT SERPL W P-5'-P-CCNC: 26 U/L (ref 0–70)
ANION GAP SERPL CALCULATED.3IONS-SCNC: 5 MMOL/L (ref 3–14)
AST SERPL W P-5'-P-CCNC: 16 U/L (ref 0–45)
BASOPHILS # BLD AUTO: 0 10E9/L (ref 0–0.2)
BASOPHILS NFR BLD AUTO: 0.3 %
BILIRUB SERPL-MCNC: 0.4 MG/DL (ref 0.2–1.3)
BUN SERPL-MCNC: 18 MG/DL (ref 7–30)
CALCIUM SERPL-MCNC: 9.4 MG/DL (ref 8.5–10.1)
CHLORIDE SERPL-SCNC: 106 MMOL/L (ref 94–109)
CHOLEST SERPL-MCNC: 151 MG/DL
CO2 SERPL-SCNC: 29 MMOL/L (ref 20–32)
CREAT SERPL-MCNC: 0.9 MG/DL (ref 0.66–1.25)
DIFFERENTIAL METHOD BLD: ABNORMAL
EOSINOPHIL # BLD AUTO: 0.1 10E9/L (ref 0–0.7)
EOSINOPHIL NFR BLD AUTO: 1.8 %
ERYTHROCYTE [DISTWIDTH] IN BLOOD BY AUTOMATED COUNT: 13.1 % (ref 10–15)
GFR SERPL CREATININE-BSD FRML MDRD: >90 ML/MIN/{1.73_M2}
GLUCOSE SERPL-MCNC: 80 MG/DL (ref 70–99)
HBA1C MFR BLD: 4.8 % (ref 0–5.6)
HCT VFR BLD AUTO: 45.9 % (ref 40–53)
HDLC SERPL-MCNC: 44 MG/DL
HGB BLD-MCNC: 15.2 G/DL (ref 13.3–17.7)
LDLC SERPL CALC-MCNC: 88 MG/DL
LYMPHOCYTES # BLD AUTO: 1.8 10E9/L (ref 0.8–5.3)
LYMPHOCYTES NFR BLD AUTO: 23.3 %
MCH RBC QN AUTO: 30 PG (ref 26.5–33)
MCHC RBC AUTO-ENTMCNC: 33.1 G/DL (ref 31.5–36.5)
MCV RBC AUTO: 91 FL (ref 78–100)
MONOCYTES # BLD AUTO: 0.9 10E9/L (ref 0–1.3)
MONOCYTES NFR BLD AUTO: 11.4 %
NEUTROPHILS # BLD AUTO: 5 10E9/L (ref 1.6–8.3)
NEUTROPHILS NFR BLD AUTO: 63.2 %
NONHDLC SERPL-MCNC: 107 MG/DL
PLATELET # BLD AUTO: 136 10E9/L (ref 150–450)
POTASSIUM SERPL-SCNC: 4.2 MMOL/L (ref 3.4–5.3)
PROT SERPL-MCNC: 7.4 G/DL (ref 6.8–8.8)
RBC # BLD AUTO: 5.07 10E12/L (ref 4.4–5.9)
SODIUM SERPL-SCNC: 140 MMOL/L (ref 133–144)
TRIGL SERPL-MCNC: 93 MG/DL
VALPROATE SERPL-MCNC: 46 MG/L (ref 50–100)
WBC # BLD AUTO: 7.9 10E9/L (ref 4–11)

## 2020-02-11 PROCEDURE — 80061 LIPID PANEL: CPT | Performed by: PHYSICIAN ASSISTANT

## 2020-02-11 PROCEDURE — 80164 ASSAY DIPROPYLACETIC ACD TOT: CPT | Performed by: PSYCHIATRY & NEUROLOGY

## 2020-02-11 PROCEDURE — 85025 COMPLETE CBC W/AUTO DIFF WBC: CPT | Performed by: PHYSICIAN ASSISTANT

## 2020-02-11 PROCEDURE — 80053 COMPREHEN METABOLIC PANEL: CPT | Performed by: PHYSICIAN ASSISTANT

## 2020-02-11 PROCEDURE — 36415 COLL VENOUS BLD VENIPUNCTURE: CPT | Performed by: PSYCHIATRY & NEUROLOGY

## 2020-02-11 PROCEDURE — 83036 HEMOGLOBIN GLYCOSYLATED A1C: CPT | Performed by: PHYSICIAN ASSISTANT

## 2020-02-12 LAB — LAMOTRIGINE SERPL-MCNC: 8.2 UG/ML (ref 2.5–15)

## 2020-02-12 NOTE — TELEPHONE ENCOUNTER
Blood levels noted.    Results for JULIENNE RODRIGUEZ (MRN [id number]) as of 2/12/2020 14:48   Ref. Range 6/15/2017 09:35 11/28/2018 07:37 11/28/2018 07:39 2/11/2020 07:57   Lamotrigine Level Latest Ref Range: 2.5 - 15.0 ug/mL 7.3  7.7 8.2   Valproic Acid Level Latest Ref Range: 50 - 100 mg/L 63 60  46 (L)     Will message  for additional advice

## 2020-02-12 NOTE — TELEPHONE ENCOUNTER
Call placed to mother and plan discussed  Lab results discussed.  No other questions at this time, will wait for MD review of results and update mother as appropriate

## 2020-02-12 NOTE — TELEPHONE ENCOUNTER
Discussed with .  Continue with planned increase in QUIÑONEZ to 150-100, no other changes    Call placed to mother, no answer, voice mailbox full

## 2020-02-21 NOTE — TELEPHONE ENCOUNTER
Call returned, discussed that  is okay with the levels, and Shola should continue on the same plan for medications    No other questions at this time

## 2020-02-25 ENCOUNTER — HOSPITAL ENCOUNTER (EMERGENCY)
Facility: CLINIC | Age: 25
Discharge: HOME | End: 2020-02-25
Attending: NURSE PRACTITIONER | Admitting: NURSE PRACTITIONER
Payer: MEDICAID

## 2020-02-25 VITALS
HEART RATE: 72 BPM | BODY MASS INDEX: 30.34 KG/M2 | OXYGEN SATURATION: 98 % | WEIGHT: 230 LBS | RESPIRATION RATE: 18 BRPM | TEMPERATURE: 98 F | SYSTOLIC BLOOD PRESSURE: 131 MMHG | DIASTOLIC BLOOD PRESSURE: 80 MMHG

## 2020-02-25 DIAGNOSIS — L25.9 CONTACT DERMATITIS: ICD-10-CM

## 2020-02-25 DIAGNOSIS — J30.2 SEASONAL ALLERGIC RHINITIS: ICD-10-CM

## 2020-02-25 PROCEDURE — G0463 HOSPITAL OUTPT CLINIC VISIT: HCPCS | Performed by: NURSE PRACTITIONER

## 2020-02-25 PROCEDURE — 99214 OFFICE O/P EST MOD 30 MIN: CPT | Mod: Z6 | Performed by: NURSE PRACTITIONER

## 2020-02-25 RX ORDER — FAMOTIDINE 20 MG/1
20 TABLET, FILM COATED ORAL 2 TIMES DAILY
Qty: 14 TABLET | Refills: 0 | Status: SHIPPED | OUTPATIENT
Start: 2020-02-25 | End: 2020-08-18

## 2020-02-25 RX ORDER — TRIAMCINOLONE ACETONIDE 1 MG/G
CREAM TOPICAL
Qty: 80 G | Refills: 0 | Status: SHIPPED | OUTPATIENT
Start: 2020-02-25 | End: 2020-08-18

## 2020-02-25 RX ORDER — CETIRIZINE HYDROCHLORIDE 10 MG/1
10 TABLET ORAL 2 TIMES DAILY PRN
Qty: 100 TABLET | Refills: 3 | Status: SHIPPED | OUTPATIENT
Start: 2020-02-25 | End: 2021-01-21

## 2020-02-25 ASSESSMENT — ENCOUNTER SYMPTOMS
VOMITING: 0
BLOOD IN STOOL: 0
MYALGIAS: 0
SPEECH DIFFICULTY: 0
WHEEZING: 0
SORE THROAT: 0
EYE PAIN: 0
CHILLS: 0
ABDOMINAL PAIN: 0
NAUSEA: 0
SHORTNESS OF BREATH: 0
DIARRHEA: 0
FEVER: 0
COUGH: 0
DIAPHORESIS: 0
DIFFICULTY URINATING: 0
DYSURIA: 0
WOUND: 0
HEMATURIA: 0

## 2020-02-25 NOTE — ED PROVIDER NOTES
History     Chief Complaint   Patient presents with     Rash     HPI  Shola Owens is a 24 year old male who presents to urgent care with a rash noted on the left side of his arm this afternoon following unknown exposure.  Stepdad who is guardian denies any new foods, lotions, exposures to any new medications or clothing or detergents.  Patient reports being mildly pruritic.  Patient denies there being pain.  Patient denies any URI or other illness type symptoms.  Patient denies any nausea vomiting.  Patient reports feeling well otherwise.  They have not tried any therapies or treatments for this patient denies previous history of similar rashes.    Allergies:  Allergies   Allergen Reactions     Risperidone Hives       Problem List:    Patient Active Problem List    Diagnosis Date Noted     Zygomatic fracture, unspecified, initial encounter for closed fracture 04/18/2016     Priority: Medium     Closed fracture of maxilla (H) 04/18/2016     Priority: Medium     Seizure 04/30/2015     Priority: Medium     Disturbance in sleep behavior 08/31/2006     Priority: Medium     Doing well on Geodon managed by Dr. Sanders.  05/2013:  Geodon 120 mg BID.  Problem list name updated by automated process. Provider to review       Active autistic disorder 06/13/2005     Priority: Medium     Moderate to severe.  On Tenex and Cymbalta for anxiety.  Meds managed by Dr. Sanders.  [facility].  05/2013: Tenex 1 mg BID.  Started Zyprexa PRN for some increased aggression.  Reflux symptoms have been episodic since 11/2011. Associated with the size of the meals. Portion control is effective.   Problem list name updated by automated process. Provider to review          Past Medical History:    Past Medical History:   Diagnosis Date     Acne      Autistic disorder, current or active state      Mild persistent asthma        Past Surgical History:    No past surgical history on file.    Family History:    Family  History   Problem Relation Age of Onset     Hypertension Maternal Uncle      Hypertension Father      Hypertension Paternal Grandmother      Thyroid Disease Paternal Grandmother         hypo     Allergies Mother         seasonal     Heart Disease Maternal Grandmother         A. Fib     Thyroid Disease Maternal Grandmother         hypo     Cancer Maternal Grandfather         cancerous tumor in his arm     Cancer Paternal Grandfather         passed from lung cancer       Social History:  Marital Status:  Single [1]  Social History     Tobacco Use     Smoking status: Never Smoker     Smokeless tobacco: Never Used     Tobacco comment: outside smokers-not in the house   Substance Use Topics     Alcohol use: No     Alcohol/week: 0.0 standard drinks     Drug use: No        Medications:    cetirizine (ZYRTEC) 10 MG tablet  famotidine (PEPCID) 20 MG tablet  triamcinolone (KENALOG) 0.1 % external cream  divalproex sodium extended-release (DEPAKOTE ER) 500 MG 24 hr tablet  guanFACINE (TENEX) 1 MG tablet  lamoTRIgine (LAMICTAL) 100 MG tablet  montelukast (SINGULAIR) 10 MG tablet  Multiple Vitamin (MULTIVITAMINS PO)  ziprasidone (GEODON) 40 MG capsule  ziprasidone (GEODON) 80 MG capsule      Review of Systems   Constitutional: Negative for chills, diaphoresis and fever.   HENT: Negative for ear pain and sore throat.    Eyes: Negative for pain.   Respiratory: Negative for cough, shortness of breath and wheezing.    Cardiovascular: Negative for chest pain.   Gastrointestinal: Negative for abdominal pain, blood in stool, diarrhea, nausea and vomiting.   Genitourinary: Negative for difficulty urinating, dysuria and hematuria.   Musculoskeletal: Negative for myalgias.   Skin: Positive for rash (mildly pruritic rash noted on left arm). Negative for wound.   Neurological: Negative for speech difficulty.   Psychiatric/Behavioral: Negative for self-injury.   All other systems reviewed and are negative.      Physical Exam   BP:  131/80  Pulse: 72  Temp: 98  F (36.7  C)  Resp: 18  Weight: 104.3 kg (230 lb)  SpO2: 98 %      Physical Exam  Vitals signs and nursing note reviewed.   Constitutional:       General: He is not in acute distress.     Appearance: He is well-developed. He is not diaphoretic.   HENT:      Head: Normocephalic and atraumatic.      Right Ear: Hearing and external ear normal.      Left Ear: Hearing and external ear normal.      Nose: Nose normal.   Eyes:      General: No scleral icterus.        Right eye: No discharge.         Left eye: No discharge.      Conjunctiva/sclera: Conjunctivae normal.   Cardiovascular:      Rate and Rhythm: Normal rate and regular rhythm.      Heart sounds: Normal heart sounds. No murmur. No friction rub. No gallop.    Pulmonary:      Effort: Pulmonary effort is normal. No respiratory distress.      Breath sounds: Normal breath sounds. No stridor. No wheezing or rales.   Musculoskeletal:         General: No tenderness.   Skin:     General: Skin is warm.      Capillary Refill: Capillary refill takes less than 2 seconds.      Findings: Rash (prurititic morbilliform rash noted on left arm) present.   Neurological:      Mental Status: He is alert and oriented to person, place, and time.   Psychiatric:         Behavior: Behavior is cooperative.         ED Course        Procedures    No results found for this or any previous visit (from the past 24 hour(s)).    Medications - No data to display    Assessments & Plan (with Medical Decision Making)  Patient presents to urgent care with a 4 to 6-hour history of a mildly pruritic morbilliform type rash with unknown etiology and no associative URI type symptoms.  On examination patient has no signs of an anaphylactic type reaction or respiratory breathing difficulties.  Discussed with guardian will treat as possible reaction to unknown etiology.  Discussed care and treatment with famotidine and Zyrtec and cautious management and looking for any changing  symptoms or worsening symptoms with then return for reevaluation.  Parvin verbalized understanding and is agreement with plan of care.  There is no concern for underlying or overlying cellulitis.  Patient discharged in stable condition.     I have reviewed the nursing notes.    I have reviewed the findings, diagnosis, plan and need for follow up with the patient.    Discharge Medication List as of 2/25/2020  5:58 PM      START taking these medications    Details   famotidine (PEPCID) 20 MG tablet Take 1 tablet (20 mg) by mouth 2 times daily for 7 days, Disp-14 tablet, R-0, E-Prescribe      triamcinolone (KENALOG) 0.1 % external cream 80 grams- apply two three times daily for itching on rashDisp-80 g, [id number]             Final diagnoses:   Contact dermatitis       2/25/2020   [facility] EMERGENCY DEPARTMENT     Lexi Oviedo, DEYSI CNP  02/25/20 [id number]

## 2020-02-25 NOTE — ED AVS SNAPSHOT
[facility] Emergency Department  [address] [facility] [address]  Phone:  [phone]  Fax:  [phone]                                    Shola Owens   MRN: [id number]    Department:  [facility] Emergency Department   Date of Visit:  2/25/2020           After Visit Summary Signature Page    I have received my discharge instructions, and my questions have been answered. I have discussed any challenges I see with this plan with the nurse or doctor.    ..........................................................................................................................................  Patient/Patient Representative Signature      ..........................................................................................................................................  Patient Representative Print Name and Relationship to Patient    ..................................................               ................................................  Date                                   Time    ..........................................................................................................................................  Reviewed by Signature/Title    ...................................................              ..............................................  Date                                               Time          22EPIC Rev 08/18

## 2020-02-25 NOTE — DISCHARGE INSTRUCTIONS
Start famotidine and take 20 mg by mouth twice daily for 7 days  Apply triamcinolone to rash areas up three times daily as needed for pruritis/itching  Take zyrtec 10 mg by mouth twice daily for 7 days  Return if worsening or concerns of infection or if you develop shortness of breath

## 2020-04-28 ENCOUNTER — TELEPHONE (OUTPATIENT)
Dept: FAMILY MEDICINE | Facility: CLINIC | Age: 25
End: 2020-04-28

## 2020-07-27 DIAGNOSIS — G40.909 SEIZURE DISORDER (H): ICD-10-CM

## 2020-07-30 NOTE — TELEPHONE ENCOUNTER
lamoTRIgine (LAMICTAL) 100 MG   RF  2/10/20   75: 5  Last Clinic Visit:  11/14/19  NV:  NONE     RTC 1 YEAR     Filling per [facility] medication refill protocols - seizure medications.  Not all labs required.

## 2020-07-31 RX ORDER — LAMOTRIGINE 100 MG/1
TABLET ORAL
Qty: 75 TABLET | Refills: 3 | Status: SHIPPED | OUTPATIENT
Start: 2020-07-31 | End: 2020-08-20

## 2020-08-17 DIAGNOSIS — G40.909 SEIZURE DISORDER (H): Primary | ICD-10-CM

## 2020-08-17 NOTE — TELEPHONE ENCOUNTER
What is the concern that needs to be addressed by a nurse?   Patient had a seizure saturday night and dad would like a call back.     May a detailed message be left on voicemail? yes    Date of last office visit: 11/14/19    Message routed to: nurse

## 2020-08-17 NOTE — TELEPHONE ENCOUNTER
"Patient's father calls the office to report a seizure reoccurrence. 3-5 seconds, then out of it for another 30 seconds, jerking neck, vocalization (grunting), and eyes up to side.     Last seizures was about a year ago.     -1000  -100    Patient has gained weight. \"his eating is out of control\". No other factors believed to be involved. Denies illness, missed medications, medication changes.     PLAN: check AED levels. Adjust medications accordingly.   "

## 2020-08-17 NOTE — PROGRESS NOTES
Subjective     Shola Owens is a 25 year old male who presents to clinic today for the following health issues:    HPI     Cyst on Tailbone    Symptoms for months     States that cyst will swell up, fills up with blood, drains blood, heals. That has happed multiple times.    Has tried keeping it clean at home, but no treatment or evaluation for this in the past.      Elevated BP  Dad wants Pt to have his BP checked today, was high last time it was checked, but was shortly after a seizure. BP is usually low to to current medications.        Patient Active Problem List   Diagnosis     Active autistic disorder     Disturbance in sleep behavior     Seizure     Zygomatic fracture, unspecified, initial encounter for closed fracture     Closed fracture of maxilla (H)     History reviewed. No pertinent surgical history.    Social History     Tobacco Use     Smoking status: Never Smoker     Smokeless tobacco: Never Used     Tobacco comment: outside smokers-not in the house   Substance Use Topics     Alcohol use: No     Alcohol/week: 0.0 standard drinks     Family History   Problem Relation Age of Onset     Hypertension Maternal Uncle      Hypertension Father      Hypertension Paternal Grandmother      Thyroid Disease Paternal Grandmother         hypo     Allergies Mother         seasonal     Heart Disease Maternal Grandmother         A. Fib     Thyroid Disease Maternal Grandmother         hypo     Cancer Maternal Grandfather         cancerous tumor in his arm     Cancer Paternal Grandfather         passed from lung cancer         Current Outpatient Medications   Medication Sig Dispense Refill     cetirizine (ZYRTEC) 10 MG tablet Take 1 tablet (10 mg) by mouth 2 times daily as needed for allergies 100 tablet 3     divalproex sodium extended-release (DEPAKOTE ER) 500 MG 24 hr tablet Take 500 mg in AM, 1000 mg in PM. 90 tablet 11     guanFACINE (TENEX) 1 MG tablet Take 1 tablet (1 mg) by mouth 2 times daily 60 tablet 0      lamoTRIgine (LAMICTAL) 100 MG tablet TAKE 1.5 TABLETS BY MOUTH EVERY MORNING AND 1 TABLETS EVERY EVENING 75 tablet 3     montelukast (SINGULAIR) 10 MG tablet Take 1 tablet (10 mg) by mouth At Bedtime 20 tablet 3     Multiple Vitamin (MULTIVITAMINS PO)        ziprasidone (GEODON) 40 MG capsule Take 1 capsule (40 mg) by mouth 2 times daily (with meals) AM 60 capsule 0     ziprasidone (GEODON) 80 MG capsule Take 1 capsule (80 mg) by mouth 2 times daily (with meals) AM AND PM 60 capsule 0     Allergies   Allergen Reactions     Risperidone Hives       Reviewed and updated as needed this visit by Provider         Review of Systems   Constitutional, HEENT, cardiovascular, pulmonary, GI, , musculoskeletal, neuro, skin, endocrine and psych systems are negative, except as otherwise noted.      Objective    /68   Pulse 65   Temp 97.6  F (36.4  C) (Tympanic)   Resp 12   Wt 104.9 kg (231 lb 3.2 oz)   SpO2 97%   BMI 30.50 kg/m    Body mass index is 30.5 kg/m .  Physical Exam   GEN: alert, oriented x 3, NAD  SKIN: good turgor; one 4 mm round erythematous fibrous papule with central umbilication but no surrounding induration/fluctuance located on the superior aspect of the left intergluteal area - there is mildly palpable firm subcutaneous area that is ill defined tracking to medline    Diagnostic Test Results:  none         Assessment & Plan     Shola was seen today for cyst and hypertension.    Diagnoses and all orders for this visit:    Pilonidal cyst without abscess  -     GENERAL SURG ADULT REFERRAL; Future  Father was advised pathology and treatment of condition.  No sign of infection today to warrant abx or I&D.  May need surgical exploration and excision.  Father concurred with surg consult.  Return precautions discussed and given to patient's father.    Seasonal allergic rhinitis, unspecified trigger  -     montelukast (SINGULAIR) 10 MG tablet; Take 1 tablet (10 mg) by mouth At Bedtime  Father requested new  Rx for this med which patient uses prn if cetirizine is not enough to relieve spring and fall allergies.    Seizure disorder (H)  -     Valproic Acid level  -     Valproic Acid Free  -     Lamotrigine Level  Father said patient will have lab appt tomorrow for his neurology orders.  Released orders today since patient is already in clinic.      Patient Instructions   Go to Clinic [facility] to request schedule to see general surgeon for the pilonidal cyst.    No sign of infection today.  If with fever, swelling or foul-smelling discharge, see a doctor promptly.    Refilled singulair for his allergies.      No follow-ups on file.    Govind Whaley MD  [facility]

## 2020-08-18 ENCOUNTER — OFFICE VISIT (OUTPATIENT)
Dept: FAMILY MEDICINE | Facility: CLINIC | Age: 25
End: 2020-08-18
Payer: MEDICAID

## 2020-08-18 VITALS
RESPIRATION RATE: 12 BRPM | HEART RATE: 65 BPM | BODY MASS INDEX: 30.5 KG/M2 | DIASTOLIC BLOOD PRESSURE: 68 MMHG | OXYGEN SATURATION: 97 % | WEIGHT: 231.2 LBS | TEMPERATURE: 97.6 F | SYSTOLIC BLOOD PRESSURE: 138 MMHG

## 2020-08-18 DIAGNOSIS — G40.909 SEIZURE DISORDER (H): ICD-10-CM

## 2020-08-18 DIAGNOSIS — L05.91 PILONIDAL CYST WITHOUT ABSCESS: Primary | ICD-10-CM

## 2020-08-18 DIAGNOSIS — J30.2 SEASONAL ALLERGIC RHINITIS, UNSPECIFIED TRIGGER: ICD-10-CM

## 2020-08-18 LAB — VALPROATE SERPL-MCNC: 78 MG/L (ref 50–100)

## 2020-08-18 PROCEDURE — 36415 COLL VENOUS BLD VENIPUNCTURE: CPT | Performed by: PSYCHIATRY & NEUROLOGY

## 2020-08-18 PROCEDURE — 99214 OFFICE O/P EST MOD 30 MIN: CPT | Performed by: FAMILY MEDICINE

## 2020-08-18 PROCEDURE — 80164 ASSAY DIPROPYLACETIC ACD TOT: CPT | Performed by: PSYCHIATRY & NEUROLOGY

## 2020-08-18 RX ORDER — MONTELUKAST SODIUM 10 MG/1
10 TABLET ORAL AT BEDTIME
Qty: 20 TABLET | Refills: 3 | Status: SHIPPED | OUTPATIENT
Start: 2020-08-18 | End: 2021-08-23

## 2020-08-18 NOTE — NURSING NOTE
"Initial /68   Pulse 65   Temp 97.6  F (36.4  C) (Tympanic)   Resp 12   Wt 104.9 kg (231 lb 3.2 oz)   SpO2 97%   BMI 30.50 kg/m   Estimated body mass index is 30.5 kg/m  as calculated from the following:    Height as of 11/14/19: 1.854 m (6' 1\").    Weight as of this encounter: 104.9 kg (231 lb 3.2 oz). .      "

## 2020-08-18 NOTE — PATIENT INSTRUCTIONS
Go to Clinic D to request schedule to see general surgeon for the pilonidal cyst.    No sign of infection today.  If with fever, swelling or foul-smelling discharge, see a doctor promptly.    Refilled singulair for his allergies.

## 2020-08-19 LAB
LAMOTRIGINE SERPL-MCNC: 10.3 UG/ML (ref 2.5–15)
VALPROATE FREE SERPL-MCNC: 6.7 UG/ML (ref 6–20)

## 2020-08-20 RX ORDER — LAMOTRIGINE 100 MG/1
TABLET ORAL
Qty: 90 TABLET | Refills: 3 | Status: SHIPPED | OUTPATIENT
Start: 2020-08-20 | End: 2020-12-22

## 2020-08-20 NOTE — TELEPHONE ENCOUNTER
Let's increase LTG to 150 mg bid.   Thanks!   Austen       The above orders were received from Dr. Lemus today. This was communicated to the patient's father whom verbalized understaning.

## 2020-09-15 ENCOUNTER — TRANSFERRED RECORDS (OUTPATIENT)
Dept: HEALTH INFORMATION MANAGEMENT | Facility: CLINIC | Age: 25
End: 2020-09-15

## 2020-09-20 ENCOUNTER — HOSPITAL ENCOUNTER (EMERGENCY)
Facility: CLINIC | Age: 25
Discharge: HOME | End: 2020-09-20
Attending: NURSE PRACTITIONER | Admitting: NURSE PRACTITIONER
Payer: MEDICAID

## 2020-09-20 VITALS
DIASTOLIC BLOOD PRESSURE: 77 MMHG | RESPIRATION RATE: 16 BRPM | HEART RATE: 76 BPM | BODY MASS INDEX: 31.54 KG/M2 | WEIGHT: 238 LBS | TEMPERATURE: 97.8 F | SYSTOLIC BLOOD PRESSURE: 133 MMHG | HEIGHT: 73 IN | OXYGEN SATURATION: 97 %

## 2020-09-20 DIAGNOSIS — T63.441A BEE STING REACTION: ICD-10-CM

## 2020-09-20 PROCEDURE — 99213 OFFICE O/P EST LOW 20 MIN: CPT | Mod: Z6 | Performed by: NURSE PRACTITIONER

## 2020-09-20 PROCEDURE — G0463 HOSPITAL OUTPT CLINIC VISIT: HCPCS | Performed by: NURSE PRACTITIONER

## 2020-09-20 ASSESSMENT — ENCOUNTER SYMPTOMS
SHORTNESS OF BREATH: 0
WHEEZING: 0
JOINT SWELLING: 1
EYE DISCHARGE: 0
DIZZINESS: 0
ABDOMINAL PAIN: 0
WOUND: 1
NUMBNESS: 0
CHILLS: 0
COLOR CHANGE: 1
HEADACHES: 0
CHEST TIGHTNESS: 0
WEAKNESS: 0
VOMITING: 0
LIGHT-HEADEDNESS: 0
STRIDOR: 0
FEVER: 0
EYE REDNESS: 0
FATIGUE: 0
COUGH: 0

## 2020-09-20 ASSESSMENT — MIFFLIN-ST. JEOR: SCORE: 2108.44

## 2020-09-20 NOTE — ED AVS SNAPSHOT
[facility] Emergency Department  [address] [address]  Phone:  [phone]  Fax:  [phone]                                    Shola Owens   MRN: [id number]    Department:  [facility] Emergency Department   Date of Visit:  9/20/2020           After Visit Summary Signature Page    I have received my discharge instructions, and my questions have been answered. I have discussed any challenges I see with this plan with the nurse or doctor.    ..........................................................................................................................................  Patient/Patient Representative Signature      ..........................................................................................................................................  Patient Representative Print Name and Relationship to Patient    ..................................................               ................................................  Date                                   Time    ..........................................................................................................................................  Reviewed by Signature/Title    ...................................................              ..............................................  Date                                               Time          22EPIC Rev 08/18

## 2020-09-20 NOTE — ED PROVIDER NOTES
History     Chief Complaint   Patient presents with     Insect Bite     HPI  Shola Owens is a 25 year old male who who presents with his mother for concern of bee sting to the right fifth finger.  Patient was stung sometime the day yesterday.  Presents due to worsening erythema and edema.  Patient is autistic and has difficulty verbalizing his symptoms.  Mother denies fevers or other complaints.  No home interventions such as Benadryl because mother was unsure about drug to drug interactions.     Allergies:  Allergies   Allergen Reactions     Risperidone Hives       Problem List:    Patient Active Problem List    Diagnosis Date Noted     Zygomatic fracture, unspecified, initial encounter for closed fracture 04/18/2016     Priority: Medium     Closed fracture of maxilla (H) 04/18/2016     Priority: Medium     Seizure 04/30/2015     Priority: Medium     Disturbance in sleep behavior 08/31/2006     Priority: Medium     Doing well on Geodon managed by Dr. Sanders.  05/2013:  Geodon 120 mg BID.  Problem list name updated by automated process. Provider to review       Active autistic disorder 06/13/2005     Priority: Medium     Moderate to severe.  On Tenex and Cymbalta for anxiety.  Meds [facility] Dr. Sanders.  [facility]  05/2013: Tenex 1 mg BID.  Started Zyprexa PRN for some increased aggression.  Reflux symptoms have been episodic since 11/2011. Associated with the size of the meals. Portion control is effective.   Problem list name updated by automated process. Provider to review          Past Medical History:    Past Medical History:   Diagnosis Date     Acne      Autistic disorder, current or active state      Mild persistent asthma        Past Surgical History:    History reviewed. No pertinent surgical history.    Family History:    Family History   Problem Relation Age of Onset     Hypertension Father      Hypertension Paternal Grandmother      Thyroid Disease Paternal Grandmother   "       hypo     Allergies Mother         seasonal     Heart Disease Maternal Grandmother         A. Fib     Thyroid Disease Maternal Grandmother         hypo     Cancer Maternal Grandfather         cancerous tumor in his arm     Cancer Paternal Grandfather         passed from lung cancer     Hypertension Maternal Uncle        Social History:  Marital Status:  Single [1]  Social History     Tobacco Use     Smoking status: Never Smoker     Smokeless tobacco: Never Used     Tobacco comment: outside smokers-not in the house   Substance Use Topics     Alcohol use: No     Alcohol/week: 0.0 standard drinks     Drug use: No        Medications:    cetirizine (ZYRTEC) 10 MG tablet  divalproex sodium extended-release (DEPAKOTE ER) 500 MG 24 hr tablet  guanFACINE (TENEX) 1 MG tablet  lamoTRIgine (LAMICTAL) 100 MG tablet  montelukast (SINGULAIR) 10 MG tablet  Multiple Vitamin (MULTIVITAMINS PO)  ziprasidone (GEODON) 40 MG capsule  ziprasidone (GEODON) 80 MG capsule          Review of Systems   Constitutional: Negative for chills, fatigue and fever.   Eyes: Negative for discharge and redness.   Respiratory: Negative for cough, chest tightness, shortness of breath, wheezing and stridor.    Cardiovascular: Negative for chest pain.   Gastrointestinal: Negative for abdominal pain and vomiting.   Musculoskeletal: Positive for joint swelling.   Skin: Positive for color change and wound. Negative for rash.   Neurological: Negative for dizziness, weakness, light-headedness, numbness and headaches.   All other systems reviewed and are negative.      Physical Exam   BP: 133/77  Pulse: 76  Temp: 97.8  F (36.6  C)  Resp: 16  Height: 185.4 cm (6' 1\")  Weight: 108 kg (238 lb)  SpO2: 97 %      Physical Exam  Constitutional:       General: He is not in acute distress.     Appearance: He is well-developed. He is not diaphoretic.   HENT:      Head: Normocephalic.   Eyes:      Conjunctiva/sclera: Conjunctivae normal.      Pupils: Pupils are equal, " round, and reactive to light.   Neck:      Musculoskeletal: Normal range of motion and neck supple.   Cardiovascular:      Rate and Rhythm: Normal rate and regular rhythm.      Pulses: Normal pulses.   Pulmonary:      Effort: Pulmonary effort is normal. No respiratory distress.      Breath sounds: Normal breath sounds and air entry. No decreased air movement. No decreased breath sounds, wheezing or rhonchi.   Abdominal:      General: There is no distension.      Palpations: Abdomen is soft.      Tenderness: There is no abdominal tenderness.   Musculoskeletal: Normal range of motion.   Skin:     General: Skin is warm.      Capillary Refill: Capillary refill takes less than 2 seconds.      Comments: Erythema noted to the medial aspect of the right 5th finger. There appears to be two areas that may be the puncture from the bee sting. There is diffuse, scant edema to the finger. Erythema continues just proximal to the MCP joint. Patient doesn't express any pain to manipulation of the right hand. Perfusion equal bilaterally.    Neurological:      General: No focal deficit present.      Mental Status: He is alert and oriented to person, place, and time.   Psychiatric:         Mood and Affect: Mood normal.       ED Course        Procedures    No results found for this or any previous visit (from the past 24 hour(s)).    Medications - No data to display    Assessments & Plan (with Medical Decision Making)   Shola Owens is a 25 year old male who who presents with his mother for concern of bee sting to the right fifth finger.  Patient was stung sometime the day yesterday.  Presents due to worsening erythema and edema.  Patient is autistic and has difficulty verbalizing his symptoms.  Mother denies fevers or other complaints.  No home interventions such as Benadryl because mother was unsure about drug to drug interactions. Exam as above. Discussed with mother that this is most likely a continued response to the sting that it  is a skin infection. There are no contradiction of taking Benadryl with his current medications. Discussed home symptom management along with worrisome reasons to seek reevaluation. Mother is agreeable to plan of care, all questions answered. Patient is discharged in no acute distress.   I have reviewed the nursing notes.    I have reviewed the findings, diagnosis, plan and need for follow up with the patient.  Discharge Medication List as of 9/20/2020  5:48 PM          Final diagnoses:   Bee sting reaction     9/20/2020   [facility] EMERGENCY DEPARTMENT     Radha Muhammad, APRN CNP  09/20/20 [id number]

## 2020-10-28 ENCOUNTER — TELEPHONE (OUTPATIENT)
Dept: NEUROLOGY | Facility: CLINIC | Age: 25
End: 2020-10-28

## 2020-10-28 DIAGNOSIS — G40.909 SEIZURE DISORDER (H): Primary | ICD-10-CM

## 2020-10-28 NOTE — TELEPHONE ENCOUNTER
What is the concern that needs to be addressed by a nurse? Patient had a seizure and dad would like a call back.    May a detailed message be left on voicemail?     Date of last office visit: 11/14/2019    Message routed to:[facility] RN POOL

## 2020-11-03 NOTE — TELEPHONE ENCOUNTER
Spoke with Willis (father)  Shola had a seizure last week, it lasted about 8 seconds, head to right, eyes closed, made a seizure like noise.  Father did not need to lower Shola to the ground for this seizure.  Shola was disoriented for about 30 seconds afterwards.  This was a typical seizure, nothing unusual.    No missed or late medications  No change in the appearance of the medications    No concerns of illness - no GI upset, no fever, no respiratory issues.  No concerns COVID-19 exposure.    Current meds:-  Lamotrigine 150mg bid (last increased 8/20/2020)  Depakote 500-1000    Results for SHOLA RODRIGUEZ (MRN [id number]) as of 11/3/2020 11:19   Ref. Range 8/18/2020 09:46   Lamotrigine Level   (dose 250/day) Latest Ref Range: 2.5 - 15.0 ug/mL 10.3   Valproic Acid Level Latest Ref Range: 50 - 100 mg/L 78   Valproic Acid Free Latest Ref Range: 6.0 - 20.0 ug/mL 6.7       Will discuss with MD for recommendations.  Will ask scheduling to contact Willis to set up an appointment with

## 2020-11-18 NOTE — TELEPHONE ENCOUNTER
"Message returned by     \"  Austen Lemus MD Hamley, David, RN   Caller: Unspecified              I should see him soon.  I do not see an appointment in [facility]. Please check his levels of Lamictal and Depakote before the visit.   No other recommendations.   Thanks! Austen      \"    Call placed to Willis, message left.  Message included that Shola needs to have a follow up with , and to call [phone] to arrange this.  Additionally that labs would be ordered and they should be performed before the visit. If they are in the [employer] system the orders are available, if at a different system we should be contacted with where the labs need to be sent to.  Clinic contact phone number left on message  "

## 2020-11-23 DIAGNOSIS — G40.909 SEIZURE DISORDER (H): ICD-10-CM

## 2020-11-25 RX ORDER — DIVALPROEX SODIUM 500 MG/1
TABLET, EXTENDED RELEASE ORAL
Qty: 90 TABLET | Refills: 0 | Status: SHIPPED | OUTPATIENT
Start: 2020-11-25 | End: 2020-12-22

## 2020-11-25 NOTE — TELEPHONE ENCOUNTER
Pending Prescriptions:                       Disp   Refills    divalproex sodium extended-release (DEPAK*90 tab*11           Sig: TAKE ONE TABLET BY MOUTH EVERY MORNING AND TAKE           TWO TABLETS EVERY EVENING.        Last Written Prescription Date:  11/14/2019  Last Fill Quantity: 90,   # refills: 11  Last Office Visit : 11/14/2019  Future Office visit:  12/17/2020    Medication request meets [place] Medication Refill Protocol - Seizure Medications (non-controlled) requirements for a one time refill. Patient is overdue for an appointment. Patient is scheduled for 12/17/2020.

## 2020-12-18 DIAGNOSIS — G40.909 SEIZURE DISORDER (H): ICD-10-CM

## 2020-12-22 RX ORDER — LAMOTRIGINE 100 MG/1
TABLET ORAL
Qty: 90 TABLET | Refills: 0 | Status: SHIPPED | OUTPATIENT
Start: 2020-12-22 | End: 2021-01-21

## 2020-12-22 RX ORDER — DIVALPROEX SODIUM 500 MG/1
TABLET, EXTENDED RELEASE ORAL
Qty: 90 TABLET | Refills: 0 | Status: SHIPPED | OUTPATIENT
Start: 2020-12-22 | End: 2021-01-21

## 2021-01-14 ENCOUNTER — HOSPITAL ENCOUNTER (EMERGENCY)
Facility: CLINIC | Age: 26
Discharge: HOME | End: 2021-01-14
Attending: NURSE PRACTITIONER | Admitting: NURSE PRACTITIONER
Payer: MEDICAID

## 2021-01-14 VITALS
DIASTOLIC BLOOD PRESSURE: 74 MMHG | OXYGEN SATURATION: 99 % | TEMPERATURE: 98 F | SYSTOLIC BLOOD PRESSURE: 126 MMHG | HEART RATE: 70 BPM

## 2021-01-14 DIAGNOSIS — L03.316 CELLULITIS, UMBILICAL: ICD-10-CM

## 2021-01-14 PROCEDURE — G0463 HOSPITAL OUTPT CLINIC VISIT: HCPCS | Performed by: NURSE PRACTITIONER

## 2021-01-14 PROCEDURE — 99213 OFFICE O/P EST LOW 20 MIN: CPT | Performed by: NURSE PRACTITIONER

## 2021-01-14 PROCEDURE — 87070 CULTURE OTHR SPECIMN AEROBIC: CPT | Performed by: NURSE PRACTITIONER

## 2021-01-14 RX ORDER — DOXYCYCLINE 100 MG/1
100 CAPSULE ORAL 2 TIMES DAILY
Qty: 20 CAPSULE | Refills: 0 | Status: SHIPPED | OUTPATIENT
Start: 2021-01-14 | End: 2021-01-21

## 2021-01-14 NOTE — DISCHARGE INSTRUCTIONS
Start the doxycycline and take 1 tablet by mouth twice daily for 10 days.  You should take this with food but no products containing calcium within 1 hour before or after taking this medication.  Follow-up with primary care on Monday for a wound check and to discuss results of wound culture.  Return to the emergency room if you should develop fever of 101.5 or greater or loss of appetite or worsening symptoms.  It takes approximately 48 hours for the antibiotic to start working.  Wash the wound morning and evening with water and Hibiclens followed by application of bacitracin.  Cover with a 4 x 4 to prevent scratching.  You may stop this regimen when the wound is no longer secreting drainage.  You also may stop this when the patient stops itching.

## 2021-01-14 NOTE — ED AVS SNAPSHOT
[facility]  [address] [address]  Phone: [phone]  Fax: [phone]                                    Shola Owens   MRN: [id number]    Department: [facility]   Date of Visit: 1/14/2021           After Visit Summary Signature Page    I have received my discharge instructions, and my questions have been answered. I have discussed any challenges I see with this plan with the nurse or doctor.    ..........................................................................................................................................  Patient/Patient Representative Signature      ..........................................................................................................................................  Patient Representative Print Name and Relationship to Patient    ..................................................               ................................................  Date                                   Time    ..........................................................................................................................................  Reviewed by Signature/Title    ...................................................              ..............................................  Date                                               Time          22EPIC Rev 08/18

## 2021-01-14 NOTE — ED PROVIDER NOTES
History     Chief Complaint   Patient presents with     Wound Infection     wound to umbilicus     HPI  Shola Owens is a 25 year old male with seizure disorder and severe autism who presents to urgent care with an umbilical injury versus infection.  Stepfather presents with patient today and is legal guardian with biological mother.  Stepfather provides HPI.  Stepfather reports today as he was assisting his son with bathing he noted bloody drainage from the umbilical region with associative purulent drainage.  He reports that he discussed this with patient Shola and Shola states that he thought that it has been scratchy and itchy for the past few days.  Stepfather reports that when he bathed him on Saturday it was not present at that time and he denies any trauma since then.    Patient denies fever, aches, chills, sweats, ear pain, eye pain, throat pain, chest pain, cough, wheezing, shortness of breath, abdominal pain, nausea, vomiting, diarrhea, dysuria, speech difficulty, mental confusion, thoughts of harming self per step-father.    Allergies:  Allergies   Allergen Reactions     Risperidone Hives       Problem List:    Patient Active Problem List    Diagnosis Date Noted     Zygomatic fracture, unspecified, initial encounter for closed fracture 04/18/2016     Priority: Medium     Closed fracture of maxilla (H) 04/18/2016     Priority: Medium     Seizure 04/30/2015     Priority: Medium     Disturbance in sleep behavior 08/31/2006     Priority: Medium     Doing well on Geodon managed by Dr. Sanders.  05/2013:  Geodon 120 mg BID.  Problem list name updated by automated process. Provider to review       Active autistic disorder 06/13/2005     Priority: Medium     Moderate to severe.  On Tenex and Cymbalta for anxiety.  Meds managed by Dr. Sanders.  [facility] [place].  05/2013: Tenex 1 mg BID.  Started Zyprexa PRN for some increased aggression.  Reflux symptoms have been episodic since 11/2011.  Associated with the size of the meals. Portion control is effective.   Problem list name updated by automated process. Provider to review          Past Medical History:    Past Medical History:   Diagnosis Date     Acne      Autistic disorder, current or active state      Mild persistent asthma        Past Surgical History:    No past surgical history on file.    Family History:    Family History   Problem Relation Age of Onset     Hypertension Father      Hypertension Paternal Grandmother      Thyroid Disease Paternal Grandmother         hypo     Allergies Mother         seasonal     Heart Disease Maternal Grandmother         A. Fib     Thyroid Disease Maternal Grandmother         hypo     Cancer Maternal Grandfather         cancerous tumor in his arm     Cancer Paternal Grandfather         passed from lung cancer     Hypertension Maternal Uncle        Social History:  Marital Status:  Single [1]  Social History     Tobacco Use     Smoking status: Never Smoker     Smokeless tobacco: Never Used     Tobacco comment: outside smokers-not in the house   Substance Use Topics     Alcohol use: No     Alcohol/week: 0.0 standard drinks     Drug use: No        Medications:         doxycycline hyclate (VIBRAMYCIN) 100 MG capsule       cetirizine (ZYRTEC) 10 MG tablet       divalproex sodium extended-release (DEPAKOTE ER) 500 MG 24 hr tablet       guanFACINE (TENEX) 1 MG tablet       lamoTRIgine (LAMICTAL) 100 MG tablet       montelukast (SINGULAIR) 10 MG tablet       Multiple Vitamin (MULTIVITAMINS PO)       ziprasidone (GEODON) 40 MG capsule       ziprasidone (GEODON) 80 MG capsule      Review of Systems  As mentioned above in the history present illness. All other systems were reviewed and are negative.    Physical Exam   BP: 126/74  Pulse: 70  Temp: 98  F (36.7  C)  SpO2: 99 %      Physical Exam  /74   Pulse 70   Temp 98  F (36.7  C) (Temporal)   SpO2 99%   General: alert, oriented,  cooperative  Head: atraumatic,  normocephalic  Nose: no rhinorrhea or epistaxis  Ears: no external auditory canal discharge or bleeding.    Eyes: Sclera nonicteric. Conjunctiva noninjected. EOMI  Mouth: no tonsillar erythema, edema, or exudate; uvula midline  Neck: supple, soft without tracheal deviation, no lymphadenopathy  Lungs: Respirations even, non labored, lung sounds clear throughout all fields  CV: Heart rate regular, S1/S2 without murmurs or gallops noted; peripheral pulses palpable and symmetric  Abdomen: soft, non-tender, non-distended, no guarding or rebound. Positive bowel sounds  Examination of the umbilicus reveals excoriations at the rim of the umbilicus and bloody pustular drainage within the umbilicus.  This area was cleansed and there is mild surrounding erythema with poorly demarcated lines without fluctuant masses.  Wound culture obtained  Extremities: no cyanosis or edema  Skin: no rash or diaphoresis  Neuro: Pt awake, alert and oriented times 3, speech is fluent, gait is normal  Psychiatric: affect/mood normal, cooperative, normal judgement/insight and memory intact        ED Course        Procedures      Results for orders placed or performed during the hospital encounter of 01/14/21 (from the past 24 hour(s))   Wound Culture Aerobic Bacterial    Specimen: Umbilical; Wound    Umbilicus   Result Value Ref Range    Specimen Description Wound Umbilicus     Special Requests Specimen collected in eSwab transport (white cap)     Culture Micro PENDING        Medications - No data to display    Assessments & Plan (with Medical Decision Making)  Shola Owens is a 25 year old male with seizure disorder and severe autism who presents to urgent care with an umbilical injury versus infection.  On exam patient is pleasant and alert and orientated and vitally stable.  Examination of the umbilicus reveals excoriations at the rim of the umbilicus and bloody pustular drainage within the umbilicus.  This area was cleansed and there is mild  surrounding erythema with poorly demarcated lines without fluctuant masses.  Differential diagnosis cellulitis with or without abscess, omphalitis which is unlikely as it would be more systemic.  Will treat as cellulitis without abscess discussed wound care and follow-up care with stepfather.  Discussed worrisome reasons with which to return.  Patient placed on doxycycline twice daily for 10 days.  Stepfather verbalized understanding and discharged in stable condition.     I have reviewed the nursing notes.    I have reviewed the findings, diagnosis, plan and need for follow up with the patient.    Discharge Medication List as of 1/14/2021  1:37 PM      START taking these medications    Details   doxycycline hyclate (VIBRAMYCIN) 100 MG capsule Take 1 capsule (100 mg) by mouth 2 times daily for 10 days, Disp-20 capsule, R-0, E-Prescribe             Final diagnoses:   Cellulitis, umbilical       1/14/2021   [facility] EMERGENCY DEPT     Lexi Oviedo, DEYSI CNP  01/14/21 4661

## 2021-01-16 LAB
BACTERIA SPEC CULT: NORMAL
Lab: NORMAL
SPECIMEN SOURCE: NORMAL

## 2021-01-19 ENCOUNTER — OFFICE VISIT (OUTPATIENT)
Dept: FAMILY MEDICINE | Facility: CLINIC | Age: 26
End: 2021-01-19
Payer: MEDICAID

## 2021-01-19 VITALS
BODY MASS INDEX: 31.45 KG/M2 | HEIGHT: 72 IN | SYSTOLIC BLOOD PRESSURE: 120 MMHG | HEART RATE: 68 BPM | DIASTOLIC BLOOD PRESSURE: 76 MMHG | TEMPERATURE: 97.3 F | RESPIRATION RATE: 16 BRPM | WEIGHT: 232.2 LBS

## 2021-01-19 DIAGNOSIS — Z23 NEED FOR PROPHYLACTIC VACCINATION AND INOCULATION AGAINST INFLUENZA: ICD-10-CM

## 2021-01-19 DIAGNOSIS — L03.316 CELLULITIS, UMBILICAL: Primary | ICD-10-CM

## 2021-01-19 DIAGNOSIS — G40.909 SEIZURE DISORDER (H): ICD-10-CM

## 2021-01-19 LAB — VALPROATE SERPL-MCNC: 76 MG/L (ref 50–100)

## 2021-01-19 PROCEDURE — 80164 ASSAY DIPROPYLACETIC ACD TOT: CPT | Performed by: PSYCHIATRY & NEUROLOGY

## 2021-01-19 PROCEDURE — 99213 OFFICE O/P EST LOW 20 MIN: CPT | Mod: 25 | Performed by: INTERNAL MEDICINE

## 2021-01-19 PROCEDURE — 36415 COLL VENOUS BLD VENIPUNCTURE: CPT | Performed by: PSYCHIATRY & NEUROLOGY

## 2021-01-19 PROCEDURE — 90471 IMMUNIZATION ADMIN: CPT | Performed by: INTERNAL MEDICINE

## 2021-01-19 PROCEDURE — 90686 IIV4 VACC NO PRSV 0.5 ML IM: CPT | Performed by: INTERNAL MEDICINE

## 2021-01-19 ASSESSMENT — MIFFLIN-ST. JEOR: SCORE: 2062.28

## 2021-01-19 ASSESSMENT — PAIN SCALES - GENERAL: PAINLEVEL: NO PAIN (0)

## 2021-01-19 NOTE — PROGRESS NOTES
"  Assessment & Plan     Cellulitis, umbilical    Much improved on doxycycline, complete course as planned.    Seizure disorder (H)    Labs from neurology released    - Lamotrigine Level  - Valproic acid free  - Valproic acid    Need for prophylactic vaccination and inoculation against influenza    - INFLUENZA VACCINE IM > 6 MONTHS VALENT IIV4 [39396]       L[place] Mitchell MD  [facility]    Yvon Jolley is a 25 year old who presents to clinic today for the following health issues  accompanied by his mother:    VANDANA Jolley was seen in the ED on 1/14 with purulent drainage from the umbilicus and was started on doxycycline.  Culture grew only normal skin tank.      ED/UC Followup:    Facility:  [facility] Emergency Department  Date of visit: 1/14/2021  Reason for visit: cellulitis, umbilical  Current Status: improving- no further drainage, no fevers/chills       Needs to have labs drawn for Dr. Lemus. Future orders have been placed.    Also requesting to get flu shot today.         Review of Systems   Constitutional, derm systems are negative, except as otherwise noted.      Objective    /76 (BP Location: Left arm, Patient Position: Sitting, Cuff Size: Adult Large)   Pulse 68   Temp 97.3  F (36.3  C) (Tympanic)   Resp 16   Ht 1.822 m (5' 11.75\")   Wt 105.3 kg (232 lb 3.2 oz)   BMI 31.71 kg/m    Body mass index is 31.71 kg/m .  Physical Exam   GENERAL: healthy, alert and no distress  SKIN: normal appearing umbilicus with no discharge, redness, or pain to palpation            "

## 2021-01-20 LAB — VALPROATE FREE SERPL-MCNC: 6 UG/ML (ref 6–20)

## 2021-01-21 ENCOUNTER — VIRTUAL VISIT (OUTPATIENT)
Dept: NEUROLOGY | Facility: CLINIC | Age: 26
End: 2021-01-21
Payer: MEDICAID

## 2021-01-21 DIAGNOSIS — G40.909 SEIZURE DISORDER (H): ICD-10-CM

## 2021-01-21 LAB — LAMOTRIGINE SERPL-MCNC: 11.7 UG/ML (ref 2.5–15)

## 2021-01-21 RX ORDER — DIVALPROEX SODIUM 500 MG/1
1000 TABLET, EXTENDED RELEASE ORAL 2 TIMES DAILY
Qty: 360 TABLET | Refills: 3 | Status: SHIPPED | OUTPATIENT
Start: 2021-01-21 | End: 2021-06-24

## 2021-01-21 RX ORDER — LAMOTRIGINE 100 MG/1
TABLET ORAL
Qty: 270 TABLET | Refills: 3 | Status: SHIPPED | OUTPATIENT
Start: 2021-01-21 | End: 2021-03-10

## 2021-01-21 NOTE — LETTER
2021       RE: Shola Owens  : 1995   MRN: [id number]      Dear Colleague,    Thank you for referring your patient, Shola Owens, to the [facility] at [facility]. Please see a copy of my visit note below.    Shola is a 25 year old who is being evaluated via a billable video visit.      How would you like to obtain your AVS? Mail a copy  If the video visit is dropped, the invitation should be resent by: Text to cell phone: [phone]  Will anyone else be joining your video visit? Yes: Willis. How would they like to receive their invitation? Text to cell phone: [phone]      Video Start Time: 10:03 AM    Video-Visit Details    Type of service:  Video Visit    Video End Time:10:27 AM    Originating Location (pt. Location): Home    Distant Location (provider location):  [facility]LE[facility]     Platform used for Video Visit: Sentrigo       CHIEF COMPLAINT: Seizures.     HISTORY OF PRESENT ILLNESS: Video call for follow up for seizures. His father was also on the conference call. This patient is a 26-year-old, right-handed male with a history of autistic spectrum disorder.   The patient himself cannot provide much history, and the majority of the history is provided by his father.  Sine the last visit over one year ago, he had 2 seizures, one was 2 months ago and another was about one year ago, both were probably CPS. He is currently taking Lamotrigine 150 mg bid and Depakote 500 - 1000.    Patient had seizure onset in 2015, another seizure 10 days after that,  both were described as GTCs.  He was initially started on keppra.  It was stopped because of GI side effects.  Then he was started on Lamotrigine, Depakote was added later.  He is currently on Lamictal 100mg bid and Depakote 500-1000.  He had behavioral problems when he was on Lamictal 150 mg bid.  His behavior has been really good for the past 2 years.  He had a seizure on 2016 that  caused fractures of the right zygomatic arch, maxillary sinus and orbit.        According to the mother, on 04/30/2015, the patient was found at home on the kitchen floor. He was cyanotic. He had foaming at the mouth. He had some bleeding from the nose. He was unresponsive, and he had some tonic-clonic movement of his extremities. The clonic-tonic movement lasted for about 1 minute. The mother did not see the beginning of the event. There was no loss of bowel or bladder control. The patient did not bite his tongue. Afterwards the patient was very lethargic, still unresponsive until he was in the ambulance; then he was able to recognize his father and was able to communicate a little bit.    The patient had no prior history of seizures. He has been taking Geodon for 7 years. No recent medication changes. No recent illness. When EMS arrived at the scene, the blood sugar was found to be 81.      TRIGGERS FOR SEIZURES: Unclear.    RISK FACTORS FOR SEIZURES: He has a history of autistic spectrum disorder. No history of head trauma with loss of consciousness. No history of CNS infection. No history of febrile convulsions.        Current Outpatient Medications   Medication Sig Dispense Refill     divalproex sodium extended-release (DEPAKOTE ER) 500 MG 24 hr tablet TAKE ONE TABLET BY MOUTH EVERY MORNING AND TWO TABLETS EVERY EVENING 90 tablet 0     lamoTRIgine (LAMICTAL) 100 MG tablet Take 1.5 tablets (150 mg) by mouth twice a day. 90 tablet 0     Multiple Vitamin (MULTIVITAMINS PO)        ziprasidone (GEODON) 40 MG capsule Take 1 capsule (40 mg) by mouth 2 times daily (with meals) AM 60 capsule 0     ziprasidone (GEODON) 80 MG capsule Take 1 capsule (80 mg) by mouth 2 times daily (with meals) AM AND PM 60 capsule 0     cetirizine (ZYRTEC) 10 MG tablet Take 1 tablet (10 mg) by mouth 2 times daily as needed for allergies (Patient not taking: Reported on 1/19/2021) 100 tablet 3     doxycycline hyclate (VIBRAMYCIN) 100 MG  capsule Take 1 capsule (100 mg) by mouth 2 times daily for 10 days (Patient not taking: Reported on 1/21/2021) 20 capsule 0     guanFACINE (TENEX) 1 MG tablet Take 1 tablet (1 mg) by mouth 2 times daily (Patient not taking: Reported on 1/21/2021) 60 tablet 0     montelukast (SINGULAIR) 10 MG tablet Take 1 tablet (10 mg) by mouth At Bedtime (Patient not taking: Reported on 1/19/2021) 20 tablet 3         PAST MEDICAL HISTORY: Autistic disorder, sleep disturbances.      FAMILY HISTORY: No family history of seizures. Grandmother had a history of migraine headaches. Mother had a history of anxiety and panic attacks. Father had a history of chemical dependency.    SOCIAL HISTORY: He is living with his parents. He had special education. He is single. No alcohol, no drug abuse, no smoking. He is not working.     PHYSICAL EXAMINATION:     Alert, oriented to name and place.      REVIEW OF SYSTEMS: 8 point review of systems is negative.     PREVIOUS DIAGNOSTIC TESTING: MRI of the brain on 04/30 showed a negative study. EEG on 05/01 showed a normal study.      IMPRESSION:    1. New-onset seizure.    The patient himself cannot provide much history, and the majority of the history is provided by his father.  Sine the last visit over one year ago, he had 2 seizures, one was 2 months ago and another was about one year ago, both were probably CPS. He is currently taking Lamotrigine 150 mg bid and Depakote 500 - 1000.     2. Autistic disorder.    3. Sleep disturbances.      PLAN:    1. Continue Lamotrigine 150 mg bid.  2. Increase Depakote 1000 - 1000.  3. Check Labs in 2 months.  4. Return to clinic in 3 months. Other options include Vimpat, [facility], etc.    32 min total was spent on the visit.       24 min was spent on face to face time  4 min was spent on preparation to review charts and labs  4 min was spent on documentation of clinical information    Sincerely,    Austen Lemus MD

## 2021-01-21 NOTE — PATIENT INSTRUCTIONS
Times of Days  am pm        Medication Tablet Size Number of Tablets/Capsules Total Daily Dosage    Lamictal  100  1 1        200 mg    Depakote  500  2 2        2000 mg                                                                                                                 Carry this with you at all times.  CONTINUE TAKING YOUR OTHER MEDICATIONS AS PREVIOUSLY DIRECTED.      * * *Do not store medications in the bathroom.  Keep medications away from children!* * *

## 2021-01-21 NOTE — PROGRESS NOTES
Shola is a 25 year old who is being evaluated via a billable video visit.      How would you like to obtain your AVS? Mail a copy  If the video visit is dropped, the invitation should be resent by: Text to cell phone: [phone]  Will anyone else be joining your video visit? Yes: Willis. How would they like to receive their invitation? Text to cell phone: [phone]      Video Start Time: 10:03 AM    Video-Visit Details    Type of service:  Video Visit    Video End Time:10:27 AM    Originating Location (pt. Location): Home    Distant Location (provider location):  [facility]LE[facility]     Platform used for Video Visit: Su

## 2021-02-01 ENCOUNTER — TELEPHONE (OUTPATIENT)
Dept: NEUROLOGY | Facility: CLINIC | Age: 26
End: 2021-02-01

## 2021-02-01 NOTE — TELEPHONE ENCOUNTER
"Last visit with  1/21/21 at which time the VPA was increased    Most recent labs (pre-increase in VPA)  Results for SHOLA RODRIGUEZ (MRN [id number]) as of 2/1/2021 11:40   Ref. Range 1/19/2021 15:11   Lamotrigine Level Latest Ref Range: 2.5 - 15.0 ug/mL 11.7   Valproic Acid Level Latest Ref Range: 50 - 100 mg/L 76   Valproic Acid Free Latest Ref Range: 6.0 - 20.0 ug/mL 6.0     Current AEDs  AED - ANTIEPILEPTIC DRUGS 1/21/2021   lamoTRIgine (Oral) Take 1.5 tablets (150 mg) by mouth twice a day. (100 MG TABS)   divalproex sodium extended-release (Oral) 1,000 mg BID       Patient was with his father.  He describes it as one of Shola's typical seizures.    Lasted about 12-15 seconds  looked to the side, then eyes rolled back, and he made a \"seizure noise\"  4-5 mins to recover to baseline.  Currently doing well    No missed or late meds  No side effects from recent increase in VPA dose  No new medications or other changes   No signs of illness or infection  Sleep has been as usual.    No clear precipitating factors that father can identify    Will discuss with MD and call parents back  "

## 2021-02-01 NOTE — TELEPHONE ENCOUNTER
What is the concern that needs to be addressed by a nurse? Pt just had a seizure an hour ago. Mother was told to call, pt had meds increased at last appt. Please call back when available.     May a detailed message be left on voicemail? yes    Date of last office visit: 1/21/21    Message routed to: mincep rn pool

## 2021-02-02 NOTE — TELEPHONE ENCOUNTER
Call placed to mother.  Multiple attempts made, , the person answering was unable to hear me.  Call attempted to father - call went straight to voice messaging.

## 2021-02-03 NOTE — TELEPHONE ENCOUNTER
Call placed.  Discussed with mother.  I encouraged them to call if any further seizures happened.    No other questions a this time

## 2021-02-03 NOTE — TELEPHONE ENCOUNTER
Call placed, message left with call back number   [[email or web address]] : 1.  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 Due to insurance issues he stopped it but now is back on it since 4/4/2017\par - repeat MRI brain 8/2018 radiation related changes, post treatment changes, he is asymptomatic,  and repeat PET/CT 6/5/18   is without systemic progression, it confirmed radiation necrosis. Recent PET/CT on  9/28/18 that was PRESLEY. CT abd was unchanged. I think the findings on MR brain are radiation necrosis given his symptoms as before. Recent MR brain showed more edema but he is doing well. CT chest was PRESLEY. Thus MR brain findings most likely due to  radiation necrosis that re causing his headaches. \par \par 2.   Intermittent headaches  continues to  dexamethasone, this is due to  radiation necrosis\par \par 3. Elevated total bilirubin likely secondary to alectinib.\par - will monitor, its stable\par \par 4.  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 LUTs symptoms better PSA was normal  \par \par Plan:\par - C/w Alectinib.  \par - CTs and MR brain are stable\par -will stop avastin he is much better \par -RTC in one month\par -scans in 2-3 months if no new issues \par \par \par RTC in 4 weeks  [Palliative] : Goals of care discussed with patient: Palliative

## 2021-03-09 ENCOUNTER — TELEPHONE (OUTPATIENT)
Dept: NEUROLOGY | Facility: CLINIC | Age: 26
End: 2021-03-09

## 2021-03-09 NOTE — TELEPHONE ENCOUNTER
What is the concern that needs to be addressed by a nurse? Pt had another seizure on Friday 3/5/21. Please call mother back when available.     May a detailed message be left on voicemail? yes    Date of last office visit: 1/21/21    Message routed to: mincep rn pool

## 2021-03-09 NOTE — TELEPHONE ENCOUNTER
Patient's mother calls the office with report of another seizure. This time it was partially unwitnessed; his sister noted he had a distorted facial expression, went to get her parents and when the parent responded to the patient they found him on the floor, fall with injury (nose lac), no convulsion, able to speak but out of it.       Since this is the second call regarding seizure reoccurrence since the last visit, a virtual appointment was scheduled for tomorrow with Dr. Lemus.

## 2021-03-10 ENCOUNTER — VIRTUAL VISIT (OUTPATIENT)
Dept: NEUROLOGY | Facility: CLINIC | Age: 26
End: 2021-03-10
Payer: MEDICAID

## 2021-03-10 DIAGNOSIS — G40.909 SEIZURE DISORDER (H): ICD-10-CM

## 2021-03-10 PROCEDURE — 99214 OFFICE O/P EST MOD 30 MIN: CPT | Mod: GT | Performed by: PSYCHIATRY & NEUROLOGY

## 2021-03-10 RX ORDER — LAMOTRIGINE 100 MG/1
TABLET ORAL
Qty: 315 TABLET | Refills: 3 | Status: SHIPPED | OUTPATIENT
Start: 2021-03-10 | End: 2021-06-24

## 2021-03-10 NOTE — PATIENT INSTRUCTIONS
Times of Days  am pm        Medication Tablet Size Number of Tablets/Capsules Total Daily Dosage    Lamictal  100  2 1.5        200 mg    Depakote  500  2 2        2000 mg                                                                                                                 Carry this with you at all times.  CONTINUE TAKING YOUR OTHER MEDICATIONS AS PREVIOUSLY DIRECTED.      * * *Do not store medications in the bathroom.  Keep medications away from children!* * *

## 2021-03-10 NOTE — LETTER
3/10/2021       RE: Shola Owens  [address] [facility] [address]     Dear Colleague,    Thank you for referring your patient, Shola Owens, to the [facility] NEUROLOG[facility] CL[facility]IC [place] at [facility]. Please see a copy of my visit note below.    Shola is a 25 year old who is being evaluated via a billable video visit.      How would you like to obtain your AVS? Mail a copy  If the video visit is dropped, the invitation should be resent by: Text to cell phone: [phone]  Will anyone else be joining your video visit? No    Video-Visit Details    Type of service:  Video Visit    Video Start Time: 12:50 PM  Video End Time:1:13 PM    Originating Location (pt. Location): Home    Distant Location (provider location):  [facility] NEUROLOGY [facility]     Platform used for Video Visit: [employer] [phone]    Chief Complaint   Patient presents with     RECHECK     VIDEO VISIT RETURN      Eliu Li        CHIEF COMPLAINT: Seizures.     HISTORY OF PRESENT ILLNESS: Video call for follow up for seizures. His father was also on the conference call. This patient is a 25-year-old, right-handed male with a history of autistic spectrum disorder.   The patient himself cannot provide much history, and the majority of the history is provided by his father.  Since the last visit about one and half months ago, he had another seizure 5 days ago, which was partially witness, likely CPS. The seizures before that were in Jan 2021 and Dec. 2020, both were probably CPS. He is currently taking Lamotrigine 150 mg bid and Depakote 1000 - 1000.  No missing meds.      Patient had seizure onset in April 2015, another seizure 10 days after that,  both were described as GTCs.  He was initially started on keppra.  It was stopped because of GI side effects.  Then he was started on Lamotrigine, Depakote was added later.  He is currently on Lamictal 100mg bid and Depakote  500-1000.  He had behavioral problems when he was on Lamictal 150 mg bid.  His behavior has been really good for the past 2 years.  He had a seizure on 4/12/2016 that caused fractures of the right zygomatic arch, maxillary sinus and orbit.        According to the mother, on 04/30/2015, the patient was found at home on the kitchen floor. He was cyanotic. He had foaming at the mouth. He had some bleeding from the nose. He was unresponsive, and he had some tonic-clonic movement of his extremities. The clonic-tonic movement lasted for about 1 minute. The mother did not see the beginning of the event. There was no loss of bowel or bladder control. The patient did not bite his tongue. Afterwards the patient was very lethargic, still unresponsive until he was in the ambulance; then he was able to recognize his father and was able to communicate a little bit.    The patient had no prior history of seizures. He has been taking Geodon for 7 years. No recent medication changes. No recent illness. When EMS arrived at the scene, the blood sugar was found to be 81.      TRIGGERS FOR SEIZURES: Unclear.    RISK FACTORS FOR SEIZURES: He has a history of autistic spectrum disorder. No history of head trauma with loss of consciousness. No history of CNS infection. No history of febrile convulsions.      Current Outpatient Medications   Medication Sig Dispense Refill     divalproex sodium extended-release (DEPAKOTE ER) 500 MG 24 hr tablet Take 2 tablets (1,000 mg) by mouth 2 times daily 360 tablet 3     lamoTRIgine (LAMICTAL) 100 MG tablet Take 1.5 tablets (150 mg) by mouth twice a day. 270 tablet 3     montelukast (SINGULAIR) 10 MG tablet Take 1 tablet (10 mg) by mouth At Bedtime (Patient not taking: Reported on 1/19/2021) 20 tablet 3     Multiple Vitamin (MULTIVITAMINS PO)        ziprasidone (GEODON) 40 MG capsule Take 1 capsule (40 mg) by mouth 2 times daily (with meals) AM 60 capsule 0     ziprasidone (GEODON) 80 MG capsule Take 1  capsule (80 mg) by mouth 2 times daily (with meals) AM AND PM 60 capsule 0         PAST MEDICAL HISTORY: Autistic disorder, sleep disturbances.       FAMILY HISTORY: No family history of seizures. Grandmother had a history of migraine headaches. Mother had a history of anxiety and panic attacks. Father had a history of chemical dependency.    SOCIAL HISTORY: He is living with his parents. He had special education. He is single. No alcohol, no drug abuse, no smoking. He is not working.     PHYSICAL EXAMINATION:      Alert, oriented to name and place. No facial weakness.     REVIEW OF SYSTEMS: 8 point review of systems is negative.     PREVIOUS DIAGNOSTIC TESTING: MRI of the brain on 04/30 showed a negative study. EEG on 05/01 showed a normal study.      IMPRESSION:    1. New-onset seizure.    Since the last visit about one and half months ago, he had another seizure 5 days ago, which was partially witness, likely CPS. The seizures before that were in Jan 2021 and Dec. 2020, both were probably CPS. He is currently taking Lamotrigine 150 mg bid and Depakote 1000 - 1000.  No missing meds.      2. Autistic disorder.    3. Sleep disturbances.      PLAN:    1. Increase to Lamotrigine 200-150.  2. Continue Depakote 1000 mg bid.  3. Check Labs Lamictal VPA, AST, Plt.  4. Return to clinic in 2 months. Other options include Vimpat, OXC, etc.    33 min total time was spent on this visit.      23 min was spent on face to face time  5 min was spent on preparation of visit to review charts and labs, ordering medications and tests  5 min was spent on documentation of clinical information    Again, thank you for allowing me to participate in the care of your patient.      Sincerely,    Austen Lemus MD

## 2021-03-10 NOTE — PROGRESS NOTES
CHIEF COMPLAINT: Seizures.     HISTORY OF PRESENT ILLNESS: Video call for follow up for seizures. His father was also on the conference call. This patient is a 25-year-old, right-handed male with a history of autistic spectrum disorder.   The patient himself cannot provide much history, and the majority of the history is provided by his father.  Since the last visit about one and half months ago, he had another seizure 5 days ago, which was partially witness, likely CPS. The seizures before that were in Jan 2021 and Dec. 2020, both were probably CPS. He is currently taking Lamotrigine 150 mg bid and Depakote 1000 - 1000.  No missing meds.      Patient had seizure onset in April 2015, another seizure 10 days after that,  both were described as GTCs.  He was initially started on keppra.  It was stopped because of GI side effects.  Then he was started on Lamotrigine, Depakote was added later.  He is currently on Lamictal 100mg bid and Depakote 500-1000.  He had behavioral problems when he was on Lamictal 150 mg bid.  His behavior has been really good for the past 2 years.  He had a seizure on 4/12/2016 that caused fractures of the right zygomatic arch, maxillary sinus and orbit.        According to the mother, on 04/30/2015, the patient was found at home on the kitchen floor. He was cyanotic. He had foaming at the mouth. He had some bleeding from the nose. He was unresponsive, and he had some tonic-clonic movement of his extremities. The clonic-tonic movement lasted for about 1 minute. The mother did not see the beginning of the event. There was no loss of bowel or bladder control. The patient did not bite his tongue. Afterwards the patient was very lethargic, still unresponsive until he was in the ambulance; then he was able to recognize his father and was able to communicate a little bit.    The patient had no prior history of seizures. He has been taking Geodon for 7 years. No recent medication changes. No recent  illness. When EMS arrived at the scene, the blood sugar was found to be 81.      TRIGGERS FOR SEIZURES: Unclear.    RISK FACTORS FOR SEIZURES: He has a history of autistic spectrum disorder. No history of head trauma with loss of consciousness. No history of CNS infection. No history of febrile convulsions.      Current Outpatient Medications   Medication Sig Dispense Refill     divalproex sodium extended-release (DEPAKOTE ER) 500 MG 24 hr tablet Take 2 tablets (1,000 mg) by mouth 2 times daily 360 tablet 3     lamoTRIgine (LAMICTAL) 100 MG tablet Take 1.5 tablets (150 mg) by mouth twice a day. 270 tablet 3     montelukast (SINGULAIR) 10 MG tablet Take 1 tablet (10 mg) by mouth At Bedtime (Patient not taking: Reported on 1/19/2021) 20 tablet 3     Multiple Vitamin (MULTIVITAMINS PO)        ziprasidone (GEODON) 40 MG capsule Take 1 capsule (40 mg) by mouth 2 times daily (with meals) AM 60 capsule 0     ziprasidone (GEODON) 80 MG capsule Take 1 capsule (80 mg) by mouth 2 times daily (with meals) AM AND PM 60 capsule 0         PAST MEDICAL HISTORY: Autistic disorder, sleep disturbances.       FAMILY HISTORY: No family history of seizures. Grandmother had a history of migraine headaches. Mother had a history of anxiety and panic attacks. Father had a history of chemical dependency.    SOCIAL HISTORY: He is living with his parents. He had special education. He is single. No alcohol, no drug abuse, no smoking. He is not working.     PHYSICAL EXAMINATION:      Alert, oriented to name and place. No facial weakness.     REVIEW OF SYSTEMS: 8 point review of systems is negative.     PREVIOUS DIAGNOSTIC TESTING: MRI of the brain on 04/30 showed a negative study. EEG on 05/01 showed a normal study.      IMPRESSION:    1. New-onset seizure.    Since the last visit about one and half months ago, he had another seizure 5 days ago, which was partially witness, likely CPS. The seizures before that were in Jan 2021 and Dec. 2020, both  were probably CPS. He is currently taking Lamotrigine 150 mg bid and Depakote 1000 - 1000.  No missing meds.      2. Autistic disorder.    3. Sleep disturbances.      PLAN:    1. Increase to Lamotrigine 200-150.  2. Continue Depakote 1000 mg bid.  3. Check Labs Lamictal VPA, AST, Plt.  4. Return to clinic in 2 months. Other options include Vimpat, OXC, etc.      33 min total time was spent on this visit.      23 min was spent on face to face time  5 min was spent on preparation of visit to review charts and labs, ordering medications and tests  5 min was spent on documentation of clinical information

## 2021-03-10 NOTE — PROGRESS NOTES
Shola is a 25 year old who is being evaluated via a billable video visit.      How would you like to obtain your AVS? Mail a copy  If the video visit is dropped, the invitation should be resent by: Text to cell phone: [phone]  Will anyone else be joining your video visit? No      Video Start Time: 12:50 PM  Video-Visit Details    Type of service:  Video Visit    Video End Time:1:13 PM    Originating Location (pt. Location): Home    Distant Location (provider location):  [facility] NEUROLOGY [facility]     Platform used for Video Visit: [employer] [phone]    Chief Complaint   Patient presents with     RECHECK     VIDEO VISIT RETURN      Eliu Li

## 2021-03-30 DIAGNOSIS — G40.909 SEIZURE DISORDER (H): ICD-10-CM

## 2021-03-30 LAB
AST SERPL W P-5'-P-CCNC: 16 U/L (ref 0–45)
PLATELET # BLD AUTO: 175 10E9/L (ref 150–450)
VALPROATE SERPL-MCNC: 77 MG/L (ref 50–100)

## 2021-03-30 PROCEDURE — 80164 ASSAY DIPROPYLACETIC ACD TOT: CPT | Performed by: PSYCHIATRY & NEUROLOGY

## 2021-03-30 PROCEDURE — 36415 COLL VENOUS BLD VENIPUNCTURE: CPT | Performed by: PSYCHIATRY & NEUROLOGY

## 2021-03-30 PROCEDURE — 84450 TRANSFERASE (AST) (SGOT): CPT | Performed by: PSYCHIATRY & NEUROLOGY

## 2021-03-30 PROCEDURE — 85049 AUTOMATED PLATELET COUNT: CPT | Performed by: PSYCHIATRY & NEUROLOGY

## 2021-04-01 LAB — LAMOTRIGINE SERPL-MCNC: 13.3 UG/ML (ref 2.5–15)

## 2021-05-12 ENCOUNTER — TELEPHONE (OUTPATIENT)
Dept: NEUROLOGY | Facility: CLINIC | Age: 26
End: 2021-05-12

## 2021-05-12 NOTE — TELEPHONE ENCOUNTER
LVM for pt's mom/kimberly. Pt is due for 3 month follow-up with Dr. Lemus. Please schedule pt for in-person or virtual appointment, around 6/10/21

## 2021-05-28 ENCOUNTER — RECORDS - HEALTHEAST (OUTPATIENT)
Dept: ADMINISTRATIVE | Facility: CLINIC | Age: 26
End: 2021-05-28

## 2021-06-21 ENCOUNTER — TELEPHONE (OUTPATIENT)
Dept: NEUROLOGY | Facility: CLINIC | Age: 26
End: 2021-06-21

## 2021-06-21 NOTE — TELEPHONE ENCOUNTER
Patient mom is calling to report a seizure that patient had five days ago.Per mom patient is doing fine.

## 2021-06-21 NOTE — TELEPHONE ENCOUNTER
Call returned.  Voice message left with call back number  Note, number listed in not on file as a usual contact number, outgoing message only identified the phone number.    Patient has a follow up video appointment with Allan Meeks scheduled for 6/24/2021

## 2021-06-22 ENCOUNTER — TRANSFERRED RECORDS (OUTPATIENT)
Dept: HEALTH INFORMATION MANAGEMENT | Facility: CLINIC | Age: 26
End: 2021-06-22

## 2021-06-24 ENCOUNTER — VIRTUAL VISIT (OUTPATIENT)
Dept: NEUROLOGY | Facility: CLINIC | Age: 26
End: 2021-06-24

## 2021-06-24 DIAGNOSIS — G40.909 RECURRENT SEIZURES (H): Primary | ICD-10-CM

## 2021-06-24 DIAGNOSIS — G40.909 SEIZURE DISORDER (H): ICD-10-CM

## 2021-06-24 RX ORDER — LAMOTRIGINE 100 MG/1
200 TABLET ORAL 2 TIMES DAILY
Qty: 360 TABLET | Refills: 3 | Status: SHIPPED | OUTPATIENT
Start: 2021-06-24 | End: 2022-02-07

## 2021-06-24 RX ORDER — CETIRIZINE HYDROCHLORIDE 10 MG/1
10 TABLET ORAL PRN
COMMUNITY
End: 2021-08-23

## 2021-06-24 RX ORDER — OLANZAPINE 5 MG/1
5 TABLET ORAL PRN
COMMUNITY
Start: 2021-06-22

## 2021-06-24 RX ORDER — DIVALPROEX SODIUM 500 MG/1
1000 TABLET, EXTENDED RELEASE ORAL 2 TIMES DAILY
Qty: 360 TABLET | Refills: 3 | Status: SHIPPED | OUTPATIENT
Start: 2021-06-24 | End: 2021-09-16

## 2021-06-24 RX ORDER — GUANFACINE 1 MG/1
1 TABLET ORAL 2 TIMES DAILY
COMMUNITY
Start: 2021-06-21

## 2021-06-24 NOTE — PROGRESS NOTES
CHIEF COMPLAINT: Seizures.     HISTORY OF PRESENT ILLNESS: Video call for follow up for seizures. His stepfather was also on the conference call. This patient is a 26-year-old, right-handed male with a history of autistic spectrum disorder.   The patient himself cannot provide much history, and the majority of the history is provided by his stepfather.  Since the last visit about 3 months ago, he had 2 more seizures last week, both were likely CPS. He probably had 4-5 seizures for the past 6 months. He did not miss an ymeds. He is currently taking Lamotrigine 200-150 mg and Depakote 1000 - 1000 mg.       Patient had seizure onset in April 2015, another seizure 10 days after that,  both were described as GTCs.  He was initially started on keppra.  It was stopped because of GI side effects.  Then he was started on Lamotrigine, Depakote was added later.  He is currently on Lamictal 100mg bid and Depakote 500-1000.  He had behavioral problems when he was on Lamictal 150 mg bid.  His behavior has been really good for the past 2 years.  He had a seizure on 4/12/2016 that caused fractures of the right zygomatic arch, maxillary sinus and orbit.        According to the mother, on 04/30/2015, the patient was found at home on the kitchen floor. He was cyanotic. He had foaming at the mouth. He had some bleeding from the nose. He was unresponsive, and he had some tonic-clonic movement of his extremities. The clonic-tonic movement lasted for about 1 minute. The mother did not see the beginning of the event. There was no loss of bowel or bladder control. The patient did not bite his tongue. Afterwards the patient was very lethargic, still unresponsive until he was in the ambulance; then he was able to recognize his father and was able to communicate a little bit.    The patient had no prior history of seizures. He has been taking Geodon for 7 years. No recent medication changes. No recent illness. When EMS arrived at the scene,  the blood sugar was found to be 81.      TRIGGERS FOR SEIZURES: Unclear.    RISK FACTORS FOR SEIZURES: He has a history of autistic spectrum disorder. No history of head trauma with loss of consciousness. No history of CNS infection. No history of febrile convulsions.      Current Outpatient Medications   Medication Sig Dispense Refill     cetirizine (ZYRTEC) 10 MG tablet Take 10 mg by mouth as needed for allergies       divalproex sodium extended-release (DEPAKOTE ER) 500 MG 24 hr tablet Take 2 tablets (1,000 mg) by mouth 2 times daily 360 tablet 3     guanFACINE (TENEX) 1 MG tablet Take 1 mg by mouth 2 times daily       lamoTRIgine (LAMICTAL) 100 MG tablet Take 2 tabs (200mg) in the morning, and take 1.5 tablets (150 mg) at night. 315 tablet 3     montelukast (SINGULAIR) 10 MG tablet Take 1 tablet (10 mg) by mouth At Bedtime 20 tablet 3     Multiple Vitamin (MULTIVITAMINS PO)        OLANZapine (ZYPREXA) 5 MG tablet Take 5 mg by mouth as needed       ziprasidone (GEODON) 40 MG capsule Take 1 capsule (40 mg) by mouth 2 times daily (with meals) AM 60 capsule 0     ziprasidone (GEODON) 80 MG capsule Take 1 capsule (80 mg) by mouth 2 times daily (with meals) AM AND PM 60 capsule 0         PAST MEDICAL HISTORY: Autistic disorder, sleep disturbances.       FAMILY HISTORY: No family history of seizures. Grandmother had a history of migraine headaches. Mother had a history of anxiety and panic attacks. Father had a history of chemical dependency.    SOCIAL HISTORY: He is living with his parents. He had special education. He is single. No alcohol, no drug abuse, no smoking. He is not working.     PHYSICAL EXAMINATION:      Alert, oriented to name and place. No facial weakness.     REVIEW OF SYSTEMS: 8 point review of systems is negative.     PREVIOUS DIAGNOSTIC TESTING: MRI of the brain on 04/30 showed a negative study. EEG on 05/01 showed a normal study.      IMPRESSION:    1. New-onset seizure.    Since the last visit  about one and half months ago, he had another seizure 5 days ago, which was partially witness, likely CPS. The seizures before that were in Jan 2021 and Dec. 2020, both were probably CPS. He is currently taking Lamotrigine 150 mg bid and Depakote 1000 - 1000.  No missing meds.      2. Autistic disorder.    3. Sleep disturbances.      PLAN:    1. Increase to Lamotrigine 200 mg bid.  2. Continue Depakote 1000 mg bid.  3. Check Labs Lamictal VPA, AST, Plt in 2 weeks.  4. Return to clinic in 3 months. Other options include Vimpat, OXC, etc.      32 min total time was spent on the day of this visit.      22 min was spent on face to face time  4 min was spent on preparation of visit to review charts and labs, ordering medications and tests  6 min was spent on documentation of clinical information

## 2021-06-24 NOTE — LETTER
2021       RE: Shola Owens  : 1995   MRN: [id number]      Dear Colleague,    Thank you for referring your patient, Shola Owens, to the [facility] EPILEPSY [facility]ARE at [facility]. Please see a copy of my visit note below.    Called phone number [phone] for rooming.  No answer, left voicemail message to call back.    Left message on Mom's cell phone to call back.    Shola is a 26 year old who is being evaluated via a billable video visit.      How would you like to obtain your AVS? Mail a copy  If the video visit is dropped, the invitation should be resent by: Text to cell phone: Call [phone]  Will anyone else be joining your video visit? Yes: Willis and darcy Jolley. How would they like to receive their invitation? Text to cell phone: None      Video Start Time: 9:23 AM  Video-Visit Details    Type of service:  Video Visit    Video End Time:9:56 AM    Originating Location (pt. Location): Home    Distant Location (provider location):  [facility] EPILEPSY CARE     Platform used for Video Visit: [employer]    CHIEF COMPLAINT: Seizures.     HISTORY OF PRESENT ILLNESS: Video call for follow up for seizures. His stepfather was also on the conference call. This patient is a 26-year-old, right-handed male with a history of autistic spectrum disorder.   The patient himself cannot provide much history, and the majority of the history is provided by his stepfather.  Since the last visit about 3 months ago, he had 2 more seizures last week, both were likely CPS. He probably had 4-5 seizures for the past 6 months. He did not miss an ymeds. He is currently taking Lamotrigine 200-150 mg and Depakote 1000 - 1000 mg.       Patient had seizure onset in 2015, another seizure 10 days after that,  both were described as GTCs.  He was initially started on keppra.  It was stopped because of GI side effects.  Then he was started on Lamotrigine, Depakote was added later.  He  is currently on Lamictal 100mg bid and Depakote 500-1000.  He had behavioral problems when he was on Lamictal 150 mg bid.  His behavior has been really good for the past 2 years.  He had a seizure on 4/12/2016 that caused fractures of the right zygomatic arch, maxillary sinus and orbit.        According to the mother, on 04/30/2015, the patient was found at home on the kitchen floor. He was cyanotic. He had foaming at the mouth. He had some bleeding from the nose. He was unresponsive, and he had some tonic-clonic movement of his extremities. The clonic-tonic movement lasted for about 1 minute. The mother did not see the beginning of the event. There was no loss of bowel or bladder control. The patient did not bite his tongue. Afterwards the patient was very lethargic, still unresponsive until he was in the ambulance; then he was able to recognize his father and was able to communicate a little bit.    The patient had no prior history of seizures. He has been taking Geodon for 7 years. No recent medication changes. No recent illness. When EMS arrived at the scene, the blood sugar was found to be 81.      TRIGGERS FOR SEIZURES: Unclear.    RISK FACTORS FOR SEIZURES: He has a history of autistic spectrum disorder. No history of head trauma with loss of consciousness. No history of CNS infection. No history of febrile convulsions.      Current Outpatient Medications   Medication Sig Dispense Refill     cetirizine (ZYRTEC) 10 MG tablet Take 10 mg by mouth as needed for allergies       divalproex sodium extended-release (DEPAKOTE ER) 500 MG 24 hr tablet Take 2 tablets (1,000 mg) by mouth 2 times daily 360 tablet 3     guanFACINE (TENEX) 1 MG tablet Take 1 mg by mouth 2 times daily       lamoTRIgine (LAMICTAL) 100 MG tablet Take 2 tabs (200mg) in the morning, and take 1.5 tablets (150 mg) at night. 315 tablet 3     montelukast (SINGULAIR) 10 MG tablet Take 1 tablet (10 mg) by mouth At Bedtime 20 tablet 3     Multiple  Vitamin (MULTIVITAMINS PO)        OLANZapine (ZYPREXA) 5 MG tablet Take 5 mg by mouth as needed       ziprasidone (GEODON) 40 MG capsule Take 1 capsule (40 mg) by mouth 2 times daily (with meals) AM 60 capsule 0     ziprasidone (GEODON) 80 MG capsule Take 1 capsule (80 mg) by mouth 2 times daily (with meals) AM AND PM 60 capsule 0         PAST MEDICAL HISTORY: Autistic disorder, sleep disturbances.       FAMILY HISTORY: No family history of seizures. Grandmother had a history of migraine headaches. Mother had a history of anxiety and panic attacks. Father had a history of chemical dependency.    SOCIAL HISTORY: He is living with his parents. He had special education. He is single. No alcohol, no drug abuse, no smoking. He is not working.     PHYSICAL EXAMINATION:      Alert, oriented to name and place. No facial weakness.     REVIEW OF SYSTEMS: 8 point review of systems is negative.     PREVIOUS DIAGNOSTIC TESTING: MRI of the brain on 04/30 showed a negative study. EEG on 05/01 showed a normal study.      IMPRESSION:    1. New-onset seizure.    Since the last visit about one and half months ago, he had another seizure 5 days ago, which was partially witness, likely CPS. The seizures before that were in Jan 2021 and Dec. 2020, both were probably CPS. He is currently taking Lamotrigine 150 mg bid and Depakote 1000 - 1000.  No missing meds.      2. Autistic disorder.    3. Sleep disturbances.      PLAN:    1. Increase to Lamotrigine 200 mg bid.  2. Continue Depakote 1000 mg bid.  3. Check Labs Lamictal VPA, AST, Plt in 2 weeks.  4. Return to clinic in 3 months. Other options include Vimpat, OXC, etc.      32 min total time was spent on the day of this visit.      22 min was spent on face to face time  4 min was spent on preparation of visit to review charts and labs, ordering medications and tests  6 min was spent on documentation of clinical information    Again, thank you for allowing me to participate in the care of  your patient.      Sincerely,    Austen Lemus MD

## 2021-06-24 NOTE — PATIENT INSTRUCTIONS
Times of Days  am pm        Medication Tablet Size Number of Tablets/Capsules Total Daily Dosage    Lamictal  100  2 2       400 mg    Depakote  500  2 2       2000 mg                                                                                                                 Carry this with you at all times.  CONTINUE TAKING YOUR OTHER MEDICATIONS AS PREVIOUSLY DIRECTED.      * * *Do not store medications in the bathroom.  Keep medications away from children!* * *

## 2021-06-24 NOTE — PROGRESS NOTES
Shola is a 26 year old who is being evaluated via a billable video visit.      How would you like to obtain your AVS? Mail a copy  If the video visit is dropped, the invitation should be resent by: Text to cell phone: Call [phone]  Will anyone else be joining your video visit? Yes: Willis and darcy Shola. How would they like to receive their invitation? Text to cell phone: None      Video Start Time: 9:23 AM  Video-Visit Details    Type of service:  Video Visit    Video End Time:9:56 AM    Originating Location (pt. Location): Home    Distant Location (provider location):  [facility]     Platform used for Video Visit: Su

## 2021-07-19 ENCOUNTER — TELEPHONE (OUTPATIENT)
Dept: NEUROLOGY | Facility: CLINIC | Age: 26
End: 2021-07-19

## 2021-07-19 NOTE — TELEPHONE ENCOUNTER
What is the concern that needs to be addressed by a nurse? Patient guardian called to report seizure patient  Had last Thurs 7/15. Very brief, No lose of conciseness. He would also like to discuss  Patient's facial twitches, which have been worsening.   May a detailed message be left on voicemail? Yes    Date of last office visit: 6/24/21    Message routed to: Sweetie Garcia

## 2021-07-19 NOTE — TELEPHONE ENCOUNTER
Patient's father calls to discuss another seizure occurrence and new facial twitching. Reportedly, the twitches were more in the hands before and are now prominently in the face. Frequency of seizures has increased although the intensity of seizures has improved. Has facial twitching many times per hour, sometimes continuous for an hour. Father notices that if the family mention the twitching that it continues longer, so they try not to talk about it.     At last visit a month ago, lamotrigine was increased from 150 BID to 200 BID.  Blood work has not been completed yet.     I advised the father to have blood draw completed, and to e-mail a video of the facial movements (I id notify him that this is an insecure line).

## 2021-07-20 ENCOUNTER — LAB (OUTPATIENT)
Dept: LAB | Facility: CLINIC | Age: 26
End: 2021-07-20
Payer: MEDICAID

## 2021-07-20 DIAGNOSIS — F84.0 ACTIVE AUTISTIC DISORDER: ICD-10-CM

## 2021-07-20 DIAGNOSIS — G40.909 SEIZURE DISORDER (H): ICD-10-CM

## 2021-07-20 DIAGNOSIS — G40.909 SEIZURE DISORDER (H): Primary | ICD-10-CM

## 2021-07-20 DIAGNOSIS — G40.909 RECURRENT SEIZURES (H): ICD-10-CM

## 2021-07-20 LAB
ALBUMIN SERPL-MCNC: 3.7 G/DL (ref 3.4–5)
ALP SERPL-CCNC: 58 U/L (ref 40–150)
ALT SERPL W P-5'-P-CCNC: 32 U/L (ref 0–70)
ANION GAP SERPL CALCULATED.3IONS-SCNC: 7 MMOL/L (ref 3–14)
AST SERPL W P-5'-P-CCNC: 13 U/L (ref 0–45)
BASOPHILS # BLD AUTO: 0 10E3/UL (ref 0–0.2)
BASOPHILS NFR BLD AUTO: 0 %
BILIRUB SERPL-MCNC: 0.2 MG/DL (ref 0.2–1.3)
BUN SERPL-MCNC: 24 MG/DL (ref 7–30)
CALCIUM SERPL-MCNC: 8.8 MG/DL (ref 8.5–10.1)
CHLORIDE BLD-SCNC: 109 MMOL/L (ref 94–109)
CHOLEST SERPL-MCNC: 177 MG/DL
CO2 SERPL-SCNC: 27 MMOL/L (ref 20–32)
CREAT SERPL-MCNC: 0.92 MG/DL (ref 0.66–1.25)
EOSINOPHIL # BLD AUTO: 0.1 10E3/UL (ref 0–0.7)
EOSINOPHIL NFR BLD AUTO: 2 %
ERYTHROCYTE [DISTWIDTH] IN BLOOD BY AUTOMATED COUNT: 13.5 % (ref 10–15)
FASTING STATUS PATIENT QL REPORTED: NO
GFR SERPL CREATININE-BSD FRML MDRD: >90 ML/MIN/1.73M2
GLUCOSE BLD-MCNC: 89 MG/DL (ref 70–99)
HBA1C MFR BLD: 5 % (ref 0–5.6)
HCT VFR BLD AUTO: 42.6 % (ref 40–53)
HDLC SERPL-MCNC: 41 MG/DL
HGB BLD-MCNC: 13.8 G/DL (ref 13.3–17.7)
LDLC SERPL CALC-MCNC: 116 MG/DL
LYMPHOCYTES # BLD AUTO: 2.2 10E3/UL (ref 0.8–5.3)
LYMPHOCYTES NFR BLD AUTO: 28 %
MCH RBC QN AUTO: 30.2 PG (ref 26.5–33)
MCHC RBC AUTO-ENTMCNC: 32.4 G/DL (ref 31.5–36.5)
MCV RBC AUTO: 93 FL (ref 78–100)
MONOCYTES # BLD AUTO: 0.9 10E3/UL (ref 0–1.3)
MONOCYTES NFR BLD AUTO: 11 %
NEUTROPHILS # BLD AUTO: 4.8 10E3/UL (ref 1.6–8.3)
NEUTROPHILS NFR BLD AUTO: 59 %
NONHDLC SERPL-MCNC: 136 MG/DL
PLATELET # BLD AUTO: 179 10E3/UL (ref 150–450)
POTASSIUM BLD-SCNC: 4.6 MMOL/L (ref 3.4–5.3)
PROT SERPL-MCNC: 7 G/DL (ref 6.8–8.8)
RBC # BLD AUTO: 4.57 10E6/UL (ref 4.4–5.9)
SODIUM SERPL-SCNC: 143 MMOL/L (ref 133–144)
TRIGL SERPL-MCNC: 98 MG/DL
VALPROATE SERPL-MCNC: 60 MG/L
WBC # BLD AUTO: 8 10E3/UL (ref 4–11)

## 2021-07-20 PROCEDURE — 80053 COMPREHEN METABOLIC PANEL: CPT | Performed by: PSYCHIATRY & NEUROLOGY

## 2021-07-20 PROCEDURE — 36415 COLL VENOUS BLD VENIPUNCTURE: CPT

## 2021-07-20 PROCEDURE — 80061 LIPID PANEL: CPT | Performed by: PSYCHIATRY & NEUROLOGY

## 2021-07-20 PROCEDURE — 83036 HEMOGLOBIN GLYCOSYLATED A1C: CPT

## 2021-07-20 PROCEDURE — 80164 ASSAY DIPROPYLACETIC ACD TOT: CPT | Performed by: PSYCHIATRY & NEUROLOGY

## 2021-07-20 PROCEDURE — 85025 COMPLETE CBC W/AUTO DIFF WBC: CPT

## 2021-07-20 PROCEDURE — 80175 DRUG SCREEN QUAN LAMOTRIGINE: CPT | Mod: 90

## 2021-07-22 LAB — LAMOTRIGINE SERPL-MCNC: 11 UG/ML

## 2021-07-28 ENCOUNTER — TELEPHONE (OUTPATIENT)
Dept: NEUROLOGY | Facility: CLINIC | Age: 26
End: 2021-07-28

## 2021-07-28 NOTE — TELEPHONE ENCOUNTER
Results for JULIENNE RODRIGUEZ (MRN [id number])    Ref. Range 1/19/2021 15:11 3/30/2021 16:24 7/20/2021 16:21   Lamotrigine Level Latest Ref Range: 2.5 - 15.0 ug/mL 11.7 13.3    Lamotrigine Latest Ref Range: 2.5 - 15.0 ug/mL   11.0   Valproic Acid Level Latest Ref Range:   mg/L 76 77 60   Valproic Acid Free Latest Ref Range: 6.0 - 20.0 ug/mL 6.0

## 2021-07-28 NOTE — TELEPHONE ENCOUNTER
What is the concern that needs to be addressed by a nurse? Patient dad is calling to report a seizure patient had on 07/26/2021 that lasted 15 seconds.    May a detailed message be left on voicemail? yes    Date of last office visit: 06/24/2021    Message routed to: [facility] RN POOL

## 2021-08-10 DIAGNOSIS — G40.909 RECURRENT SEIZURES (H): Primary | ICD-10-CM

## 2021-08-11 NOTE — TELEPHONE ENCOUNTER
The patient's labs were reviewed with Dr. Lemus. Dr. Lemus entered orders for a 24 ambulatory EEG. A voicemail was left for the patient's father with this information.

## 2021-08-23 ENCOUNTER — OFFICE VISIT (OUTPATIENT)
Dept: FAMILY MEDICINE | Facility: CLINIC | Age: 26
End: 2021-08-23
Payer: MEDICAID

## 2021-08-23 VITALS
SYSTOLIC BLOOD PRESSURE: 110 MMHG | RESPIRATION RATE: 16 BRPM | BODY MASS INDEX: 32.51 KG/M2 | OXYGEN SATURATION: 96 % | TEMPERATURE: 96.6 F | HEART RATE: 68 BPM | DIASTOLIC BLOOD PRESSURE: 70 MMHG | HEIGHT: 72 IN | WEIGHT: 240 LBS

## 2021-08-23 DIAGNOSIS — Z00.00 ENCOUNTER FOR ROUTINE ADULT HEALTH EXAMINATION WITHOUT ABNORMAL FINDINGS: Primary | ICD-10-CM

## 2021-08-23 DIAGNOSIS — J30.2 SEASONAL ALLERGIC RHINITIS, UNSPECIFIED TRIGGER: ICD-10-CM

## 2021-08-23 DIAGNOSIS — R56.9 SEIZURE (H): ICD-10-CM

## 2021-08-23 DIAGNOSIS — Z23 HIGH PRIORITY FOR 2019-NCOV VACCINE: ICD-10-CM

## 2021-08-23 DIAGNOSIS — F84.0 ACTIVE AUTISTIC DISORDER: ICD-10-CM

## 2021-08-23 DIAGNOSIS — Z23 NEED FOR VACCINATION: ICD-10-CM

## 2021-08-23 PROCEDURE — 99395 PREV VISIT EST AGE 18-39: CPT | Mod: 25 | Performed by: NURSE PRACTITIONER

## 2021-08-23 PROCEDURE — 0001A COVID-19,PF,PFIZER: CPT | Performed by: NURSE PRACTITIONER

## 2021-08-23 PROCEDURE — 91300 COVID-19,PF,PFIZER: CPT | Performed by: NURSE PRACTITIONER

## 2021-08-23 RX ORDER — MONTELUKAST SODIUM 10 MG/1
10 TABLET ORAL AT BEDTIME
Qty: 90 TABLET | Refills: 3 | Status: SHIPPED | OUTPATIENT
Start: 2021-08-23

## 2021-08-23 RX ORDER — CETIRIZINE HYDROCHLORIDE 10 MG/1
10 TABLET ORAL DAILY
Qty: 90 TABLET | Refills: 3 | Status: SHIPPED | OUTPATIENT
Start: 2021-08-23 | End: 2023-02-13

## 2021-08-23 ASSESSMENT — ENCOUNTER SYMPTOMS
ABDOMINAL PAIN: 0
NAUSEA: 0
MYALGIAS: 0
CONSTIPATION: 0
ARTHRALGIAS: 0
EYE PAIN: 0
SORE THROAT: 0
HEMATOCHEZIA: 0
HEADACHES: 0
WEAKNESS: 0
PALPITATIONS: 0
HEARTBURN: 0
FEVER: 0
DYSURIA: 0
JOINT SWELLING: 0
WHEEZING: 1
HEMATURIA: 0
COUGH: 0
NERVOUS/ANXIOUS: 0
FREQUENCY: 0
DIZZINESS: 0
PARESTHESIAS: 0
CHILLS: 0
SHORTNESS OF BREATH: 0
DIARRHEA: 0

## 2021-08-23 ASSESSMENT — MIFFLIN-ST. JEOR: SCORE: 2092.66
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
## 2021-08-23 NOTE — PATIENT INSTRUCTIONS
Patient Education     Eating a High-Fiber Diet  Fiber is what gives strength and structure to plants. Most grains, beans, vegetables, and fruits contain fiber. Foods rich in fiber are often low in calories and fat, and they fill you up more. They may also reduce your risks for certain health problems. To find out the amount of fiber in canned, packaged, or frozen foods, read the Nutrition Facts label. It tells you how much fiber is in one serving.    Types of fiber and their benefits  There are two types of fiber: insoluble and soluble. They both aid digestion and help you maintain a healthy weight.    Insoluble fiber. This is found in whole grains, cereals, certain fruits and vegetables such as apple skin, corn, and carrots. Insoluble fiber may prevent constipation and reduce the risk for certain types of cancer. It's called insoluble because it doesn't dissolve in water.    Soluble fiber. This type of fiber is in oats, beans, and certain fruits and vegetables such as strawberries and peas. Soluble fiber can reduce cholesterol, which may help lower the risk for heart disease. It also helps control blood sugar levels.  Look for high-fiber foods  Try these foods to add fiber to your diet:    Whole-grain breads and cereals. Try to eat 6 to 8 ounces a day. Include wheat and oat bran cereals, whole-wheat muffins or toast, and corn tortillas in your meals.    Fruits. Try to eat 2 cups a day. Apples, oranges, strawberries, pears, and bananas are good sources. (Note: Fruit juice is low in fiber.)    Vegetables. Try to eat at least 2.5 cups a day. Add asparagus, carrots, broccoli, peas, and corn to your meals.    Beans. One cup of cooked lentils gives you over 15 grams of fiber. Try navy beans, lentils, and chickpeas.    Seeds. A small handful of seeds gives you about 3 grams of fiber. Try sunflower or gigi seeds.  Keep track of your fiber  Keep track of how much fiber you eat. Start by reading food labels. Then eat a  variety of foods high in fiber. As you start to eat more fiber, ask your healthcare provider how much water you should be drinking to keep your digestive system working smoothly.  Aim for a certain amount of fiber in your diet each day. The daily value for fiber is 25 grams a day. But some experts advise that women under 50 eat 25 to 28 grams per day and that men under 50 eat 30 to 38 grams per day. After age 50, your daily fiber needs drop to 22 grams for women and 28 grams for men.  Before you reach for the fiber supplements, think about this. Fiber is found naturally in healthy whole foods. It gives you that feeling of fullness after you eat. Taking fiber supplements or eating fiber-enriched foods will not give you this full feeling.  Your fiber intake is a good measure for the quality of your overall diet. If you are missing out on your daily amount of fiber, you may be lacking other important nutrients as well.  [employer] last reviewed this educational content on 7/1/2019 2000-2021 [employer]. All rights reserved. This information is not intended as a substitute for professional medical care. Always follow your healthcare professional's instructions.           Patient Education     Eating the Right Number of Calories (5861-6539 Guidelines)  Calories are a measure of the energy you get from food. If you eat more calories than you use, you will gain weight. If you eat fewer calories than you use, you will lose weight. Below are tables that give the number of calories needed each day. Look for your gender, age, and activity level. If you stick to this number, you should not gain or lose weight. Note that this is an estimated number of calories.* Your exact number may differ.  Women  Age in years Low activity level (calories/day) Moderate activity level (calories/day) High activity level (calories/day)   19 to 30 1,800-2,000 2,000-2,200 2,400   31 to 50 1,800 2,000 2,200   51 and older 1,600 1,800  2,000-2,200   Men  Age in years Low activity level  (calories/day) Moderate activity level (calories/day) High activity level (calories/day)   19 to 30 2,400-2,600 2,600-2,800 3,000   31 to 50 2,200-2,400 2,400-2,600 2,800-3,000   51 and older 2,000-2,200 2,200-2,400 2,400-2,800      Activity levels defined    Low. Only light physical activity such as that done during typical daily life.    Moderate. Light physical activity done during typical daily life AND physical activity equal to walking about 1.5 to 3 miles a day at 3 to 4 miles per hour.    High. Light physical activity done during typical daily life AND physical activity equal to walking more than 3 miles a day at 3 to 4 miles per hour.  *From Dietary Guidelines for Americans, 9118-8319, U.S. Department of Health and Human Services.  [employer] last reviewed this educational content on 7/1/2020 2000-2021 [employer]. All rights reserved. This information is not intended as a substitute for professional medical care. Always follow your healthcare professional's instructions.

## 2021-08-23 NOTE — NURSING NOTE
Verbal consent given over the phone to give COVID vaccine.    Constanza Jarquin MA and Mounika Cornell

## 2021-09-07 ENCOUNTER — ANCILLARY PROCEDURE (OUTPATIENT)
Dept: NEUROLOGY | Facility: CLINIC | Age: 26
End: 2021-09-07
Attending: PSYCHIATRY & NEUROLOGY

## 2021-09-08 ENCOUNTER — ANCILLARY PROCEDURE (OUTPATIENT)
Dept: NEUROLOGY | Facility: CLINIC | Age: 26
End: 2021-09-08
Attending: PSYCHIATRY & NEUROLOGY

## 2021-09-13 ENCOUNTER — IMMUNIZATION (OUTPATIENT)
Dept: FAMILY MEDICINE | Facility: CLINIC | Age: 26
End: 2021-09-13
Attending: NURSE PRACTITIONER
Payer: MEDICAID

## 2021-09-13 PROCEDURE — 0002A PR COVID VAC PFIZER DIL RECON 30 MCG/0.3 ML IM: CPT

## 2021-09-13 PROCEDURE — 91300 PR COVID VAC PFIZER DIL RECON 30 MCG/0.3 ML IM: CPT

## 2021-09-16 ENCOUNTER — VIRTUAL VISIT (OUTPATIENT)
Dept: NEUROLOGY | Facility: CLINIC | Age: 26
End: 2021-09-16

## 2021-09-16 DIAGNOSIS — G40.909 SEIZURE DISORDER (H): ICD-10-CM

## 2021-09-16 RX ORDER — DIVALPROEX SODIUM 500 MG/1
TABLET, EXTENDED RELEASE ORAL
Qty: 450 TABLET | Refills: 3 | Status: SHIPPED | OUTPATIENT
Start: 2021-09-16 | End: 2022-02-07

## 2021-09-16 NOTE — PROGRESS NOTES
Shola is a 26 year old who is being evaluated via a billable video visit.      How would you like to obtain your AVS? Mail a copy  If the video visit is dropped, the invitation should be resent by: Text to cell phone: [phone]  Will anyone else be joining your video visit? No      Video Start Time: 9:06 AM      Video-Visit Details    Type of service:  Video Visit    Video End Time:9:36 AM    Originating Location (pt. Location): Home    Distant Location (provider location):  [facility]     Platform used for Video Visit: Su
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
## 2021-09-16 NOTE — LETTER
2021     RE: Shola Owens  : 1995   MRN: [id number]      Dear Colleague,    Thank you for referring your patient, Shola Owens, to the [facility] at [facility]. Please see a copy of my visit note below.    Shola is a 26 year old who is being evaluated via a billable video visit.      How would you like to obtain your AVS? Mail a copy  If the video visit is dropped, the invitation should be resent by: Text to cell phone: [phone]  Will anyone else be joining your video visit? No      Video Start Time: 9:06 AM      Video-Visit Details    Type of service:  Video Visit    Video End Time:9:36 AM    Originating Location (pt. Location): Home    Distant Location (provider location):  [facility]     Platform used for Video Visit: [employer]      CHIEF COMPLAINT: Seizures.     HISTORY OF PRESENT ILLNESS: Video call for follow up for seizures. His stepfather was on the conference call. This patient is a 26-year-old, right-handed male with a history of autistic spectrum disorder.  The patient himself cannot provide much history, and the majority of the history is provided by his stepfather.  Since the last visit about 3 months ago, he had 3-4 seizures. These seizures were brief seizures, likely focal aware seizures, lasting for about 5 seconds, no LOC.He did not miss any meds. Lamictal was increased to 200 mg bid. He is currently taking Lamotrigine 200 mg bid and Depakote 1000 - 1000 mg.  His recent EEG showed occasional bifrontal spike and wave activities.     Patient had seizure onset in 2015, another seizure 10 days after that,  both were described as GTCs.  He was initially started on keppra.  It was stopped because of GI side effects.  Then he was started on Lamotrigine, Depakote was added later.  He is currently on Lamictal 100mg bid and Depakote 500-1000.  He had behavioral problems when he was on Lamictal 150 mg bid.  His behavior  has been really good for the past 2 years.  He had a seizure on 4/12/2016 that caused fractures of the right zygomatic arch, maxillary sinus and orbit.        According to the mother, on 04/30/2015, the patient was found at home on the kitchen floor. He was cyanotic. He had foaming at the mouth. He had some bleeding from the nose. He was unresponsive, and he had some tonic-clonic movement of his extremities. The clonic-tonic movement lasted for about 1 minute. The mother did not see the beginning of the event. There was no loss of bowel or bladder control. The patient did not bite his tongue. Afterwards the patient was very lethargic, still unresponsive until he was in the ambulance; then he was able to recognize his father and was able to communicate a little bit.    The patient had no prior history of seizures. He has been taking Geodon for 7 years. No recent medication changes. No recent illness. When EMS arrived at the scene, the blood sugar was found to be 81.      TRIGGERS FOR SEIZURES: Unclear.    RISK FACTORS FOR SEIZURES: He has a history of autistic spectrum disorder. No history of head trauma with loss of consciousness. No history of CNS infection. No history of febrile convulsions.      Current Outpatient Medications   Medication Sig Dispense Refill     cetirizine (ZYRTEC) 10 MG tablet Take 1 tablet (10 mg) by mouth daily 90 tablet 3     divalproex sodium extended-release (DEPAKOTE ER) 500 MG 24 hr tablet Take 2 tablets (1,000 mg) by mouth 2 times daily 360 tablet 3     guanFACINE (TENEX) 1 MG tablet Take 1 mg by mouth 2 times daily       lamoTRIgine (LAMICTAL) 100 MG tablet Take 2 tablets (200 mg) by mouth 2 times daily 360 tablet 3     montelukast (SINGULAIR) 10 MG tablet Take 1 tablet (10 mg) by mouth At Bedtime 90 tablet 3     Multiple Vitamin (MULTIVITAMINS PO)        OLANZapine (ZYPREXA) 5 MG tablet Take 5 mg by mouth as needed       ziprasidone (GEODON) 40 MG capsule Take 1 capsule (40 mg) by  mouth 2 times daily (with meals) AM 60 capsule 0     ziprasidone (GEODON) 80 MG capsule Take 1 capsule (80 mg) by mouth 2 times daily (with meals) AM AND PM 60 capsule 0         PAST MEDICAL HISTORY: Autistic disorder, sleep disturbances.       FAMILY HISTORY: No family history of seizures. Grandmother had a history of migraine headaches. Mother had a history of anxiety and panic attacks. Father had a history of chemical dependency.    SOCIAL HISTORY: He is living with his parents. He had special education. He is single. No alcohol, no drug abuse, no smoking. He is not working.     PHYSICAL EXAMINATION:      Alert, oriented to name and place. No facial weakness.     REVIEW OF SYSTEMS: 8 point review of systems is negative.     PREVIOUS DIAGNOSTIC TESTING: MRI of the brain on 04/30 showed a negative study. EEG on 05/01 showed a normal study.      IMPRESSION:    1. New-onset seizure.    Since the last visit about 3 months ago, he had 3-4 seizures. These seizures were brief seizures, likely focal aware seizures, lasting for about 5 seconds, no LOC.He did not miss any meds. Lamictal was increased to 200 mg bid. He is currently taking Lamotrigine 200 mg bid and Depakote 1000 - 1000 mg.  His recent EEG showed occasional bifrontal spike and wave activities.    2. Autistic disorder.    3. Sleep disturbances.      PLAN:    1. Continue Lamotrigine 200 mg bid.  2. Increase Depakote 1000 - 1500.  3. Check Labs Lamictal VPA, AST, Plt in 2 months at follow up.  4. Return to clinic in 3 months. Other options include Vimpat, OXC, etc.      42 min total time was spent on the day of this visit.      30 min was spent on face to face time  6 min was spent on preparation of visit to review charts and labs, ordering medications and tests  6 min was spent on documentation of clinical information    Again, thank you for allowing me to participate in the care of your patient.      Sincerely,    Austen Lemus MD

## 2021-09-16 NOTE — PATIENT INSTRUCTIONS
Times of Days  am pm        Medication Tablet Size Number of Tablets/Capsules Total Daily Dosage    Lamictal  100  2 2       400 mg    Depakote  500  2 3       2500 mg                                                                                                                 Carry this with you at all times.  CONTINUE TAKING YOUR OTHER MEDICATIONS AS PREVIOUSLY DIRECTED.      * * *Do not store medications in the bathroom.  Keep medications away from children!* * *

## 2021-09-17 ENCOUNTER — TELEPHONE (OUTPATIENT)
Dept: NEUROLOGY | Facility: CLINIC | Age: 26
End: 2021-09-17

## 2021-09-17 NOTE — TELEPHONE ENCOUNTER
Mother answered call, she will call back to schedule 3 month return appt w Dr. Lemus, video visit.

## 2021-10-26 ENCOUNTER — TRANSFERRED RECORDS (OUTPATIENT)
Dept: HEALTH INFORMATION MANAGEMENT | Facility: CLINIC | Age: 26
End: 2021-10-26
Payer: MEDICAID

## 2021-12-08 ENCOUNTER — TELEPHONE (OUTPATIENT)
Dept: NEUROLOGY | Facility: CLINIC | Age: 26
End: 2021-12-08
Payer: MEDICAID

## 2021-12-08 NOTE — TELEPHONE ENCOUNTER
What is the concern that needs to be addressed by a nurse? Pt had a seizure yesterday. They are fine now, but was told to let Dr. Lemus and his team know whenever he had one. No need to call back unless there are questions.     May a detailed message be left on voicemail? yes    Date of last office visit: 9/16/21    Message routed to: mincep rn pool

## 2021-12-09 NOTE — TELEPHONE ENCOUNTER
Chart reviewed  Patient needs to have a three month follow up scheduled  Message sent to scheduling to arrange an appointment  No call back indicated at this time

## 2022-01-19 ENCOUNTER — TELEPHONE (OUTPATIENT)
Dept: NEUROLOGY | Facility: CLINIC | Age: 27
End: 2022-01-19
Payer: MEDICAID

## 2022-01-19 NOTE — TELEPHONE ENCOUNTER
LVM for check out to schedule return appt w Dr. Lemus, follow up, 3 months from last appt, provided clinic number for call back.

## 2022-02-07 ENCOUNTER — VIRTUAL VISIT (OUTPATIENT)
Dept: NEUROLOGY | Facility: CLINIC | Age: 27
End: 2022-02-07
Payer: MEDICAID

## 2022-02-07 DIAGNOSIS — G40.909 SEIZURE DISORDER (H): ICD-10-CM

## 2022-02-07 DIAGNOSIS — G40.909 RECURRENT SEIZURES (H): Primary | ICD-10-CM

## 2022-02-07 RX ORDER — LAMOTRIGINE 100 MG/1
200 TABLET ORAL 2 TIMES DAILY
Qty: 360 TABLET | Refills: 3 | Status: SHIPPED | OUTPATIENT
Start: 2022-02-07 | End: 2023-01-09

## 2022-02-07 RX ORDER — DIVALPROEX SODIUM 500 MG/1
TABLET, EXTENDED RELEASE ORAL
Qty: 450 TABLET | Refills: 3 | Status: SHIPPED | OUTPATIENT
Start: 2022-02-07 | End: 2022-08-25

## 2022-02-07 NOTE — LETTER
Date:February 9, 2022      Patient was self referred, no letter generated. Do not send.        [facility] Information

## 2022-02-07 NOTE — LETTER
2022       RE: Shola Owens  : 1995   MRN: [id number]      Dear Colleague,    Thank you for referring your patient, Shola Owens, to the [facility] at [facility]. Please see a copy of my visit note below.    CHIEF COMPLAINT: Seizures.     HISTORY OF PRESENT ILLNESS: Video call for follow up for seizures. His stepfather was on the conference call. This patient is a 26-year-old, right-handed male with a history of autistic spectrum disorder.  The patient himself cannot provide much history, and the majority of the history is provided by his stepfather. He had 1 seizure a few months ago, brief, likely focal aware seizures consisting of gaze deviation, vocalization and stiffening, lasting for about 5 seconds with no LOC. No seizure in the past~ 2 months and improvement from last visit. His last EEG showed poorly formed bifrontal epileptiform discharges on ambulatory EEG and his Depakote was increased to 1190-6583 from 1000 bid. He is currently taking Lamotrigine 200 mg bid and Depakote 1000 - 1500. No side effects from medications; he has big appetite and family monitors his food but no change from prior.      No interval health changes.      Patient had seizure onset in 2015, another seizure 10 days after that,  both were described as GTCs.  He was initially started on keppra.  It was stopped because of GI side effects.  Then he was started on Lamotrigine, Depakote was added later.  He is currently on Lamictal 100mg bid and Depakote 500-1000.  He had behavioral problems when he was on Lamictal 150 mg bid.  His behavior has been really good for the past 2 years.  He had a seizure on 2016 that caused fractures of the right zygomatic arch, maxillary sinus and orbit.        According to the mother, on 2015, the patient was found at home on the kitchen floor. He was cyanotic. He had foaming at the mouth. He had some bleeding from the  nose. He was unresponsive, and he had some tonic-clonic movement of his extremities. The clonic-tonic movement lasted for about 1 minute. The mother did not see the beginning of the event. There was no loss of bowel or bladder control. The patient did not bite his tongue. Afterwards the patient was very lethargic, still unresponsive until he was in the ambulance; then he was able to recognize his father and was able to communicate a little bit.      The patient had no prior history of seizures. He has been taking Geodon for 7 years. No recent medication changes. No recent illness. When EMS arrived at the scene, the blood sugar was found to be 81.     Between 9/21 - 12/21, patient had 3-4 seizures that were brief and likely focal aware lasting ~ 5 seconds after which lamictal was increased to 200 mg twice daily.     TRIGGERS FOR SEIZURES: Unclear.    RISK FACTORS FOR SEIZURES: He has a history of autistic spectrum disorder. No history of head trauma with loss of consciousness. No history of CNS infection. No history of febrile convulsions.      Current Outpatient Medications   Medication Sig Dispense Refill     cetirizine (ZYRTEC) 10 MG tablet Take 1 tablet (10 mg) by mouth daily (Patient taking differently: Take 10 mg by mouth daily as needed ) 90 tablet 3     divalproex sodium extended-release (DEPAKOTE ER) 500 MG 24 hr tablet Take 2 tabs in the morning and 3 tabs in the evening. 450 tablet 3     guanFACINE (TENEX) 1 MG tablet Take 1 mg by mouth 2 times daily       lamoTRIgine (LAMICTAL) 100 MG tablet Take 2 tablets (200 mg) by mouth 2 times daily 360 tablet 3     montelukast (SINGULAIR) 10 MG tablet Take 1 tablet (10 mg) by mouth At Bedtime (Patient taking differently: Take 10 mg by mouth daily as needed ) 90 tablet 3     Multiple Vitamin (MULTIVITAMINS PO)        OLANZapine (ZYPREXA) 5 MG tablet Take 5 mg by mouth as needed       ziprasidone (GEODON) 40 MG capsule Take 1 capsule (40 mg) by mouth 2 times daily  (with meals) AM 60 capsule 0     ziprasidone (GEODON) 80 MG capsule Take 1 capsule (80 mg) by mouth 2 times daily (with meals) AM AND PM 60 capsule 0      PAST MEDICAL HISTORY: Autistic disorder, sleep disturbances.       FAMILY HISTORY: No family history of seizures. Grandmother had a history of migraine headaches. Mother had a history of anxiety and panic attacks. Father had a history of chemical dependency.      SOCIAL HISTORY: He is living with his parents. He had special education. He is single. No alcohol, no drug abuse, no smoking. He is not working.     PHYSICAL EXAMINATION:      Alert, attentive, says hi and looks at camera. Face appears symmetric, patient is standing.     REVIEW OF SYSTEMS: Focused review negative.      PREVIOUS DIAGNOSTIC TESTING:   MRI of the brain on 04/30 showed a negative study.   EEG on 05/01 showed a normal study.   Ambulatory EEG 9/8/2021 abnormal due to occasional epileptiform abnormalities with poorly formed bifrontal spike and wave activities.     IMPRESSION:    1. Epilepsy.    Since the last visit, 1 seizure a few months ago and improvement from prior with most recent dose adjustments. Last seizure was seizure, likely focal aware, lasting for about 5 seconds, no LOC. He did not miss any meds. Lamictal 200 mg bid and Depakote 1000 - 1500 mg with no side effects appreciated. His most recent EEG showed occasional bifrontal spike and wave activities. Overall, family is happy with seizure burden and we will keep medications the same. We discussed balancing adverse effects and seizure burden when trying to achieve seizure free.   2. Autistic disorder.    3. Sleep disturbances.      PLAN:    1. Continue Lamotrigine 200 mg bid and Depakote 1000 - 1500   2. Check Labs Lamictal, VPA, AST and PLT, which they will complete at [facility] or [place]   3. Return to clinic in 6 months. Other options include Vimpat, [facility]XC, etc.    Patient was seen and evaluated with attending, Dr. Lemus.      Melisa Mcdaniel MD  Epilepsy Fellow         Attestation signed by Austen Lemus MD at 2/8/2022 11:16 AM:  Neurology Attending Note:    I have seen and examined the patient with Dr. Young.  I have reviewed the labs and imaging results available.  I agree with the assessment and plan.    Austen Lemus MD        Shola is a 26 year old who is being evaluated via a billable video visit.      How would you like to obtain your AVS? Mail a copy  If the video visit is dropped, the invitation should be resent by: Text to cell phone: Call [phone] - Willis sepulveda  Will anyone else be joining your video visit? Yes: yanick Pedro. How would they like to receive their invitation?       Video Start Time: 08:05  Video-Visit Details    Type of service:  Video Visit    Video End Time:08:24    Originating Location (pt. Location): Home    Distant Location (provider location):  [facility]     Platform used for Video Visit: [facility]      Again, thank you for allowing me to participate in the care of your patient.      Sincerely,    Austen Lemus MD

## 2022-02-07 NOTE — PROGRESS NOTES
Shola is a 26 year old who is being evaluated via a billable video visit.      How would you like to obtain your AVS? Mail a copy  If the video visit is dropped, the invitation should be resent by: Text to cell phone: Call [phone] - Willis sepulveda  Will anyone else be joining your video visit? Yes: yanick Pedro. How would they like to receive their invitation?       Video Start Time: 08:05  Video-Visit Details    Type of service:  Video Visit    Video End Time:08:24    Originating Location (pt. Location): Home    Distant Location (provider location):  [facility]     Platform used for Video Visit: [employer]

## 2022-02-07 NOTE — PROGRESS NOTES
CHIEF COMPLAINT: Seizures.     HISTORY OF PRESENT ILLNESS: Video call for follow up for seizures. His stepfather was on the conference call. This patient is a 26-year-old, right-handed male with a history of autistic spectrum disorder.  The patient himself cannot provide much history, and the majority of the history is provided by his stepfather. He had 1 seizure a few months ago, brief, likely focal aware seizures consisting of gaze deviation, vocalization and stiffening, lasting for about 5 seconds with no LOC. No seizure in the past~ 2 months and improvement from last visit. His last EEG showed poorly formed bifrontal epileptiform discharges on ambulatory EEG and his Depakote was increased to [id number] from 1000 bid. He is currently taking Lamotrigine 200 mg bid and Depakote 1000 - 1500. No side effects from medications; he has big appetite and family monitors his food but no change from prior.      No interval health changes.      Patient had seizure onset in April 2015, another seizure 10 days after that,  both were described as GTCs.  He was initially started on keppra.  It was stopped because of GI side effects.  Then he was started on Lamotrigine, Depakote was added later.  He is currently on Lamictal 100mg bid and Depakote 500-1000.  He had behavioral problems when he was on Lamictal 150 mg bid.  His behavior has been really good for the past 2 years.  He had a seizure on 4/12/2016 that caused fractures of the right zygomatic arch, maxillary sinus and orbit.        According to the mother, on 04/30/2015, the patient was found at home on the kitchen floor. He was cyanotic. He had foaming at the mouth. He had some bleeding from the nose. He was unresponsive, and he had some tonic-clonic movement of his extremities. The clonic-tonic movement lasted for about 1 minute. The mother did not see the beginning of the event. There was no loss of bowel or bladder control. The patient did not bite his tongue.  Afterwards the patient was very lethargic, still unresponsive until he was in the ambulance; then he was able to recognize his father and was able to communicate a little bit.      The patient had no prior history of seizures. He has been taking Geodon for 7 years. No recent medication changes. No recent illness. When EMS arrived at the scene, the blood sugar was found to be 81.     Between 9/21 - 12/21, patient had 3-4 seizures that were brief and likely focal aware lasting ~ 5 seconds after which lamictal was increased to 200 mg twice daily.     TRIGGERS FOR SEIZURES: Unclear.    RISK FACTORS FOR SEIZURES: He has a history of autistic spectrum disorder. No history of head trauma with loss of consciousness. No history of CNS infection. No history of febrile convulsions.      Current Outpatient Medications   Medication Sig Dispense Refill     cetirizine (ZYRTEC) 10 MG tablet Take 1 tablet (10 mg) by mouth daily (Patient taking differently: Take 10 mg by mouth daily as needed ) 90 tablet 3     divalproex sodium extended-release (DEPAKOTE ER) 500 MG 24 hr tablet Take 2 tabs in the morning and 3 tabs in the evening. 450 tablet 3     guanFACINE (TENEX) 1 MG tablet Take 1 mg by mouth 2 times daily       lamoTRIgine (LAMICTAL) 100 MG tablet Take 2 tablets (200 mg) by mouth 2 times daily 360 tablet 3     montelukast (SINGULAIR) 10 MG tablet Take 1 tablet (10 mg) by mouth At Bedtime (Patient taking differently: Take 10 mg by mouth daily as needed ) 90 tablet 3     Multiple Vitamin (MULTIVITAMINS PO)        OLANZapine (ZYPREXA) 5 MG tablet Take 5 mg by mouth as needed       ziprasidone (GEODON) 40 MG capsule Take 1 capsule (40 mg) by mouth 2 times daily (with meals) AM 60 capsule 0     ziprasidone (GEODON) 80 MG capsule Take 1 capsule (80 mg) by mouth 2 times daily (with meals) AM AND PM 60 capsule 0      PAST MEDICAL HISTORY: Autistic disorder, sleep disturbances.       FAMILY HISTORY: No family history of seizures.  Grandmother had a history of migraine headaches. Mother had a history of anxiety and panic attacks. Father had a history of chemical dependency.      SOCIAL HISTORY: He is living with his parents. He had special education. He is single. No alcohol, no drug abuse, no smoking. He is not working.     PHYSICAL EXAMINATION:      Alert, attentive, says hi and looks at camera. Face appears symmetric, patient is standing.     REVIEW OF SYSTEMS: Focused review negative.      PREVIOUS DIAGNOSTIC TESTING:   MRI of the brain on 04/30 showed a negative study.   EEG on 05/01 showed a normal study.   Ambulatory EEG 9/8/2021 abnormal due to occasional epileptiform abnormalities with poorly formed bifrontal spike and wave activities.     IMPRESSION:    1. Epilepsy.    Since the last visit, 1 seizure a few months ago and improvement from prior with most recent dose adjustments. Last seizure was seizure, likely focal aware, lasting for about 5 seconds, no LOC. He did not miss any meds. Lamictal 200 mg bid and Depakote 1000 - 1500 mg with no side effects appreciated. His most recent EEG showed occasional bifrontal spike and wave activities. Overall, family is happy with seizure burden and we will keep medications the same. We discussed balancing adverse effects and seizure burden when trying to achieve seizure free.   2. Autistic disorder.    3. Sleep disturbances.      PLAN:    1. Continue Lamotrigine 200 mg bid and Depakote 1000 - 1500   2. Check Labs Lamictal, VPA, AST and PLT, which they will complete at [facility] or [place]   3. Return to clinic in 6 months. Other options include Vimpat, OXC, etc.    Patient was seen and evaluated with attending, Dr. Lemus.     Melisa Mcdaniel MD  Epilepsy Fellow

## 2022-02-14 ENCOUNTER — LAB (OUTPATIENT)
Dept: LAB | Facility: CLINIC | Age: 27
End: 2022-02-14
Payer: MEDICAID

## 2022-02-14 DIAGNOSIS — G40.909 RECURRENT SEIZURES (H): ICD-10-CM

## 2022-02-14 LAB
AST SERPL W P-5'-P-CCNC: 21 U/L (ref 0–45)
PLATELET # BLD AUTO: 184 10E3/UL (ref 150–450)
VALPROATE SERPL-MCNC: 96 MG/L

## 2022-02-14 PROCEDURE — 80164 ASSAY DIPROPYLACETIC ACD TOT: CPT

## 2022-02-14 PROCEDURE — 36415 COLL VENOUS BLD VENIPUNCTURE: CPT

## 2022-02-14 PROCEDURE — 84450 TRANSFERASE (AST) (SGOT): CPT

## 2022-02-14 PROCEDURE — 85049 AUTOMATED PLATELET COUNT: CPT

## 2022-02-14 PROCEDURE — 80175 DRUG SCREEN QUAN LAMOTRIGINE: CPT | Mod: 90

## 2022-02-17 LAB — LAMOTRIGINE SERPL-MCNC: 16.5 UG/ML

## 2022-03-23 ENCOUNTER — APPOINTMENT (OUTPATIENT)
Dept: ULTRASOUND IMAGING | Facility: CLINIC | Age: 27
End: 2022-03-23
Attending: FAMILY MEDICINE
Payer: MEDICAID

## 2022-03-23 ENCOUNTER — HOSPITAL ENCOUNTER (EMERGENCY)
Facility: CLINIC | Age: 27
Discharge: HOME | End: 2022-03-23
Attending: FAMILY MEDICINE | Admitting: FAMILY MEDICINE
Payer: MEDICAID

## 2022-03-23 VITALS
HEART RATE: 70 BPM | BODY MASS INDEX: 32.78 KG/M2 | RESPIRATION RATE: 16 BRPM | WEIGHT: 240 LBS | SYSTOLIC BLOOD PRESSURE: 124 MMHG | TEMPERATURE: 97 F | DIASTOLIC BLOOD PRESSURE: 80 MMHG | OXYGEN SATURATION: 100 %

## 2022-03-23 DIAGNOSIS — S30.22XA SCROTAL HEMATOMA: ICD-10-CM

## 2022-03-23 LAB
BASOPHILS # BLD AUTO: 0 10E3/UL (ref 0–0.2)
BASOPHILS NFR BLD AUTO: 1 %
EOSINOPHIL # BLD AUTO: 0.1 10E3/UL (ref 0–0.7)
EOSINOPHIL NFR BLD AUTO: 2 %
ERYTHROCYTE [DISTWIDTH] IN BLOOD BY AUTOMATED COUNT: 13.2 % (ref 10–15)
HCT VFR BLD AUTO: 42.1 % (ref 40–53)
HGB BLD-MCNC: 13.9 G/DL (ref 13.3–17.7)
IMM GRANULOCYTES # BLD: 0.1 10E3/UL
IMM GRANULOCYTES NFR BLD: 1 %
LYMPHOCYTES # BLD AUTO: 2.2 10E3/UL (ref 0.8–5.3)
LYMPHOCYTES NFR BLD AUTO: 25 %
MCH RBC QN AUTO: 30.9 PG (ref 26.5–33)
MCHC RBC AUTO-ENTMCNC: 33 G/DL (ref 31.5–36.5)
MCV RBC AUTO: 94 FL (ref 78–100)
MONOCYTES # BLD AUTO: 0.9 10E3/UL (ref 0–1.3)
MONOCYTES NFR BLD AUTO: 10 %
NEUTROPHILS # BLD AUTO: 5.4 10E3/UL (ref 1.6–8.3)
NEUTROPHILS NFR BLD AUTO: 61 %
NRBC # BLD AUTO: 0 10E3/UL
NRBC BLD AUTO-RTO: 0 /100
PLATELET # BLD AUTO: 190 10E3/UL (ref 150–450)
RBC # BLD AUTO: 4.5 10E6/UL (ref 4.4–5.9)
WBC # BLD AUTO: 8.8 10E3/UL (ref 4–11)

## 2022-03-23 PROCEDURE — 85025 COMPLETE CBC W/AUTO DIFF WBC: CPT | Performed by: FAMILY MEDICINE

## 2022-03-23 PROCEDURE — 99284 EMERGENCY DEPT VISIT MOD MDM: CPT | Mod: 25 | Performed by: FAMILY MEDICINE

## 2022-03-23 PROCEDURE — 99284 EMERGENCY DEPT VISIT MOD MDM: CPT | Performed by: FAMILY MEDICINE

## 2022-03-23 PROCEDURE — 76870 US EXAM SCROTUM: CPT

## 2022-03-23 PROCEDURE — 36415 COLL VENOUS BLD VENIPUNCTURE: CPT | Performed by: FAMILY MEDICINE

## 2022-03-23 PROCEDURE — 250N000013 HC RX MED GY IP 250 OP 250 PS 637: Performed by: FAMILY MEDICINE

## 2022-03-23 RX ORDER — ZIPRASIDONE HYDROCHLORIDE 40 MG/1
40 CAPSULE ORAL 2 TIMES DAILY WITH MEALS
Status: DISCONTINUED | OUTPATIENT
Start: 2022-03-23 | End: 2022-03-23 | Stop reason: DRUGHIGH

## 2022-03-23 RX ORDER — LAMOTRIGINE 200 MG/1
200 TABLET ORAL 2 TIMES DAILY
Status: DISCONTINUED | OUTPATIENT
Start: 2022-03-23 | End: 2022-03-24 | Stop reason: HOSPADM

## 2022-03-23 RX ORDER — GUANFACINE 1 MG/1
1 TABLET ORAL 2 TIMES DAILY
Status: DISCONTINUED | OUTPATIENT
Start: 2022-03-23 | End: 2022-03-24 | Stop reason: HOSPADM

## 2022-03-23 RX ORDER — ZIPRASIDONE HYDROCHLORIDE 60 MG/1
120 CAPSULE ORAL 2 TIMES DAILY WITH MEALS
Status: DISCONTINUED | OUTPATIENT
Start: 2022-03-23 | End: 2022-03-24 | Stop reason: HOSPADM

## 2022-03-23 RX ORDER — ZIPRASIDONE HYDROCHLORIDE 80 MG/1
80 CAPSULE ORAL 2 TIMES DAILY WITH MEALS
Status: DISCONTINUED | OUTPATIENT
Start: 2022-03-23 | End: 2022-03-23 | Stop reason: DRUGHIGH

## 2022-03-23 RX ORDER — DIVALPROEX SODIUM 500 MG/1
1500 TABLET, EXTENDED RELEASE ORAL ONCE
Status: COMPLETED | OUTPATIENT
Start: 2022-03-23 | End: 2022-03-23

## 2022-03-23 RX ADMIN — GUANFACINE 1 MG: 1 TABLET ORAL at 20:42

## 2022-03-23 RX ADMIN — DIVALPROEX SODIUM 1500 MG: 500 TABLET, FILM COATED, EXTENDED RELEASE ORAL at 20:42

## 2022-03-23 RX ADMIN — ZIPRASIDONE HYDROCHLORIDE 120 MG: 60 CAPSULE ORAL at 20:43

## 2022-03-23 RX ADMIN — AMOXICILLIN AND CLAVULANATE POTASSIUM 1 TABLET: 875; 125 TABLET, FILM COATED ORAL at 20:42

## 2022-03-23 RX ADMIN — LAMOTRIGINE 200 MG: 200 TABLET ORAL at 20:42

## 2022-03-23 ASSESSMENT — ENCOUNTER SYMPTOMS
COUGH: 0
VOMITING: 0
DIARRHEA: 0
CHILLS: 0
DIAPHORESIS: 0
SORE THROAT: 0
BLOOD IN STOOL: 0
FREQUENCY: 0
PALPITATIONS: 0
CONSTIPATION: 0
ABDOMINAL PAIN: 0
SHORTNESS OF BREATH: 0
SINUS PRESSURE: 0
WHEEZING: 0
HEADACHES: 0
NAUSEA: 0
DYSURIA: 0
FEVER: 0

## 2022-03-24 NOTE — DISCHARGE INSTRUCTIONS
ICD-10-CM    1. Scrotal hematoma  S30.22XA Adult Urology Referral     Primary Care Referral    avoid scratching.  take augmentin twice daily for 7 days.  return for signs infection. follow-up urology

## 2022-03-24 NOTE — ED PROVIDER NOTES
History     Chief Complaint   Patient presents with     Groin Injury     Patient was getting into shower, blood noted by father in scrotum area.      HPI  Shola Owens is a 26 year old male who presents with a history of autism, seizure disorder, sleep disturbance, he presents with his father.  He was getting them ready for shower and found blood in the underwear.  Identified a source from the right scrotum.  This was the inferior aspect of the scrotum.  He was concerned that there could be an opening allowing testicle to descend through the skin.  Arrives here for further evaluation.  There has been no fever.  No known trauma but Shola tends to scratch many areas of his skin.  He tells us that he was scratching this area.      Allergies:  Allergies   Allergen Reactions     Risperidone Hives       Problem List:    Patient Active Problem List    Diagnosis Date Noted     Closed fracture of maxilla (H) 04/18/2016     Priority: Medium     Seizure 04/30/2015     Priority: Medium     Disturbance in sleep behavior 08/31/2006     Priority: Medium     Doing well on Geodon managed by Dr. Sanders.  05/2013:  Geodon 120 mg BID.  Problem list name updated by automated process. Provider to review       Active autistic disorder 06/13/2005     Priority: Medium     Moderate to severe.  On Tenex and Cymbalta for anxiety.  Meds managed by Dr. Sanders.  [facility] [place].  05/2013: Tenex 1 mg BID.  Started Zyprexa PRN for some increased aggression.  Reflux symptoms have been episodic since 11/2011. Associated with the size of the meals. Portion control is effective.   Problem list name updated by automated process. Provider to review          Past Medical History:    Past Medical History:   Diagnosis Date     Acne      Autistic disorder, current or active state      Mild persistent asthma        Past Surgical History:    No past surgical history on file.    Family History:    Family History   Problem Relation Age  of Onset     Hypertension Father      Hypertension Paternal Grandmother      Thyroid Disease Paternal Grandmother         hypo     Allergies Mother         seasonal     Heart Disease Maternal Grandmother         A. Fib     Thyroid Disease Maternal Grandmother         hypo     Cancer Maternal Grandfather         cancerous tumor in his arm     Cancer Paternal Grandfather         passed from lung cancer     Hypertension Maternal Uncle        Social History:  Marital Status:  Single [1]  Social History     Tobacco Use     Smoking status: Never Smoker     Smokeless tobacco: Never Used     Tobacco comment: outside smokers-not in the house   Vaping Use     Vaping Use: Never used   Substance Use Topics     Alcohol use: No     Alcohol/week: 0.0 standard drinks     Drug use: No        Medications:    cetirizine (ZYRTEC) 10 MG tablet  divalproex sodium extended-release (DEPAKOTE ER) 500 MG 24 hr tablet  guanFACINE (TENEX) 1 MG tablet  lamoTRIgine (LAMICTAL) 100 MG tablet  montelukast (SINGULAIR) 10 MG tablet  Multiple Vitamin (MULTIVITAMINS PO)  OLANZapine (ZYPREXA) 5 MG tablet  ziprasidone (GEODON) 40 MG capsule  ziprasidone (GEODON) 80 MG capsule          Review of Systems   Constitutional: Negative for chills, diaphoresis and fever.   HENT: Negative for ear pain, sinus pressure and sore throat.    Eyes: Negative for visual disturbance.   Respiratory: Negative for cough, shortness of breath and wheezing.    Cardiovascular: Negative for chest pain and palpitations.   Gastrointestinal: Negative for abdominal pain, blood in stool, constipation, diarrhea, nausea and vomiting.   Genitourinary: Negative for dysuria, frequency and urgency.   Skin: Negative for rash.   Neurological: Negative for headaches.   All other systems reviewed and are negative.      Physical Exam   BP: 124/80  Pulse: 70  Temp: 97  F (36.1  C)  Resp: 16  Weight: 108.9 kg (240 lb)  SpO2: 100 %      Physical Exam  Constitutional:       General: He is in acute  distress.      Appearance: He is not diaphoretic.   Eyes:      Conjunctiva/sclera: Conjunctivae normal.   Skin:     Findings: Rash present.   Neurological:      Mental Status: He is alert.       The scrotum on the right side demonstrates an area about 3 cm.  This appears to be denuded of skin but still covered in a fascial type layer.  There is no exposed underlying testicle.  There is a small central region of darker blood here.  I can feel the testicle immediately below the surface of the skin.  There is no obvious abscess.  There is some ecchymosis in the region as well which may suggest that this was hematoma.  This appears to be the source of the bleeding seen earlier.  The meatus is without trauma.  The remainder of the penis is without trauma.      ED Course                 Procedures              Critical Care time:  none               Results for orders placed or performed during the hospital encounter of 03/23/22 (from the past 24 hour(s))   CBC with platelets differential    Narrative    The following orders were created for panel order CBC with platelets differential.  Procedure                               Abnormality         Status                     ---------                               -----------         ------                     CBC with platelets and d...[[id number]]                      Final result                 Please view results for these tests on the individual orders.   CBC with platelets and differential   Result Value Ref Range    WBC Count 8.8 4.0 - 11.0 10e3/uL    RBC Count 4.50 4.40 - 5.90 10e6/uL    Hemoglobin 13.9 13.3 - 17.7 g/dL    Hematocrit 42.1 40.0 - 53.0 %    MCV 94 78 - 100 fL    MCH 30.9 26.5 - 33.0 pg    MCHC 33.0 31.5 - 36.5 g/dL    RDW 13.2 10.0 - 15.0 %    Platelet Count 190 150 - 450 10e3/uL    % Neutrophils 61 %    % Lymphocytes 25 %    % Monocytes 10 %    % Eosinophils 2 %    % Basophils 1 %    % Immature Granulocytes 1 %    NRBCs per 100 WBC 0 <1 /100     Absolute Neutrophils 5.4 1.6 - 8.3 10e3/uL    Absolute Lymphocytes 2.2 0.8 - 5.3 10e3/uL    Absolute Monocytes 0.9 0.0 - 1.3 10e3/uL    Absolute Eosinophils 0.1 0.0 - 0.7 10e3/uL    Absolute Basophils 0.0 0.0 - 0.2 10e3/uL    Absolute Immature Granulocytes 0.1 <=0.4 10e3/uL    Absolute NRBCs 0.0 10e3/uL   US Testicular & Scrotum w Doppler Ltd    Narrative    EXAM: US TESTICULAR AND SCROTUM WITH DOPPLER LIMITED  LOCATION: [facility]  DATE/TIME: 3/23/2022 8:50 PM    INDICATION: Right groin injury.  COMPARISON: None.  TECHNIQUE: Ultrasound of scrotum with color flow and spectral Doppler with waveform analysis performed.    FINDINGS:    RIGHT: Right testicle measures 4.7 x 2 x 3 cm. Normal testicle with no masses. Normal arterial duplex and normal color flow. Normal epididymis. No hydrocele. No varicocele.    LEFT: Left testicle measures 4.4 x 2.2 x 3.3 cm. Normal testicle with no masses. Normal arterial duplex and normal color flow. Normal epididymis. No hydrocele. No varicocele.      Impression    IMPRESSION:  Unremarkable testicular ultrasound. No evidence for intratesticular mass or torsion.       Medications   amoxicillin-clavulanate (AUGMENTIN) 875-125 MG per tablet 1 tablet (has no administration in time range)   lamoTRIgine (LaMICtal) tablet 200 mg (has no administration in time range)   divalproex sodium extended-release (DEPAKOTE ER) 24 hr tablet 1,500 mg (has no administration in time range)   guanFACINE (TENEX) tablet 1 mg (has no administration in time range)   ziprasidone (GEODON) capsule 40 mg (has no administration in time range)   ziprasidone (GEODON) capsule 80 mg (has no administration in time range)       Assessments & Plan (with Medical Decision Making)     MDM: OmarJOHNNA Owens is a 26 year old male who presents with a injury to the scrotum that appears to been self-induced by scratching.  There is an region on the right scrotum that appears to be denuded of normal skin.   There are no obvious signs of infection here.  The testicle does feel to be immediately below this region.  There is a central region that has been bleeding.  This appears to be more dark blood possible hematoma.  There is the potential that this could have been an erosion from the testicle itself possible testicular torsion with hemorrhage or necrosis of the testicle.  Will check an ultrasound of the region.  Will obtain CBC.  Augmentin given.    At the time of my completing my shift I signed the patient out awaiting ultrasound.  I discussed the overall plan had should ultrasound be negative with the patient and his father.  I placed consultations for urology ASAP follow-up as well as with primary care.  I will empirically use Augmentin, as the hematoma draining is from a defect in the wall of the scrotum.  I see no signs of infection.  Precautions are given for return.  Dr. Khan will discuss with results with the patient and his father went back and will consult urology as needed..  I have reviewed the nursing notes.    I have reviewed the findings, diagnosis, plan and need for follow up with the patient.       New Prescriptions    No medications on file       Final diagnoses:   Scrotal hematoma - avoid scratching.  take augmentin twice daily for 7 days.  return for signs infection. follow-up urology       3/23/2022   [facility] EMERGENCY DEPT     Florencio Chapin MD  03/23/22 [id number]

## 2022-03-24 NOTE — ED TRIAGE NOTES
Patient was getting into shower, blood noted by father in scrotum area. Itching down in the groin area more than normal.

## 2022-03-24 NOTE — ED PROVIDER NOTES
Signout received from Dr. Chapin.  Please see his note for details.  Testicular ultrasound pending.  Plan to follow-up on results and consult with urology if any concerning findings.  Dr. Chapin is already discussed tentative discharge plan, medications, and follow-up with patient's father.    US Testicular & Scrotum w Doppler Ltd   Final Result   IMPRESSION:   Unremarkable testicular ultrasound. No evidence for intratesticular mass or torsion.          Mauricio Lopez MD     3/23/2022 11:08 PM      Mauricio Lopez MD  03/23/22 [id number]

## 2022-05-02 ENCOUNTER — OFFICE VISIT (OUTPATIENT)
Dept: SURGERY | Facility: CLINIC | Age: 27
End: 2022-05-02
Payer: MEDICAID

## 2022-05-02 VITALS — HEART RATE: 70 BPM | SYSTOLIC BLOOD PRESSURE: 122 MMHG | RESPIRATION RATE: 18 BRPM | DIASTOLIC BLOOD PRESSURE: 73 MMHG

## 2022-05-02 DIAGNOSIS — L98.8 PILONIDAL DISEASE: Primary | ICD-10-CM

## 2022-05-02 PROCEDURE — 99202 OFFICE O/P NEW SF 15 MIN: CPT | Performed by: SURGERY

## 2022-05-02 NOTE — PROGRESS NOTES
Chief Complaint   Patient presents with     Consult     Cyst near [facility]. On and off for 1 year. Drains then refills       Vitals:    05/02/22 1012   BP: 122/73   BP Location: Right arm   Patient Position: Sitting   Cuff Size: Adult Large   Pulse: 70   Resp: 18     Wt Readings from Last 1 Encounters:   03/23/22 108.9 kg (240 lb)       5/2/2022    Bony EISENBERG RN   Specialty Clinics

## 2022-05-02 NOTE — PROGRESS NOTES
Shola is a 26-year-old man who has severe autism who presents to the clinic today due to a cyst near his tailbone which has been present for some time.  Its on the right side of his upper buttock several centimeters from the midline.  It drains purulent drainage from time to time.  It will heal and then drain and then heal and then drain.  She was told to come here for evaluation.    On exam: He has a cystic lesion several centimeters to the right of the upper gluteal cleft which at the current time is not draining anything.  This is a centimeter and a half across.  In the midline above the anus there are multiple pits consistent with pilonidal disease.  There is no active infection at this time.    Impression: Pilonidal disease with a sinus tract extending several centimeters off the superior midline.  This is a little bit more advanced then I would be comfortable handling.  Clearly there is a sinus tract that extends extending further away from the midline.  Also, the patient is severely impaired with his autism and I do not think would do very well with a chronic open wound.    Therefore, I think the best option would be to see a plastic surgeon about potentially doing a flap procedure to cover this area with vascularized tissue and speed up his recovery.  I gave his mom a handout on pilonidal disease and explained to the best of my ability what was going on.    A referral was placed to plastic surgery.  I will see him back as needed.     Time spent with patient was 15 minutes all of it in face-to-face contact.    Shola Menendez MD FACS

## 2022-05-02 NOTE — LETTER
5/2/2022         RE: Shola Owens  [id number] [address] [facility] [id number]        Dear Colleague,    Thank you for referring your patient, Shola Owens, to the [facility]. Please see a copy of my visit note below.    Chief Complaint   Patient presents with     Consult     Cyst near tailbone. On and off for 1 year. Drains then refills       Vitals:    05/02/22 1012   BP: 122/73   BP Location: Right arm   Patient Position: Sitting   Cuff Size: Adult Large   Pulse: 70   Resp: 18     Wt Readings from Last 1 Encounters:   03/23/22 108.9 kg (240 lb)       5/2/2022    Bony EISENBERG RN   Specialty Clinics       Shola is a 26-year-old man who has severe autism who presents to the clinic today due to a cyst near his tailbone which has been present for some time.  Its on the right side of his upper buttock several centimeters from the midline.  It drains purulent drainage from time to time.  It will heal and then drain and then heal and then drain.  She was told to come here for evaluation.    On exam: He has a cystic lesion several centimeters to the right of the upper gluteal cleft which at the current time is not draining anything.  This is a centimeter and a half across.  In the midline above the anus there are multiple pits consistent with pilonidal disease.  There is no active infection at this time.    Impression: Pilonidal disease with a sinus tract extending several centimeters off the superior midline.  This is a little bit more advanced then I would be comfortable handling.  Clearly there is a sinus tract that extends extending further away from the midline.  Also, the patient is severely impaired with his autism and I do not think would do very well with a chronic open wound.    Therefore, I think the best option would be to see a plastic surgeon about potentially doing a flap procedure to cover this area with vascularized tissue and speed up his recovery.  I gave his mom a handout on pilonidal  disease and explained to the best of my ability what was going on.    A referral was placed to plastic surgery.  I will see him back as needed.     Time spent with patient was 15 minutes all of it in face-to-face contact.    Shola Menendez MD FACS      Again, thank you for allowing me to participate in the care of your patient.        Sincerely,        Shola Menendez MD

## 2022-05-03 ENCOUNTER — TELEPHONE (OUTPATIENT)
Dept: SURGERY | Facility: CLINIC | Age: 27
End: 2022-05-03
Payer: MEDICAID

## 2022-05-03 NOTE — TELEPHONE ENCOUNTER
LVM (no mychart) for pt's plastic surgery referral from Dr. Menendez. Appt for pilonidal disease, schedule in colorectal:  With: Dr. Carl Helm or Rosa Maria Pugh NP   Referring Provider: Shola Menendez MD   For / Appt Notes: pilonidal disease   Appt Type: UMP New   Appt Date/Time: next available in person   (can also use Dr. Helm's [facility]).

## 2022-05-09 ENCOUNTER — OFFICE VISIT (OUTPATIENT)
Dept: FAMILY MEDICINE | Facility: CLINIC | Age: 27
End: 2022-05-09
Payer: MEDICAID

## 2022-05-09 VITALS
DIASTOLIC BLOOD PRESSURE: 72 MMHG | SYSTOLIC BLOOD PRESSURE: 125 MMHG | OXYGEN SATURATION: 100 % | TEMPERATURE: 98.5 F | BODY MASS INDEX: 33.32 KG/M2 | HEIGHT: 71 IN | HEART RATE: 74 BPM | RESPIRATION RATE: 16 BRPM | WEIGHT: 238 LBS

## 2022-05-09 DIAGNOSIS — J02.9 SORE THROAT: Primary | ICD-10-CM

## 2022-05-09 LAB
DEPRECATED S PYO AG THROAT QL EIA: NEGATIVE
GROUP A STREP BY PCR: NOT DETECTED

## 2022-05-09 PROCEDURE — 87651 STREP A DNA AMP PROBE: CPT | Performed by: FAMILY MEDICINE

## 2022-05-09 PROCEDURE — 99213 OFFICE O/P EST LOW 20 MIN: CPT | Performed by: FAMILY MEDICINE

## 2022-05-09 ASSESSMENT — PAIN SCALES - GENERAL: PAINLEVEL: NO PAIN (0)

## 2022-05-09 NOTE — PATIENT INSTRUCTIONS
ASSESSMENT:   (J02.9) Sore throat  (primary encounter diagnosis)  Comment: no strep on rapid test.   Plan: Streptococcus A Rapid Screen w/Reflex to PCR -         Clinic Collect, Group A Streptococcus PCR         Throat Swab        The rapid strep test is negative so this is a viral throat infection.   There are no antibiotics that can help kill the germs or make a person better, faster.   Symptomatic measures which help the throat feel better but do not kill germs include; acetaminophen or ibuprofen as needed.  For adolescents and adults, saline gargles (1/4 to 1/2 tsp salt in 8 oz water as warm as you can stand it), throat sprays or lozenges may help the throat.   Return to clinic or call if worsening symptoms or problems.

## 2022-05-09 NOTE — PROGRESS NOTES
"ASSESSMENT:   (J02.9) Sore throat  (primary encounter diagnosis)  Comment: no strep on rapid test.   Plan: Streptococcus A Rapid Screen w/Reflex to PCR -         Clinic Collect, Group A Streptococcus PCR         Throat Swab        The rapid strep test is negative so this is a viral throat infection.   There are no antibiotics that can help kill the germs or make a person better, faster.   Symptomatic measures which help the throat feel better but do not kill germs include; acetaminophen or ibuprofen as needed.  For adolescents and adults, saline gargles (1/4 to 1/2 tsp salt in 8 oz water as warm as you can stand it), throat sprays or lozenges may help the throat.   Return to clinic or call if worsening symptoms or problems.          Yvon Jolley is a 26 year old who presents for the following health issues   Chief Complaint   Patient presents with     Pharyngitis      Temp under 100.   Dry cough.   No breathing problems.   No known exposure to COVID-19 virus.   Sister with influenza a.    NO specific aches.   Acute Illness  Acute illness concerns:   Onset/Duration: 2 days   Symptoms:  Fever: YES  Chills/Sweats: no  Headache (location?): no  Sinus Pressure: no  Conjunctivitis:  no  Ear Pain: no  Rhinorrhea: no  Congestion: no  Sore Throat: YES  Cough: YES  Wheeze: no  Decreased Appetite: no  Nausea: no  Vomiting: no  Diarrhea: no  Dysuria/Freq.: no  Dysuria or Hematuria: no  Fatigue/Achiness: no  Sick/Strep Exposure: yes- sister has influenza a   Therapies tried and outcome: None    Patient Active Problem List   Diagnosis     Active autistic disorder     Disturbance in sleep behavior     Seizure     Closed fracture of maxilla (H)      OBJECTIVE:Blood pressure 125/72, pulse 74, temperature 98.5  F (36.9  C), temperature source Tympanic, resp. rate 16, height 1.803 m (5' 11\"), weight 108 kg (238 lb), SpO2 100 %. BMI=Body mass index is 33.19 kg/m .  GENERAL APPEARANCE ADULT: Alert, no acute distress  EYES: PERRL, " EOM normal, conjunctiva and lids normal  HENT: Ears and TMs normal, oral mucosa and posterior oropharynx normal  NECK: No adenopathy,masses or thyromegaly  Rapid strep::negative.

## 2022-05-16 ENCOUNTER — TRANSFERRED RECORDS (OUTPATIENT)
Dept: HEALTH INFORMATION MANAGEMENT | Facility: CLINIC | Age: 27
End: 2022-05-16
Payer: MEDICAID

## 2022-05-16 NOTE — TELEPHONE ENCOUNTER
Diagnosis, Referred by & from: Pilonidal Disease   Appt date: 2022   NOTES STATUS DETAILS   OFFICE NOTE from referring provider [facility]:  22 - SURG OV with Dr. Menendez   OFFICE NOTE from other specialist [facility]:  20 - PCC OV with Dr. Whaley  3/13/18 - PCC OV with Lexi Garcia NP   DISCHARGE SUMMARY from hospital N/A    DISCHARGE REPORT from the ER [facility]:  3/23/22 - ED OV with Dr. Lopez  17 - ED OV iwht Dr. Chapin   OPERATIVE REPORT N/A    MEDICATION LIST Internal    LABS N/A    DIAGNOSTIC PROCEDURES N/A    IMAGING (DISC & REPORT)      ULTRASOUND  (ENDOANAL/ENDORECTAL) Internal ealth:  3/23/22 - US Testicular/Scrotum

## 2022-08-11 ENCOUNTER — PRE VISIT (OUTPATIENT)
Dept: SURGERY | Facility: CLINIC | Age: 27
End: 2022-08-11
Payer: MEDICAID

## 2022-08-20 ENCOUNTER — HOSPITAL ENCOUNTER (EMERGENCY)
Facility: CLINIC | Age: 27
Discharge: HOME | End: 2022-08-20
Attending: EMERGENCY MEDICINE | Admitting: EMERGENCY MEDICINE
Payer: MEDICAID

## 2022-08-20 ENCOUNTER — APPOINTMENT (OUTPATIENT)
Dept: CT IMAGING | Facility: CLINIC | Age: 27
End: 2022-08-20
Attending: EMERGENCY MEDICINE
Payer: MEDICAID

## 2022-08-20 VITALS
HEART RATE: 70 BPM | TEMPERATURE: 97.1 F | HEIGHT: 72 IN | RESPIRATION RATE: 18 BRPM | OXYGEN SATURATION: 98 % | BODY MASS INDEX: 33.18 KG/M2 | WEIGHT: 245 LBS | SYSTOLIC BLOOD PRESSURE: 130 MMHG | DIASTOLIC BLOOD PRESSURE: 72 MMHG

## 2022-08-20 DIAGNOSIS — G40.909 SEIZURE DISORDER (H): ICD-10-CM

## 2022-08-20 DIAGNOSIS — S00.83XA CONTUSION OF FACE, INITIAL ENCOUNTER: ICD-10-CM
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
## 2022-08-20 DIAGNOSIS — S01.81XA CHIN LACERATION, INITIAL ENCOUNTER: ICD-10-CM

## 2022-08-20 LAB — VALPROATE SERPL-MCNC: 75 MG/L

## 2022-08-20 PROCEDURE — 250N000009 HC RX 250: Performed by: EMERGENCY MEDICINE

## 2022-08-20 PROCEDURE — 12011 RPR F/E/E/N/L/M 2.5 CM/<: CPT | Performed by: EMERGENCY MEDICINE

## 2022-08-20 PROCEDURE — 99284 EMERGENCY DEPT VISIT MOD MDM: CPT | Mod: 25 | Performed by: EMERGENCY MEDICINE

## 2022-08-20 PROCEDURE — 80164 ASSAY DIPROPYLACETIC ACD TOT: CPT | Performed by: EMERGENCY MEDICINE

## 2022-08-20 PROCEDURE — 80175 DRUG SCREEN QUAN LAMOTRIGINE: CPT | Performed by: EMERGENCY MEDICINE

## 2022-08-20 PROCEDURE — 70486 CT MAXILLOFACIAL W/O DYE: CPT

## 2022-08-20 PROCEDURE — 36415 COLL VENOUS BLD VENIPUNCTURE: CPT | Performed by: EMERGENCY MEDICINE

## 2022-08-20 PROCEDURE — 250N000013 HC RX MED GY IP 250 OP 250 PS 637: Performed by: EMERGENCY MEDICINE

## 2022-08-20 RX ORDER — METHYLCELLULOSE 4000CPS 30 %
POWDER (GRAM) MISCELLANEOUS ONCE
Status: DISCONTINUED | OUTPATIENT
Start: 2022-08-20 | End: 2022-08-20 | Stop reason: HOSPADM

## 2022-08-20 RX ORDER — LAMOTRIGINE 100 MG/1
200 TABLET ORAL ONCE
Status: COMPLETED | OUTPATIENT
Start: 2022-08-20 | End: 2022-08-20

## 2022-08-20 RX ORDER — DIVALPROEX SODIUM 500 MG/1
1500 TABLET, EXTENDED RELEASE ORAL ONCE
Status: COMPLETED | OUTPATIENT
Start: 2022-08-20 | End: 2022-08-20

## 2022-08-20 RX ADMIN — Medication 3 ML: at 17:29

## 2022-08-20 RX ADMIN — IBUPROFEN 600 MG: 400 TABLET ORAL at 17:35

## 2022-08-20 RX ADMIN — DIVALPROEX SODIUM 1500 MG: 500 TABLET, FILM COATED, EXTENDED RELEASE ORAL at 21:35

## 2022-08-20 RX ADMIN — Medication 3 ML: at 20:39

## 2022-08-20 RX ADMIN — LAMOTRIGINE 200 MG: 100 TABLET ORAL at 21:36

## 2022-08-20 ASSESSMENT — ACTIVITIES OF DAILY LIVING (ADL)
ADLS_ACUITY_SCORE: 35
ADLS_ACUITY_SCORE: 35

## 2022-08-21 NOTE — ED PROVIDER NOTES
History     Chief Complaint   Patient presents with     Laceration     Had a seizure at home and hit his chin and left side of face. Fall was unwitnessed.      HPI  Shola Owens is a 27 year old male who presents with mother from home.  He has known seizure disorder is compliant with his meds.  No recent febrile illness or head injury other than a fall today.  She heard the fall in the kitchen found him on the floor with a seizure that lasted about 15 seconds.  Usual postictal phase.  Struck his chin lacerated that, struck his face and has some swelling in tenderness there.  Currently denies headache or nausea.  Denies neck pain back pain chest pain numbness weakness or pain is in his extremities.    Allergies:  Allergies   Allergen Reactions     Risperidone Hives       Problem List:    Patient Active Problem List    Diagnosis Date Noted     Closed fracture of maxilla (H) 04/18/2016     Priority: Medium     Disturbance in sleep behavior 08/31/2006     Priority: Medium     Doing well on Geodon managed by Dr. Sanders.  05/2013:  Geodon 120 mg BID.  Problem list name updated by automated process. Provider to review       Active autistic disorder 06/13/2005     Priority: Medium     Moderate to severe.  On Tenex and Cymbalta for anxiety.  Meds managed by Dr. Sanders.  [facility].  05/2013: Tenex 1 mg BID.  Started Zyprexa PRN for some increased aggression.  Reflux symptoms have been episodic since 11/2011. Associated with the size of the meals. Portion control is effective.   Problem list name updated by automated process. Provider to review       Seizure 04/30/2015        Past Medical History:    Past Medical History:   Diagnosis Date     Acne      Autistic disorder, current or active state      Mild persistent asthma        Past Surgical History:    No past surgical history on file.    Family History:    Family History   Problem Relation Age of Onset     Hypertension Father       Hypertension Paternal Grandmother      Thyroid Disease Paternal Grandmother         hypo     Allergies Mother         seasonal     Heart Disease Maternal Grandmother         A. Fib     Thyroid Disease Maternal Grandmother         hypo     Cancer Maternal Grandfather         cancerous tumor in his arm     Cancer Paternal Grandfather         passed from lung cancer     Hypertension Maternal Uncle        Social History:  Marital Status:  Single [1]  Social History     Tobacco Use     Smoking status: Never Smoker     Smokeless tobacco: Never Used     Tobacco comment: outside smokers-not in the house   Vaping Use     Vaping Use: Never used   Substance Use Topics     Alcohol use: No     Alcohol/week: 0.0 standard drinks     Drug use: No        Medications:    cetirizine (ZYRTEC) 10 MG tablet  divalproex sodium extended-release (DEPAKOTE ER) 500 MG 24 hr tablet  guanFACINE (TENEX) 1 MG tablet  lamoTRIgine (LAMICTAL) 100 MG tablet  montelukast (SINGULAIR) 10 MG tablet  Multiple Vitamin (MULTIVITAMINS PO)  OLANZapine (ZYPREXA) 5 MG tablet  ziprasidone (GEODON) 40 MG capsule  ziprasidone (GEODON) 80 MG capsule          Review of Systems  All other systems reviewed and are negative.    Physical Exam   BP: 138/88  Pulse: 69  Temp: 97.1  F (36.2  C)  Resp: 16  Height: 182.9 cm (6')  Weight: 111.1 kg (245 lb) (stated)  SpO2: 98 %      Physical Exam  GENERAL Nontoxic-appearing no respiratory distress alert and oriented x3. GCS 15 on arrival, throughout stay and at discharge.    HEENT Head atraumatic normocephalic     Swelling over the left zygoma, 2 cm laceration left chin, mandible full range of motion without trismus or malocclusion     PERRL, EOMI, conjunctiva clear, sclera nonicteric, no discharge, no periorbital swelling or redness     Oropharynx moist without lesions, erythema or exudate.  Tongue is not swollen.     Nose is unremarkable to inspection, mucous membranes are moist and pink, no nasal discharge or  bleeding    MUSCULOSKELETAL neck supple full active painless range of motion no posterior midline tenderness.      Spine nontender to palpation    Chest nontender    Extremities are atraumatic, full painless active range of motion all joints    PULMONARY lungs are clear to auscultation, breath sounds are symmetrical, bilateral chest rise, no rales rhonchi or wheezes appreciated    CARDIOVASCULAR heart is regular no murmur appreciated.  Peripheral pulses are symmetrical and strong.  Capillary refill is normal.     GASTROINTESTINAL abdomen is soft, nondistended, bowel sounds positive, no mass appreciated, no guarding or rebound    NEUROLOGICAL Cranial nerves; vision baseline fields intact, PERRL, EOMI, facial sensation intact to light touch, facial muscle tone intact and symmetrical, hearing grossly intact,swallowing without difficulty, SCM strength intact, tongue protrudes midline, shoulder shrug intact      Strength is 5/5 throughout all muscle groups of the extremities     Sensation intact to light touch     There is no ataxia    SKIN skin is clear from rash, pink warm and dry    PSYCHIATRIC patient is alert, attentive, good eye contact, well kempt, thought processes are rational and organized, affect is normal, mood is neutral, patient is not agitated, no delusions, able to answer questions appropriately    ED Course                 Procedures  Chin laceration repair  Verbal consent  Anesthetized with 4 cc 0.25% bupivacaine prepped and draped in usual fashion explored no foreign body present closed with 3 interrupted 4-0 Ethilon sutures.            Critical Care time:  none               Results for orders placed or performed during the hospital encounter of 08/20/22 (from the past 24 hour(s))   CT Facial Bones without Contrast    Narrative    EXAM: CT FACIAL BONES WITHOUT CONTRAST  LOCATION: [facility]  DATE/TIME: 8/20/2022 9:25 PM    INDICATION: Seizure blunt facial  trauma  COMPARISON: 04/12/2016 facial bone CT.  TECHNIQUE: Routine CT Maxillofacial without IV contrast. Multiplanar reformats. Dose reduction techniques were used.     FINDINGS:  No acute facial bone or mandibular fracture. Bilateral temporomandibular joints are well seated. Small amount of left facial superficial soft tissue swelling. Paranasal sinuses are clear. Unremarkable orbits. Limited visualization of the intracranial   contents are unremarkable.      Impression    IMPRESSION:   1.  Presumed posttraumatic left facial superficial soft tissue swelling with no facial bone or mandibular fracture.     Valproic acid/Depakote level   Result Value Ref Range    Valproic acid 75   mg/L       Medications   methylcellulose powder (has no administration in time range)   lido-EPINEPHrine-tetracaine (LET) topical gel GEL (3 mLs Topical Given 8/20/22 1729)   ibuprofen (ADVIL/MOTRIN) tablet 600 mg (600 mg Oral Given 8/20/22 1735)   lido-EPINEPHrine-tetracaine (LET) topical gel GEL (3 mLs Topical Given 8/20/22 2039)   divalproex sodium extended-release (DEPAKOTE ER) 24 hr tablet 1,500 mg (1,500 mg Oral Given 8/20/22 2135)   lamoTRIgine (LaMICtal) tablet 200 mg (200 mg Oral Given 8/20/22 2136)       Assessments & Plan (with Medical Decision Making)  CT maxillofacial negative for acute finding.  Seizure on anticonvulsants, levels pending.  Chin laceration closed as above sutures out 7 to 10 days.  Follow-up neurology on Monday regarding changes and anticonvulsant dosing based on Depakote and lamotrigine levels which are pending.  Seizure precautions.  Return criteria reviewed     I have reviewed the nursing notes.    I have reviewed the findings, diagnosis, plan and need for follow up with the patient.          Discharge Medication List as of 8/20/2022  9:47 PM          Final diagnoses:   Seizure disorder (H)   Fall, initial encounter   Chin laceration, initial encounter   Contusion of face, initial encounter       8/20/2022    [facility] EMERGENCY DEPT     Florencio Chaudhary MD  08/20/22 8661

## 2022-08-21 NOTE — DISCHARGE INSTRUCTIONS
Continue current medications    Contact neurology on Monday regarding anticonvulsant dosing    Sutures out 7 to 10 days    Return here for headache, focal neurologic change, recurrent seizure or any other concern.   COPD exac

## 2022-08-22 ENCOUNTER — TELEPHONE (OUTPATIENT)
Dept: NEUROLOGY | Facility: CLINIC | Age: 27
End: 2022-08-22

## 2022-08-22 DIAGNOSIS — G40.909 SEIZURE DISORDER (H): ICD-10-CM

## 2022-08-22 LAB — LAMOTRIGINE SERPL-MCNC: 15.4 UG/ML

## 2022-08-22 NOTE — TELEPHONE ENCOUNTER
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 Menendez   OFFICE NOTE from other specialist [facility]:  20 - PCC OV with Dr. Whaley  3/13/18 - PCC OV with Lexi Garcia NP   DISCHARGE SUMMARY from hospital N/A    DISCHARGE REPORT from the ER [facility]:  3/23/22 - ED OV with Dr. Lopez  17 - ED OV iwht Dr. Chapin   OPERATIVE REPORT N/A    MEDICATION LIST Internal    LABS N/A    DIAGNOSTIC PROCEDURES N/A    IMAGING (DISC & REPORT)      ULTRASOUND  (ENDOANAL/ENDORECTAL) Internal ealth:  3/23/22 - US Testicular/Scrotum

## 2022-08-22 NOTE — TELEPHONE ENCOUNTER
What is the concern that needs to be addressed by a nurse? Pt has been experiencing multiple seizures within the last week, went to the ER bc they fell and hit their chin, received stitches and got labs done. Please call mother back to discuss plan until next visit.     May a detailed message be left on voicemail? yes    Date of last office visit: 2/7/22    Message routed to: mincep rn pool

## 2022-08-23 NOTE — TELEPHONE ENCOUNTER
Chart reviewed.  Patient was sen at a  ED, blood levels checked.     Latest Reference Range & Units 02/14/22 11:37 08/20/22 21:26   Lamotrigine 3.0 - 15.0 ug/mL 16.5  15.4   Valproic Acid Level   mg/L 96 75     Next scheduled follow up  Future Appointments   Date Time Provider Department Center   9/22/2022  9:00 AM Austen Lemus MD [facility] Owned         Call placed to patient mother, voice message left with call back number.    Will route encounter to MD for review in case there are other recommendations at this time

## 2022-08-25 RX ORDER — DIVALPROEX SODIUM 500 MG/1
1500 TABLET, EXTENDED RELEASE ORAL 2 TIMES DAILY
Qty: 540 TABLET | Refills: 0 | Status: SHIPPED | OUTPATIENT
Start: 2022-08-25 | End: 2022-12-06

## 2022-08-25 NOTE — TELEPHONE ENCOUNTER
"Per Dr.Shams Brooks (coverage for )  \"... Please increase Depakote to 1500 mg bid. Check levels for both VPA and LMT in 2 weeks. \"          Call placed to Chris Madrigal discussed, and repeated back.  Preferred pharmacy confirmed  Location for labs to be drawn confirmed  Follow up reviewed  Future Appointments   Date Time Provider Department Center   9/22/2022  9:00 AM Austen Lemus MD [facility] Owned     Orders placed as per MD recommendations    No further questions at this time  Instructed to call if questions or concerns arise    "

## 2022-09-22 ENCOUNTER — VIRTUAL VISIT (OUTPATIENT)
Dept: NEUROLOGY | Facility: CLINIC | Age: 27
End: 2022-09-22
Payer: MEDICAID

## 2022-09-22 VITALS — BODY MASS INDEX: 31.14 KG/M2 | HEIGHT: 73 IN | WEIGHT: 235 LBS

## 2022-09-22 DIAGNOSIS — G40.909 RECURRENT SEIZURES (H): Primary | ICD-10-CM

## 2022-09-22 NOTE — PROGRESS NOTES
CHIEF COMPLAINT: Seizures.     HISTORY OF PRESENT ILLNESS:  Video call for follow up for seizures. His stepfather was on the conference call. This patient is a 27-year-old, right-handed male with a history of autistic spectrum disorder.  The patient himself cannot provide much history, and the majority of the history is provided by his stepfather.  Since the last visit about 12 months ago, he had 4-5 seizures, only one was focal impaired seizure lasted for 30 sec, rest were focal aware seizures, lasting for about 5-10 sec. His last GTC was about 5 years ago. He did not miss any meds. He was seen in the ED in Aug after he fell on his chin. Depakote was increased from 9176-5196 to 1500 mg bid. He is currently taking Lamotrigine 200 mg bid and Depakote 1500 - 1500 mg.  His recent EEG showed occasional bifrontal spike and wave activities.     Patient had seizure onset in April 2015, another seizure 10 days after that,  both were described as GTCs.  He was initially started on keppra.  It was stopped because of GI side effects.  Then he was started on Lamotrigine, Depakote was added later.  He is currently on Lamictal 100mg bid and Depakote 500-1000.  He had behavioral problems when he was on Lamictal 150 mg bid.  His behavior has been really good for the past 2 years.  He had a seizure on 4/12/2016 that caused fractures of the right zygomatic arch, maxillary sinus and orbit.        According to the mother, on 04/30/2015, the patient was found at home on the kitchen floor. He was cyanotic. He had foaming at the mouth. He had some bleeding from the nose. He was unresponsive, and he had some tonic-clonic movement of his extremities. The clonic-tonic movement lasted for about 1 minute. The mother did not see the beginning of the event. There was no loss of bowel or bladder control. The patient did not bite his tongue. Afterwards the patient was very lethargic, still unresponsive until he was in the ambulance; then he was  able to recognize his father and was able to communicate a little bit.    The patient had no prior history of seizures. He has been taking Geodon for 7 years. No recent medication changes. No recent illness. When EMS arrived at the scene, the blood sugar was found to be 81.      TRIGGERS FOR SEIZURES: Unclear.    RISK FACTORS FOR SEIZURES: He has a history of autistic spectrum disorder. No history of head trauma with loss of consciousness. No history of CNS infection. No history of febrile convulsions.      Current Outpatient Medications   Medication Sig Dispense Refill     cetirizine (ZYRTEC) 10 MG tablet Take 1 tablet (10 mg) by mouth daily (Patient taking differently: Take 10 mg by mouth daily as needed) 90 tablet 3     divalproex sodium extended-release (DEPAKOTE ER) 500 MG 24 hr tablet Take 3 tablets (1,500 mg) by mouth 2 times daily 540 tablet 0     guanFACINE (TENEX) 1 MG tablet Take 1 mg by mouth 2 times daily       lamoTRIgine (LAMICTAL) 100 MG tablet Take 2 tablets (200 mg) by mouth 2 times daily 360 tablet 3     Multiple Vitamin (MULTIVITAMINS PO)        OLANZapine (ZYPREXA) 5 MG tablet Take 5 mg by mouth as needed       ziprasidone (GEODON) 40 MG capsule Take 1 capsule (40 mg) by mouth 2 times daily (with meals) AM 60 capsule 0     ziprasidone (GEODON) 80 MG capsule Take 1 capsule (80 mg) by mouth 2 times daily (with meals) AM AND PM 60 capsule 0     montelukast (SINGULAIR) 10 MG tablet Take 1 tablet (10 mg) by mouth At Bedtime (Patient not taking: No sig reported) 90 tablet 3         PAST MEDICAL HISTORY: Autistic disorder, sleep disturbances.       FAMILY HISTORY: No family history of seizures. Grandmother had a history of migraine headaches. Mother had a history of anxiety and panic attacks. Father had a history of chemical dependency.    SOCIAL HISTORY: He is living with his parents. He had special education. He is single. No alcohol, no drug abuse, no smoking. He is not working.     PHYSICAL  EXAMINATION:      Alert, oriented to name and place. No facial weakness.     REVIEW OF SYSTEMS: Negative.     PREVIOUS DIAGNOSTIC TESTING: MRI of the brain on 04/30 showed a negative study. EEG on 05/01 showed a normal study.      IMPRESSION:    1. New-onset seizure.      Since the last visit about 12 months ago, he had 4-5 seizures, only one was focal impaired seizure lasted for 30 sec, rest were focal aware seizures, lasting for about 5-10 sec. His last GTC was about 5 years ago. He did not miss any meds. He was seen in the ED in Aug after he fell on his chin. Depakote was increased from 3838-6755 to 1500 mg bid.    2. Autistic disorder.    3. Sleep disturbances.      PLAN:    1. Continue Lamotrigine 200 mg bid.  2. Continue Depakote 1500 mg bid.  3. Check Labs Lamictal VPA, AST, Plt.   4. Return to clinic in 6 months. Other options include Vimpat, OXC, etc.      22 min total time was spent on the day of this visit.      12 min was spent on face to face time  6 min was spent on preparation of visit to review charts and labs, ordering medications and tests  4 min was spent on documentation of clinical information

## 2022-09-22 NOTE — LETTER
2022     RE: Shola Owens  : 1995   MRN: [id number]      Dear Colleague,    Thank you for referring your patient, Shola Owens, to the [facility] at [facility]. Please see a copy of my visit note below.    Shola is a 27 year old who is being evaluated via a billable video visit.      How would you like to obtain your AVS? Mail a copy   Will anyone else be joining your video visit? Yes, pt's step father Willis will be joining.          Video-Visit Details    Video Start Time: 9:03 AM    Type of service:  Video Visit    Video End Time:9:15 AM    Originating Location (pt. Location): Home    Distant Location (provider location):  [facility] EPILE[facility]     Platform used for Video Visit: [employer]     Medication and allergies have been reviewed.       CHARY Valencia        CHIEF COMPLAINT: Seizures.     HISTORY OF PRESENT ILLNESS:  Video call for follow up for seizures. His stepfather was on the conference call. This patient is a 27-year-old, right-handed male with a history of autistic spectrum disorder.  The patient himself cannot provide much history, and the majority of the history is provided by his stepfather.  Since the last visit about 12 months ago, he had 4-5 seizures, only one was focal impaired seizure lasted for 30 sec, rest were focal aware seizures, lasting for about 5-10 sec. His last GTC was about 5 years ago. He did not miss any meds. He was seen in the ED in Aug after he fell on his chin. Depakote was increased from 0605-9062 to 1500 mg bid. He is currently taking Lamotrigine 200 mg bid and Depakote 1500 - 1500 mg.  His recent EEG showed occasional bifrontal spike and wave activities.     Patient had seizure onset in 2015, another seizure 10 days after that,  both were described as GTCs.  He was initially started on keppra.  It was stopped because of GI side effects.  Then he was started on Lamotrigine, Depakote was added later.   He is currently on Lamictal 100mg bid and Depakote 500-1000.  He had behavioral problems when he was on Lamictal 150 mg bid.  His behavior has been really good for the past 2 years.  He had a seizure on 4/12/2016 that caused fractures of the right zygomatic arch, maxillary sinus and orbit.        According to the mother, on 04/30/2015, the patient was found at home on the kitchen floor. He was cyanotic. He had foaming at the mouth. He had some bleeding from the nose. He was unresponsive, and he had some tonic-clonic movement of his extremities. The clonic-tonic movement lasted for about 1 minute. The mother did not see the beginning of the event. There was no loss of bowel or bladder control. The patient did not bite his tongue. Afterwards the patient was very lethargic, still unresponsive until he was in the ambulance; then he was able to recognize his father and was able to communicate a little bit.    The patient had no prior history of seizures. He has been taking Geodon for 7 years. No recent medication changes. No recent illness. When EMS arrived at the scene, the blood sugar was found to be 81.      TRIGGERS FOR SEIZURES: Unclear.    RISK FACTORS FOR SEIZURES: He has a history of autistic spectrum disorder. No history of head trauma with loss of consciousness. No history of CNS infection. No history of febrile convulsions.      Current Outpatient Medications   Medication Sig Dispense Refill     cetirizine (ZYRTEC) 10 MG tablet Take 1 tablet (10 mg) by mouth daily (Patient taking differently: Take 10 mg by mouth daily as needed) 90 tablet 3     divalproex sodium extended-release (DEPAKOTE ER) 500 MG 24 hr tablet Take 3 tablets (1,500 mg) by mouth 2 times daily 540 tablet 0     guanFACINE (TENEX) 1 MG tablet Take 1 mg by mouth 2 times daily       lamoTRIgine (LAMICTAL) 100 MG tablet Take 2 tablets (200 mg) by mouth 2 times daily 360 tablet 3     Multiple Vitamin (MULTIVITAMINS PO)        OLANZapine (ZYPREXA) 5  MG tablet Take 5 mg by mouth as needed       ziprasidone (GEODON) 40 MG capsule Take 1 capsule (40 mg) by mouth 2 times daily (with meals) AM 60 capsule 0     ziprasidone (GEODON) 80 MG capsule Take 1 capsule (80 mg) by mouth 2 times daily (with meals) AM AND PM 60 capsule 0     montelukast (SINGULAIR) 10 MG tablet Take 1 tablet (10 mg) by mouth At Bedtime (Patient not taking: No sig reported) 90 tablet 3         PAST MEDICAL HISTORY: Autistic disorder, sleep disturbances.       FAMILY HISTORY: No family history of seizures. Grandmother had a history of migraine headaches. Mother had a history of anxiety and panic attacks. Father had a history of chemical dependency.    SOCIAL HISTORY: He is living with his parents. He had special education. He is single. No alcohol, no drug abuse, no smoking. He is not working.     PHYSICAL EXAMINATION:      Alert, oriented to name and place. No facial weakness.     REVIEW OF SYSTEMS: Negative.     PREVIOUS DIAGNOSTIC TESTING: MRI of the brain on 04/30 showed a negative study. EEG on 05/01 showed a normal study.      IMPRESSION:    1. New-onset seizure.      Since the last visit about 12 months ago, he had 4-5 seizures, only one was focal impaired seizure lasted for 30 sec, rest were focal aware seizures, lasting for about 5-10 sec. His last GTC was about 5 years ago. He did not miss any meds. He was seen in the ED in Aug after he fell on his chin. Depakote was increased from 7772-1169 to 1500 mg bid.    2. Autistic disorder.    3. Sleep disturbances.      PLAN:    1. Continue Lamotrigine 200 mg bid.  2. Continue Depakote 1500 mg bid.  3. Check Labs Lamictal VPA, AST, Plt.   4. Return to clinic in 6 months. Other options include Vimpat, OXC, etc.      22 min total time was spent on the day of this visit.      12 min was spent on face to face time  6 min was spent on preparation of visit to review charts and labs, ordering medications and tests  4 min was spent on documentation of  clinical information      Again, thank you for allowing me to participate in the care of your patient.      Sincerely,    Austen Lemus MD

## 2022-09-22 NOTE — PROGRESS NOTES
Shola is a 27 year old who is being evaluated via a billable video visit.      How would you like to obtain your AVS? Mail a copy   Will anyone else be joining your video visit? Yes, pt's step father Willis will be joining.          Video-Visit Details    Video Start Time: 9:03 AM    Type of service:  Video Visit    Video End Time:9:15 AM    Originating Location (pt. Location): Home    Distant Location (provider location):  [facility]     Platform used for Video Visit: [facility]     Medication and allergies have been reviewed.       Dread Juarez, VF

## 2022-09-22 NOTE — PATIENT INSTRUCTIONS
Times of Days  am pm        Medication Tablet Size Number of Tablets/Capsules Total Daily Dosage    Lamictal  100  2 2       400 mg    Depakote  500  3 3       3000 mg                                                                                                                 Carry this with you at all times.  CONTINUE TAKING YOUR OTHER MEDICATIONS AS PREVIOUSLY DIRECTED.      * * *Do not store medications in the bathroom.  Keep medications away from children!* * *

## 2022-10-18 ENCOUNTER — OFFICE VISIT (OUTPATIENT)
Dept: SURGERY | Facility: CLINIC | Age: 27
End: 2022-10-18
Payer: MEDICAID

## 2022-10-18 ENCOUNTER — PRE VISIT (OUTPATIENT)
Dept: SURGERY | Facility: CLINIC | Age: 27
End: 2022-10-18

## 2022-10-18 VITALS
BODY MASS INDEX: 32.51 KG/M2 | WEIGHT: 240 LBS | DIASTOLIC BLOOD PRESSURE: 84 MMHG | OXYGEN SATURATION: 98 % | HEART RATE: 68 BPM | SYSTOLIC BLOOD PRESSURE: 127 MMHG | HEIGHT: 72 IN

## 2022-10-18 DIAGNOSIS — L98.8 PILONIDAL DISEASE: Primary | ICD-10-CM

## 2022-10-18 PROCEDURE — 99214 OFFICE O/P EST MOD 30 MIN: CPT | Performed by: NURSE PRACTITIONER

## 2022-10-18 ASSESSMENT — ENCOUNTER SYMPTOMS
PARALYSIS: 0
MEMORY LOSS: 0
NUMBNESS: 0
DIZZINESS: 0
TINGLING: 0
LOSS OF CONSCIOUSNESS: 0
HEADACHES: 0
SEIZURES: 1
WEAKNESS: 0
SPEECH CHANGE: 0
TREMORS: 0
DISTURBANCES IN COORDINATION: 0

## 2022-10-18 ASSESSMENT — PAIN SCALES - GENERAL: PAINLEVEL: NO PAIN (0)

## 2022-10-18 NOTE — NURSING NOTE
Chief Complaint   Patient presents with     Rectal/perineal Pain       Vitals:    10/18/22 0914   BP: 127/84   BP Location: Left arm   Patient Position: Sitting   Cuff Size: Adult Large   Pulse: 68   SpO2: 98%   Weight: 240 lb   Height: 6'       Body mass index is 32.55 kg/m .    Cliff Lewis EMT-P

## 2022-10-18 NOTE — LETTER
10/18/2022       RE: Shola Owens  [id number] [facility] [address]     Dear Colleague,    Thank you for referring your patient, Shola Owens, to the [facility] COLON AND RECTAL SURGERY CLINIC [place] at [facility]. Please see a copy of my visit note below.    Colon and Rectal Surgery Consult Clinic Note    Date: 10/18/2022     Referring provider:  Referred Self, MD  No address on file     RE: Shola Owens  : 1995  ELENA: 10/18/2022    Shola Owens is a very pleasant 27 year old male with autism and epilepsy who presents today for pilonidal disease. Patient is accompanied by his mother today.    HPI:  Has had a pilonidal cyst for a few years that drains intermittently. He has fallen a few times (due to epilepsy and falling on ice) that has caused it to open up. Continuously drains now. No pain. No fevers or chills. No surgeries at the site. Does not smoke. Does not work. No diabetes.    Physical Examination: Exam was chaperoned by Varun Early, EMT-P   /84 (BP Location: Left arm, Patient Position: Sitting, Cuff Size: Adult Large)   Pulse 68   Ht 6'   Wt 240 lb   SpO2 98%   BMI 32.55 kg/m           General: alert, oriented, in no acute distress, sitting comfortably  HEENT: mucous membranes moist  Perianal external examination:  Four large pilonidal pits in the mykel and gluteal cleft with hair present in these. The one closest to the anus with some granulation tissue. Cyst cavity, currently healed over, to the right of the top of the mykel cleft.  Digital rectal examination: Was deferred    Anoscopy: Was deferred    Assessment/Plan: 27 year old male with pilonidal disease. Fairly constant drainage but no significant pain. He has pretty extensive pilonidal disease on exam. Multiple pits in the gluteal and mykel cleft tracking to a cyst cavity to the right of the top of the mykel cleft. Heavy hair burden. I discussed surgical intervention  with his mother. I would be concerned about healing from a flap closure with his autism and epilepsy. However, disease is fairly extensive and would have a large wound after healing, which could be challenging to heal. His mother would provide wound cares. Will discuss with Dr. Helm for an opinion and follow up with patient's mother. Patient's questions were answered to his stated satisfaction and he is in agreement with this plan.       Medical history:  Past Medical History:   Diagnosis Date     Acne     improved since 2014.      Autistic disorder, current or active state      Mild persistent asthma        Surgical history:  No past surgical history on file.    Problem list:  Patient Active Problem List    Diagnosis Date Noted     Closed fracture of maxilla (H) 04/18/2016     Priority: Medium     Seizure 04/30/2015     Priority: Medium     Disturbance in sleep behavior 08/31/2006     Priority: Medium     Doing well on Geodon managed by Dr. Sanders.  05/2013:  Geodon 120 mg BID.  Problem list name updated by automated process. Provider to review       Active autistic disorder 06/13/2005     Priority: Medium     Moderate to severe.  On Tenex and Cymbalta for anxiety.  Meds managed by Dr. Sanders.  [facility].  05/2013: Tenex 1 mg BID.  Started Zyprexa PRN for some increased aggression.  Reflux symptoms have been episodic since 11/2011. Associated with the size of the meals. Portion control is effective.   Problem list name updated by automated process. Provider to review         Medications:  Current Outpatient Medications   Medication Sig Dispense Refill     divalproex sodium extended-release (DEPAKOTE ER) 500 MG 24 hr tablet Take 3 tablets (1,500 mg) by mouth 2 times daily 540 tablet 0     guanFACINE (TENEX) 1 MG tablet Take 1 mg by mouth 2 times daily       lamoTRIgine (LAMICTAL) 100 MG tablet Take 2 tablets (200 mg) by mouth 2 times daily 360 tablet 3     Multiple Vitamin  (MULTIVITAMINS PO)        OLANZapine (ZYPREXA) 5 MG tablet Take 5 mg by mouth as needed       ziprasidone (GEODON) 40 MG capsule Take 1 capsule (40 mg) by mouth 2 times daily (with meals) AM 60 capsule 0     ziprasidone (GEODON) 80 MG capsule Take 1 capsule (80 mg) by mouth 2 times daily (with meals) AM AND PM 60 capsule 0     cetirizine (ZYRTEC) 10 MG tablet Take 1 tablet (10 mg) by mouth daily (Patient not taking: Reported on 10/18/2022) 90 tablet 3     montelukast (SINGULAIR) 10 MG tablet Take 1 tablet (10 mg) by mouth At Bedtime (Patient not taking: No sig reported) 90 tablet 3       Allergies:  Allergies   Allergen Reactions     Risperidone Hives       Family history:  Family History   Problem Relation Age of Onset     Hypertension Father      Hypertension Paternal Grandmother      Thyroid Disease Paternal Grandmother         hypo     Allergies Mother         seasonal     Heart Disease Maternal Grandmother         A. Fib     Thyroid Disease Maternal Grandmother         hypo     Cancer Maternal Grandfather         cancerous tumor in his arm     Cancer Paternal Grandfather         passed from lung cancer     Hypertension Maternal Uncle        Social history:  Social History     Tobacco Use     Smoking status: Never     Smokeless tobacco: Never     Tobacco comments:     outside smokers-not in the house   Substance Use Topics     Alcohol use: No     Alcohol/week: 0.0 standard drinks    Marital status: single.    Nursing Notes:   Cliff Lewis EMT  10/18/2022  9:18 AM  Signed  Chief Complaint   Patient presents with     Rectal/perineal Pain       Vitals:    10/18/22 0914   BP: 127/84   BP Location: Left arm   Patient Position: Sitting   Cuff Size: Adult Large   Pulse: 68   SpO2: 98%   Weight: 240 lb   Height: 6'       Body mass index is 32.55 kg/m .    Cliff Lewis EMT-P         30 minutes spent on the date of encounter (excluding time performing procedures) performing chart review, history and exam, documentation  and further activities as noted above.      This note was created using speech recognition software and may contain unintended word substitutions.            Again, thank you for allowing me to participate in the care of your patient.      Sincerely,    DEYSI Browne CNP

## 2022-10-18 NOTE — PROGRESS NOTES
Colon and Rectal Surgery Consult Clinic Note    Date: 10/18/2022     Referring provider:  Referred Self, MD  No address on file     RE: Shola Owens  : 1995  ELENA: 10/18/2022    Shola Owens is a very pleasant 27 year old male with autism and epilepsy who presents today for pilonidal disease. Patient is accompanied by his mother today.    HPI:  Has had a pilonidal cyst for a few years that drains intermittently. He has fallen a few times (due to epilepsy and falling on ice) that has caused it to open up. Continuously drains now. No pain. No fevers or chills. No surgeries at the site. Does not smoke. Does not work. No diabetes.    Physical Examination: Exam was chaperoned by Varun Early, EMT-P   /84 (BP Location: Left arm, Patient Position: Sitting, Cuff Size: Adult Large)   Pulse 68   Ht 6'   Wt 240 lb   SpO2 98%   BMI 32.55 kg/m           General: alert, oriented, in no acute distress, sitting comfortably  HEENT: mucous membranes moist  Perianal external examination:  Four large pilonidal pits in the mykel and gluteal cleft with hair present in these. The one closest to the anus with some granulation tissue. Cyst cavity, currently healed over, to the right of the top of the  cleft.  Digital rectal examination: Was deferred    Anoscopy: Was deferred    Assessment/Plan: 27 year old male with pilonidal disease. Fairly constant drainage but no significant pain. He has pretty extensive pilonidal disease on exam. Multiple pits in the gluteal and mykel cleft tracking to a cyst cavity to the right of the top of the mykel cleft. Heavy hair burden. I discussed surgical intervention with his mother. I would be concerned about healing from a flap closure with his autism and epilepsy. However, disease is fairly extensive and would have a large wound after healing, which could be challenging to heal. His mother would provide wound cares. Will discuss with Dr. Helm for an opinion and follow up with  patient's mother. Patient's questions were answered to his stated satisfaction and he is in agreement with this plan.       Medical history:  Past Medical History:   Diagnosis Date     Acne     improved since 2014.      Autistic disorder, current or active state      Mild persistent asthma        Surgical history:  No past surgical history on file.    Problem list:  Patient Active Problem List    Diagnosis Date Noted     Closed fracture of maxilla (H) 04/18/2016     Priority: Medium     Seizure 04/30/2015     Priority: Medium     Disturbance in sleep behavior 08/31/2006     Priority: Medium     Doing well on Geodon managed by Dr. Sanders.  05/2013:  Geodon 120 mg BID.  Problem list name updated by automated process. Provider to review       Active autistic disorder 06/13/2005     Priority: Medium     Moderate to severe.  On Tenex and Cymbalta for anxiety.  Meds managed by Dr. Sanders.  [facility].  05/2013: Tenex 1 mg BID.  Started Zyprexa PRN for some increased aggression.  Reflux symptoms have been episodic since 11/2011. Associated with the size of the meals. Portion control is effective.   Problem list name updated by automated process. Provider to review         Medications:  Current Outpatient Medications   Medication Sig Dispense Refill     divalproex sodium extended-release (DEPAKOTE ER) 500 MG 24 hr tablet Take 3 tablets (1,500 mg) by mouth 2 times daily 540 tablet 0     guanFACINE (TENEX) 1 MG tablet Take 1 mg by mouth 2 times daily       lamoTRIgine (LAMICTAL) 100 MG tablet Take 2 tablets (200 mg) by mouth 2 times daily 360 tablet 3     Multiple Vitamin (MULTIVITAMINS PO)        OLANZapine (ZYPREXA) 5 MG tablet Take 5 mg by mouth as needed       ziprasidone (GEODON) 40 MG capsule Take 1 capsule (40 mg) by mouth 2 times daily (with meals) AM 60 capsule 0     ziprasidone (GEODON) 80 MG capsule Take 1 capsule (80 mg) by mouth 2 times daily (with meals) AM AND PM 60 capsule 0      cetirizine (ZYRTEC) 10 MG tablet Take 1 tablet (10 mg) by mouth daily (Patient not taking: Reported on 10/18/2022) 90 tablet 3     montelukast (SINGULAIR) 10 MG tablet Take 1 tablet (10 mg) by mouth At Bedtime (Patient not taking: No sig reported) 90 tablet 3       Allergies:  Allergies   Allergen Reactions     Risperidone Hives       Family history:  Family History   Problem Relation Age of Onset     Hypertension Father      Hypertension Paternal Grandmother      Thyroid Disease Paternal Grandmother         hypo     Allergies Mother         seasonal     Heart Disease Maternal Grandmother         A. Fib     Thyroid Disease Maternal Grandmother         hypo     Cancer Maternal Grandfather         cancerous tumor in his arm     Cancer Paternal Grandfather         passed from lung cancer     Hypertension Maternal Uncle        Social history:  Social History     Tobacco Use     Smoking status: Never     Smokeless tobacco: Never     Tobacco comments:     outside smokers-not in the house   Substance Use Topics     Alcohol use: No     Alcohol/week: 0.0 standard drinks    Marital status: single.    Nursing Notes:   Cliff Lewis EMT  10/18/2022  9:18 AM  Signed  Chief Complaint   Patient presents with     Rectal/perineal Pain       Vitals:    10/18/22 0914   BP: 127/84   BP Location: Left arm   Patient Position: Sitting   Cuff Size: Adult Large   Pulse: 68   SpO2: 98%   Weight: 240 lb   Height: 6'       Body mass index is 32.55 kg/m .    Cliff Lewis EMT-P         30 minutes spent on the date of encounter (excluding time performing procedures) performing chart review, history and exam, documentation and further activities as noted above.    DEYSI Juarez, NP-C  Colon and Rectal Surgery   [facility]    This note was created using speech recognition software and may contain unintended word substitutions.

## 2022-10-19 DIAGNOSIS — L98.8 PILONIDAL DISEASE: Primary | ICD-10-CM

## 2022-10-25 ENCOUNTER — HOSPITAL ENCOUNTER (OUTPATIENT)
Facility: CLINIC | Age: 27
End: 2022-10-25
Attending: COLON & RECTAL SURGERY | Admitting: COLON & RECTAL SURGERY
Payer: MEDICAID

## 2022-10-25 ENCOUNTER — TELEPHONE (OUTPATIENT)
Dept: NEUROLOGY | Facility: CLINIC | Age: 27
End: 2022-10-25

## 2022-10-25 ENCOUNTER — TELEPHONE (OUTPATIENT)
Dept: SURGERY | Facility: CLINIC | Age: 27
End: 2022-10-25

## 2022-10-25 DIAGNOSIS — L98.8 PILONIDAL DISEASE: ICD-10-CM

## 2022-10-25 NOTE — TELEPHONE ENCOUNTER
"Caregiver calls to report continued seizures. Two over the past month. 5, 8 seconds long. No loss of consciousness with either. Very short, \"would have fallen if I hadn't caught him\".  Starts with: looked to the left, makes a noise, goes to the ground. Shakes. Resolved quickly.     No new medical issues, no stress, no injuries, no missed medications.     Step father states he's not sure if Dr. Lemus would want to intervene at this point or not, but asked us to update him.  -----------------------------------------------------  Addendum 11/8/2022: detailed VM left with instructions to continue to monitor and schedule a return appointment for January.    "

## 2022-10-25 NOTE — TELEPHONE ENCOUNTER
Patient Name: Shola Owens   MRN: [id number]   Case#: [id number]   Surgeon(s) and Role:      * Carl Helm MD - Primary   Date requested: * No dates entered *   Location: [facility] OR   Procedure(s):   EXAM UNDER ANESTHESIA, ANUS, LAY OPEN PILONIDAL CYSTECTOMY , POSSIBLE WOUND VAC PLACEMENT (N/A)     Additional Instructions for the Case  Multi-surgeon case:  NO  Time in minutes:  45  Anesthesia: MAC  Prep: NONE  Pre-Op: PAC  [employer] NB  Clinic x3  Labs: NO  WOC: NO  Special equipment: NO  Special Instructions: [facility]    Schedule with Rosa Maria Melton NP or Citllai GREENE 1-2 weeks after surgery.

## 2022-10-25 NOTE — TELEPHONE ENCOUNTER
What is the concern that needs to be addressed by a nurse?     Shola Owens's father, Willis is calling to report a couple of seizures patient had in the last month, one lasting 8 seconds, the other lasting 4 seconds.    May a detailed message be left on voicemail? YES    Date of last office visit: 9/22/22    Message routed to: MINCEP RN

## 2022-10-26 NOTE — TELEPHONE ENCOUNTER
FUTURE VISIT INFORMATION      SURGERY INFORMATION:    Date: 23    [facility]    Surgeon:  Carl Helm MD    Anesthesia Type:  MAC    Procedure: EXAM UNDER ANESTHESIA, ANUS, LAY OPEN PILONIDAL CYSTECTOMY , POSSIBLE WOUND VAC PLACEMENT    RECORDS REQUESTED FROM:       Primary Care Provider: [facility]    Most recent EKG+ [id number]

## 2022-11-23 ENCOUNTER — TELEPHONE (OUTPATIENT)
Dept: FAMILY MEDICINE | Facility: CLINIC | Age: 27
End: 2022-11-23

## 2022-11-23 NOTE — TELEPHONE ENCOUNTER
Spoke with mother.  Pt needs virtual urgent care visit for proper evaluation and consideration for antiviral medication.    Pt's symptoms began 11/21/22.  Pt low-grad fever per mom, fatigue, and is coughing.    Mom will arrange.    Constanza Reyes RN   [facility] Family Practice, Internal Medicine, and Pediatric Clinics    Appointment line: [phone], available 24 hours.  [facility], 24 hour nurse line, available by calling clinic after hours at [phone] and following prompts.

## 2022-11-23 NOTE — TELEPHONE ENCOUNTER
Symptoms    Describe your symptoms:yesterday tested positive for COVID. Shola  Is autistic and has epilepsy. Is there anything he can be taking?  Any pain:concerned because of his health issues if there is something he should have. The family has cold like symptoms, but we are concerned about Shola since he is non verbal  How long have you been having symptoms: cough, fatigue,low grade fever .  Have you been seen for this:  No  Appointment offered?: No  Triage offered?: Yes:   Preferred Pharmacy:   [address] [address]  Phone: [phone] Fax: [phone]      Okay to leave a detailed message?: Yes at Home number on file [phone] (home)

## 2022-11-25 ENCOUNTER — VIRTUAL VISIT (OUTPATIENT)
Dept: INTERNAL MEDICINE | Facility: CLINIC | Age: 27
End: 2022-11-25
Payer: MEDICAID

## 2022-11-25 DIAGNOSIS — U07.1 INFECTION DUE TO 2019 NOVEL CORONAVIRUS: Primary | ICD-10-CM

## 2022-11-25 PROCEDURE — 99213 OFFICE O/P EST LOW 20 MIN: CPT | Mod: 95 | Performed by: INTERNAL MEDICINE

## 2022-11-25 NOTE — PROGRESS NOTES
Shola is a 27 year old who is being evaluated via a billable video visit.      How would you like to obtain your AVS? MyChart  If the video visit is dropped, the invitation should be resent by: Text to cell phone: [phone]  Will anyone else be joining your video visit? No    Assessment & Plan   Infection due to 2019 novel coronavirus  Symptom onset 5 days ago. Discussed treatment options with Paxlovid (not a great candidate given med interaction with Geodon) vs molnupiravir vs monoclonal antibody infusion. After our discussion, Shola's mom elected to not pursue treatment since his symptoms are improving over the last 24 hours. We discussed that if he were to pursue treatment, today would really be the day he would need to start it. She vocalized understanding and declined which I think is reasonable given his reported improvement. Continue supportive cares.    F/u with regular PCP at regular interval or sooner GEMA Fagan MD  [facility]   Shola is a 27 year old presenting for the following health issues:  Covid Concern  This is the first time I have met Shola. Majority of visit done speaking to his mother.    COVID-19 Symptom Review  How many days ago did these symptoms start? 5 days     Are any of the following symptoms significant for you?    New or worsening difficulty breathing? No    Worsening cough? Yes, I am coughing up mucus.    Fever or chills? No    Headache: No    Sore throat: No    Chest pain: No    Diarrhea: No    Body aches? No    What treatments has patient tried? advil   Does patient live in a nursing home, group home, or shelter? No  Does patient have a way to get food/medications during quarantined? Yes, I have a friend or family member who can help me.    Symptoms improving.    Review of Systems   Constitutional, HEENT, cardiovascular, pulmonary, gi systems are negative, except as otherwise noted.      Objective       Vitals: No vitals  were obtained today due to virtual visit.    Physical Exam   GENERAL: Healthy, alert and no distress  EYES: Eyes grossly normal to inspection.  No discharge or erythema, or obvious scleral/conjunctival abnormalities.  RESP: No audible wheeze, cough, or visible cyanosis.  No visible retractions or increased work of breathing.    SKIN: Visible skin clear. No significant rash, abnormal pigmentation or lesions.  NEURO: Cranial nerves grossly intact.  Mentation and speech appropriate for age.  PSYCH: Mentation appears normal, affect normal/bright, judgement and insight intact, normal speech and appearance well-groomed.    Video-Visit Details  Video Start Time: 2:02 PM    Type of service: Video Visit    Video End Time: 2:08 PM    Originating Location (pt. Location): Home    Distant Location (provider location):  Off-site    Platform used for Video Visit: [employer]

## 2022-12-06 DIAGNOSIS — G40.909 SEIZURE DISORDER (H): ICD-10-CM

## 2022-12-06 RX ORDER — DIVALPROEX SODIUM 500 MG/1
1500 TABLET, EXTENDED RELEASE ORAL 2 TIMES DAILY
Qty: 540 TABLET | Refills: 0 | Status: SHIPPED | OUTPATIENT
Start: 2022-12-06 | End: 2023-02-13

## 2022-12-12 ENCOUNTER — HOSPITAL ENCOUNTER (EMERGENCY)
Facility: CLINIC | Age: 27
Discharge: HOME | End: 2022-12-12
Attending: EMERGENCY MEDICINE | Admitting: EMERGENCY MEDICINE
Payer: MEDICAID

## 2022-12-12 ENCOUNTER — APPOINTMENT (OUTPATIENT)
Dept: CT IMAGING | Facility: CLINIC | Age: 27
End: 2022-12-12
Attending: EMERGENCY MEDICINE
Payer: MEDICAID

## 2022-12-12 VITALS
WEIGHT: 245 LBS | BODY MASS INDEX: 33.18 KG/M2 | HEIGHT: 72 IN | HEART RATE: 71 BPM | OXYGEN SATURATION: 97 % | SYSTOLIC BLOOD PRESSURE: 122 MMHG | RESPIRATION RATE: 18 BRPM | DIASTOLIC BLOOD PRESSURE: 67 MMHG | TEMPERATURE: 98 F

## 2022-12-12 DIAGNOSIS — G40.909 SEIZURE DISORDER (H): ICD-10-CM

## 2022-12-12 DIAGNOSIS — S09.90XA CLOSED HEAD INJURY, INITIAL ENCOUNTER: ICD-10-CM

## 2022-12-12 DIAGNOSIS — S01.81XA LACERATION OF BROW WITHOUT COMPLICATION, INITIAL ENCOUNTER: ICD-10-CM

## 2022-12-12 LAB
ALBUMIN SERPL BCG-MCNC: 3.9 G/DL (ref 3.5–5.2)
ALP SERPL-CCNC: 47 U/L (ref 40–129)
ALT SERPL W P-5'-P-CCNC: 35 U/L (ref 10–50)
ANION GAP SERPL CALCULATED.3IONS-SCNC: 13 MMOL/L (ref 7–15)
AST SERPL W P-5'-P-CCNC: 30 U/L (ref 10–50)
BASOPHILS # BLD AUTO: 0 10E3/UL (ref 0–0.2)
BASOPHILS NFR BLD AUTO: 0 %
BILIRUB SERPL-MCNC: 0.3 MG/DL
BUN SERPL-MCNC: 13.7 MG/DL (ref 6–20)
CALCIUM SERPL-MCNC: 9.2 MG/DL (ref 8.6–10)
CHLORIDE SERPL-SCNC: 104 MMOL/L (ref 98–107)
CREAT SERPL-MCNC: 1.15 MG/DL (ref 0.67–1.17)
DEPRECATED HCO3 PLAS-SCNC: 24 MMOL/L (ref 22–29)
EOSINOPHIL # BLD AUTO: 0.1 10E3/UL (ref 0–0.7)
EOSINOPHIL NFR BLD AUTO: 1 %
ERYTHROCYTE [DISTWIDTH] IN BLOOD BY AUTOMATED COUNT: 13.9 % (ref 10–15)
GFR SERPL CREATININE-BSD FRML MDRD: 88 ML/MIN/1.73M2
GLUCOSE SERPL-MCNC: 100 MG/DL (ref 70–99)
HCT VFR BLD AUTO: 41.7 % (ref 40–53)
HGB BLD-MCNC: 13.5 G/DL (ref 13.3–17.7)
HOLD SPECIMEN: NORMAL
IMM GRANULOCYTES # BLD: 0.1 10E3/UL
IMM GRANULOCYTES NFR BLD: 1 %
LYMPHOCYTES # BLD AUTO: 2.5 10E3/UL (ref 0.8–5.3)
LYMPHOCYTES NFR BLD AUTO: 29 %
MCH RBC QN AUTO: 31.5 PG (ref 26.5–33)
MCHC RBC AUTO-ENTMCNC: 32.4 G/DL (ref 31.5–36.5)
MCV RBC AUTO: 97 FL (ref 78–100)
MONOCYTES # BLD AUTO: 0.8 10E3/UL (ref 0–1.3)
MONOCYTES NFR BLD AUTO: 9 %
NEUTROPHILS # BLD AUTO: 5.2 10E3/UL (ref 1.6–8.3)
NEUTROPHILS NFR BLD AUTO: 60 %
NRBC # BLD AUTO: 0 10E3/UL
NRBC BLD AUTO-RTO: 0 /100
PLATELET # BLD AUTO: 178 10E3/UL (ref 150–450)
POTASSIUM SERPL-SCNC: 4.6 MMOL/L (ref 3.4–5.3)
PROT SERPL-MCNC: 6.7 G/DL (ref 6.4–8.3)
RBC # BLD AUTO: 4.29 10E6/UL (ref 4.4–5.9)
SODIUM SERPL-SCNC: 141 MMOL/L (ref 136–145)
VALPROATE SERPL-MCNC: 93.5 UG/ML
WBC # BLD AUTO: 8.7 10E3/UL (ref 4–11)

## 2022-12-12 PROCEDURE — 85025 COMPLETE CBC W/AUTO DIFF WBC: CPT | Performed by: EMERGENCY MEDICINE

## 2022-12-12 PROCEDURE — 12013 RPR F/E/E/N/L/M 2.6-5.0 CM: CPT

## 2022-12-12 PROCEDURE — 70450 CT HEAD/BRAIN W/O DYE: CPT

## 2022-12-12 PROCEDURE — 99284 EMERGENCY DEPT VISIT MOD MDM: CPT | Mod: 25

## 2022-12-12 PROCEDURE — 99284 EMERGENCY DEPT VISIT MOD MDM: CPT | Mod: 25 | Performed by: EMERGENCY MEDICINE

## 2022-12-12 PROCEDURE — 12013 RPR F/E/E/N/L/M 2.6-5.0 CM: CPT | Performed by: EMERGENCY MEDICINE

## 2022-12-12 PROCEDURE — 80175 DRUG SCREEN QUAN LAMOTRIGINE: CPT | Performed by: EMERGENCY MEDICINE

## 2022-12-12 PROCEDURE — 36415 COLL VENOUS BLD VENIPUNCTURE: CPT | Performed by: EMERGENCY MEDICINE

## 2022-12-12 PROCEDURE — 80164 ASSAY DIPROPYLACETIC ACD TOT: CPT | Performed by: EMERGENCY MEDICINE

## 2022-12-12 PROCEDURE — 82947 ASSAY GLUCOSE BLOOD QUANT: CPT | Performed by: EMERGENCY MEDICINE

## 2022-12-12 PROCEDURE — 250N000009 HC RX 250: Performed by: EMERGENCY MEDICINE

## 2022-12-12 RX ORDER — ZONISAMIDE 100 MG/1
100 CAPSULE ORAL AT BEDTIME
Qty: 132 CAPSULE | Refills: 0 | Status: SHIPPED | OUTPATIENT
Start: 2022-12-12 | End: 2023-02-08

## 2022-12-12 RX ORDER — METHYLCELLULOSE 4000CPS 30 %
POWDER (GRAM) MISCELLANEOUS ONCE
Status: DISCONTINUED | OUTPATIENT
Start: 2022-12-12 | End: 2022-12-12 | Stop reason: HOSPADM

## 2022-12-12 RX ADMIN — Medication 3 ML: at 10:31

## 2022-12-12 ASSESSMENT — ACTIVITIES OF DAILY LIVING (ADL): ADLS_ACUITY_SCORE: 35

## 2022-12-12 NOTE — ED NOTES
Hx of autism and seizure disorder.    Pt had grand mal seizure this morning hitting left upper head with laceration through left eyebrow.   Pt's mother legal guardian and caregiver, reports pt normally has one seizure each month, last seizure was approximately 1 month ago.    Pt alert, lying on the bed.  Warm blankets given to pt.

## 2022-12-12 NOTE — ED PROVIDER NOTES
History     Chief Complaint   Patient presents with     Head Laceration     Had a e     HPI  Shola Owens Jr. is a 27 year old male who presents from home after brief several second generalized tonic-clonic seizure struck his left brow on the door sustaining laceration.  Been acting normal since occurrence this morning.  Denies complaint currently.  History of autism, seizure disorder.  Taking medications as prescribed, followed by neurology at [facility], .  Last lamotrigine level 3 months ago mildly supratherapeutic at 15.4, valproic acid level 75.  Recently had COVID with symptoms for couple days, continues to have mild cough no fever no vomiting no diarrhea.    Allergies:  Allergies   Allergen Reactions     Risperidone Hives     Problem List:    Patient Active Problem List    Diagnosis Date Noted     Pilonidal disease 10/25/2022     Priority: Medium     Added automatically from request for surgery [id number]       Closed fracture of maxilla (H) 04/18/2016     Priority: Medium     Disturbance in sleep behavior 08/31/2006     Priority: Medium     Doing well on Geodon managed by Dr. Sanders.  05/2013:  Geodon 120 mg BID.  Problem list name updated by automated process. Provider to review       Active autistic disorder 06/13/2005     Priority: Medium     Moderate to severe.  On Tenex and Cymbalta for anxiety.  Meds managed by Dr. Sanders.  [facility].  05/2013: Tenex 1 mg BID.  Started Zyprexa PRN for some increased aggression.  Reflux symptoms have been episodic since 11/2011. Associated with the size of the meals. Portion control is effective.   Problem list name updated by automated process. Provider to review       Seizure 04/30/2015        Past Medical History:    Past Medical History:   Diagnosis Date     Acne      Autistic disorder, current or active state      Mild persistent asthma        Past Surgical History:    No past surgical history on file.    Family History:    Family  History   Problem Relation Age of Onset     Hypertension Father      Hypertension Paternal Grandmother      Thyroid Disease Paternal Grandmother         hypo     Allergies Mother         seasonal     Heart Disease Maternal Grandmother         A. Fib     Thyroid Disease Maternal Grandmother         hypo     Cancer Maternal Grandfather         cancerous tumor in his arm     Cancer Paternal Grandfather         passed from lung cancer     Hypertension Maternal Uncle        Social History:  Marital Status:  Single [1]  Social History     Tobacco Use     Smoking status: Never     Smokeless tobacco: Never     Tobacco comments:     outside smokers-not in the house   Vaping Use     Vaping Use: Never used   Substance Use Topics     Alcohol use: No     Alcohol/week: 0.0 standard drinks     Drug use: No        Medications:    zonisamide (ZONEGRAN) 100 MG capsule  cetirizine (ZYRTEC) 10 MG tablet  divalproex sodium extended-release (DEPAKOTE ER) 500 MG 24 hr tablet  guanFACINE (TENEX) 1 MG tablet  lamoTRIgine (LAMICTAL) 100 MG tablet  montelukast (SINGULAIR) 10 MG tablet  Multiple Vitamin (MULTIVITAMINS PO)  OLANZapine (ZYPREXA) 5 MG tablet  ziprasidone (GEODON) 40 MG capsule  ziprasidone (GEODON) 80 MG capsule          Review of Systems  All other systems reviewed and are negative.    Physical Exam   BP: (!) 135/92  Pulse: 79  Temp: 98  F (36.7  C)  Resp: 18  Height: 182.9 cm (6')  Weight: 111.1 kg (245 lb)  SpO2: 100 %      Physical Exam  Nontoxic-appearing no respiratory distress alert answers questions appropriately  Head atraumatic normocephalic with exception of 3 cm vertically oriented laceration left to mid brow.  PERRL, EOMI  Neck supple full active painless range of motion  Strength sensation intact throughout the extremities  There is no ataxia    ED Course    Generalized tonic-clonic seizure occurred during my initial evaluation lasting 25 seconds.  Recommend IV access, basic labs, anticonvulsant levels ordered, CT  head given recurrent seizure after head injury this morning.                 Procedures  Left brow laceration  Prepped in usual fashion and anesthetized with 6 cc 0.5% bupivacaine with epinephrine  Explored no foreign body present, closed with running subcu 5-0 rapid Vicryl, skin closed with running 5-0 Ethilon.            Critical Care time:  none               Results for orders placed or performed during the hospital encounter of 12/12/22 (from the past 24 hour(s))   [place] Draw    Narrative    The following orders were created for panel order [place] Draw.  Procedure                               Abnormality         Status                     ---------                               -----------         ------                     Extra Blue Top Tube[[id number]]                              Final result               Extra Red Top Tube[[id number]]                               Final result               Extra Green Top (Lithium...[[id number]]                      Final result               Extra Purple Top Tube[[id number]]                            Final result                 Please view results for these tests on the individual orders.   CBC with platelets differential    Narrative    The following orders were created for panel order CBC with platelets differential.  Procedure                               Abnormality         Status                     ---------                               -----------         ------                     CBC with platelets and d...[[id number]]  Abnormal            Final result                 Please view results for these tests on the individual orders.   Comprehensive metabolic panel   Result Value Ref Range    Sodium 141 136 - 145 mmol/L    Potassium 4.6 3.4 - 5.3 mmol/L    Chloride 104 98 - 107 mmol/L    Carbon Dioxide (CO2) 24 22 - 29 mmol/L    Anion Gap 13 7 - 15 mmol/L    Urea Nitrogen 13.7 6.0 - 20.0 mg/dL    Creatinine 1.15 0.67 - 1.17 mg/dL    Calcium 9.2 8.6 - 10.0 mg/dL     Glucose 100 (H) 70 - 99 mg/dL    Alkaline Phosphatase 47 40 - 129 U/L    AST 30 10 - 50 U/L    ALT 35 10 - 50 U/L    Protein Total 6.7 6.4 - 8.3 g/dL    Albumin 3.9 3.5 - 5.2 g/dL    Bilirubin Total 0.3 <=1.2 mg/dL    GFR Estimate 88 >60 mL/min/1.73m2   Valproic acid/Depakote level   Result Value Ref Range    Valproic acid 93.5   ug/mL   Extra Blue Top Tube   Result Value Ref Range    Hold Specimen JIC    Extra Red Top Tube   Result Value Ref Range    Hold Specimen JIC    Extra Green Top (Lithium Heparin) Tube   Result Value Ref Range    Hold Specimen JIC    Extra Purple Top Tube   Result Value Ref Range    Hold Specimen JIC    CBC with platelets and differential   Result Value Ref Range    WBC Count 8.7 4.0 - 11.0 10e3/uL    RBC Count 4.29 (L) 4.40 - 5.90 10e6/uL    Hemoglobin 13.5 13.3 - 17.7 g/dL    Hematocrit 41.7 40.0 - 53.0 %    MCV 97 78 - 100 fL    MCH 31.5 26.5 - 33.0 pg    MCHC 32.4 31.5 - 36.5 g/dL    RDW 13.9 10.0 - 15.0 %    Platelet Count 178 150 - 450 10e3/uL    % Neutrophils 60 %    % Lymphocytes 29 %    % Monocytes 9 %    % Eosinophils 1 %    % Basophils 0 %    % Immature Granulocytes 1 %    NRBCs per 100 WBC 0 <1 /100    Absolute Neutrophils 5.2 1.6 - 8.3 10e3/uL    Absolute Lymphocytes 2.5 0.8 - 5.3 10e3/uL    Absolute Monocytes 0.8 0.0 - 1.3 10e3/uL    Absolute Eosinophils 0.1 0.0 - 0.7 10e3/uL    Absolute Basophils 0.0 0.0 - 0.2 10e3/uL    Absolute Immature Granulocytes 0.1 <=0.4 10e3/uL    Absolute NRBCs 0.0 10e3/uL   CT Head w/o Contrast    Narrative    CT OF THE HEAD WITHOUT CONTRAST December 12, 2022 10:32 AM     HISTORY: Seizure closed head injury.    TECHNIQUE: Axial CT images of the head from the skull base to the  vertex were acquired without IV contrast. Radiation dose for this scan  was reduced using automated exposure control, adjustment of the mA  and/or kV according to patient size, or iterative reconstruction  technique.    COMPARISON: Head CT 4/12/2016.    FINDINGS: The  ventricles and basal cisterns are within normal limits  in configuration. There is no midline shift. There are no extra-axial  fluid collections. Gray-white differentiation is well maintained.    No intracranial hemorrhage, mass or recent infarct.    The visualized paranasal sinuses are well-aerated. There is no  mastoiditis. There are no fractures of the visualized bones.      Impression    IMPRESSION: Normal head CT.        SAHARA KWOK MD         SYSTEM ID:  [id number]       Medications   methylcellulose powder (has no administration in time range)   lido-EPINEPHrine-tetracaine (LET) topical gel GEL (3 mLs Topical Given 12/12/22 1031)       Assessments & Plan (with Medical Decision Making)  Seizure disorder, 2 seizures this morning as described, given closed head injury and recurrent seizure in ED CT scan head ordered and was negative by radiology read, baseline labs normal as well.  Reviewed with  patient's neurologist of record, recommends starting Zonegran in addition to valproate and lamotrigine.  Former to med levels ordered and pending.  Sutures out 7 days.  Follow-up neurology 1 to 2 months.  Seizure precautions, return criteria reviewed     I have reviewed the nursing notes.    I have reviewed the findings, diagnosis, plan and need for follow up with the patient.          Discharge Medication List as of 12/12/2022 12:12 PM      START taking these medications    Details   zonisamide (ZONEGRAN) 100 MG capsule Take 1 capsule (100 mg) by mouth At Bedtime Take 100 mg at bedtime for 2 weeks then Take 200 mg at bedtime for 2 weeks then Take 300 mg at bedtime, Disp-132 capsule, R-0, E-Prescribe             Final diagnoses:   Seizure disorder (H)   Closed head injury, initial encounter   Laceration of brow without complication, initial encounter       12/12/2022   [facility] EMERGENCY DEPT     Florencio Chaudhary MD  12/12/22 6847

## 2022-12-12 NOTE — DISCHARGE INSTRUCTIONS
Sutures out 7 to 10 days    Zonegran as prescribed 100 mg at bedtime for 2 weeks then increase to 200 mg at bedtime for 2 weeks and then ultimately to 300 mg at bedtime.    Follow-up with  in 4 to 6 weeks    Return for vomiting, focal neurologic change, ongoing seizure or any other concern

## 2022-12-12 NOTE — ED TRIAGE NOTES
Pt here with head laceration to left forehead. Pt had a seizure this morning and hit his head on the door. Pt is at his normal baseline neuro status per Mom. Pt's Mom at side is his guardian. Mom said pt does not need to be seen for his seizures, he has a specialist for that.      Triage Assessment     Row Name 12/12/22 0800       Triage Assessment (Adult)    Airway WDL WDL       Respiratory WDL    Respiratory WDL WDL       Cardiac WDL    Cardiac WDL WDL       Cognitive/Neuro/Behavioral WDL    Cognitive/Neuro/Behavioral WDL X;orientation;mood/behavior;speech  all baseline per Mom. seizure hx.

## 2022-12-13 ENCOUNTER — MEDICAL CORRESPONDENCE (OUTPATIENT)
Dept: HEALTH INFORMATION MANAGEMENT | Facility: CLINIC | Age: 27
End: 2022-12-13

## 2022-12-13 ENCOUNTER — TRANSFERRED RECORDS (OUTPATIENT)
Dept: HEALTH INFORMATION MANAGEMENT | Facility: CLINIC | Age: 27
End: 2022-12-13

## 2022-12-13 LAB — LAMOTRIGINE SERPL-MCNC: 20.4 UG/ML

## 2022-12-19 ENCOUNTER — PRE VISIT (OUTPATIENT)
Dept: SURGERY | Facility: CLINIC | Age: 27
End: 2022-12-19

## 2022-12-19 ENCOUNTER — DOCUMENTATION ONLY (OUTPATIENT)
Dept: OTHER | Facility: CLINIC | Age: 27
End: 2022-12-19

## 2023-01-04 ENCOUNTER — TELEPHONE (OUTPATIENT)
Dept: SURGERY | Facility: CLINIC | Age: 28
End: 2023-01-04

## 2023-01-04 NOTE — TELEPHONE ENCOUNTER
Received phone call from the [facility], they said that patient's surgery scheduled on Friday 1/6/2023 must be rescheduled to an [facility] location due to patient's wound vac and other factors.     Called patient's mother/ guardian Chrisrakel [facility] for return call stating that we need to reschedule patient to a different date and unfortunately this means that his appt on 1/6/2023 will have to be canceled.    Could possibly reschedule patient's surgery with Dr. Helm to 1/19 at [facility] OR vs 1/31 (wait list) for surgery at the [facility] OR.    Temporarily rescheduling surgery to 1/19/2023 at [facility] OR with Dr. Helm in order to hold the spot.    Will confirm when patient's mother/guardian calls back.    Rhonda Grimaldo  Juanis-op Coordinator  [place]  Direct Phone: [phone]

## 2023-01-05 DIAGNOSIS — G40.909 SEIZURE DISORDER (H): Primary | ICD-10-CM

## 2023-01-05 NOTE — TELEPHONE ENCOUNTER
Left another  msg for patient's mom/guardian Chris to call back asap RE: rescheduling and the need for patient to complete missed appointments (Pre-op H&P with PAC). Patient's surgery tentatively rescheduled to 1/19/2023 with Dr. Helm at [place] OR pending confirmation.    Rhonda Grimaldo  Juanis-op Coordinator  [place]  Direct Phone: [phone]

## 2023-01-05 NOTE — TELEPHONE ENCOUNTER
FUTURE VISIT INFORMATION        SURGERY INFORMATION:    Date: 23    Location: uu or    Surgeon:  Carl Helm MD    Anesthesia Type:  MAC    Procedure: EXAM UNDER ANESTHESIA, ANUS, LAY OPEN PILONIDAL CYSTECTOMY , POSSIBLE WOUND VAC PLACEMENT     RECORDS REQUESTED FROM:         Primary Care Provider: [facility]     Most recent EKG+ Tracin18

## 2023-01-09 RX ORDER — LAMOTRIGINE 100 MG/1
200 TABLET ORAL 2 TIMES DAILY
Qty: 360 TABLET | Refills: 3 | Status: SHIPPED | OUTPATIENT
Start: 2023-01-09 | End: 2023-02-13

## 2023-01-10 ENCOUNTER — LAB (OUTPATIENT)
Dept: LAB | Facility: CLINIC | Age: 28
End: 2023-01-10
Payer: MEDICAID

## 2023-01-10 ENCOUNTER — OFFICE VISIT (OUTPATIENT)
Dept: FAMILY MEDICINE | Facility: CLINIC | Age: 28
End: 2023-01-10
Payer: MEDICAID

## 2023-01-10 VITALS
DIASTOLIC BLOOD PRESSURE: 68 MMHG | WEIGHT: 236.2 LBS | BODY MASS INDEX: 31.99 KG/M2 | SYSTOLIC BLOOD PRESSURE: 112 MMHG | TEMPERATURE: 97.3 F | HEIGHT: 72 IN | HEART RATE: 74 BPM | RESPIRATION RATE: 16 BRPM | OXYGEN SATURATION: 98 %

## 2023-01-10 DIAGNOSIS — G40.909 RECURRENT SEIZURES (H): ICD-10-CM

## 2023-01-10 DIAGNOSIS — Z79.899 HIGH RISK MEDICATION USE: Primary | ICD-10-CM

## 2023-01-10 DIAGNOSIS — Z79.899 ENCOUNTER FOR LONG-TERM (CURRENT) USE OF MEDICATIONS: Primary | ICD-10-CM

## 2023-01-10 LAB
ALBUMIN SERPL BCG-MCNC: 4.1 G/DL (ref 3.5–5.2)
ALP SERPL-CCNC: 52 U/L (ref 40–129)
ALT SERPL W P-5'-P-CCNC: 19 U/L (ref 10–50)
ANION GAP SERPL CALCULATED.3IONS-SCNC: 11 MMOL/L (ref 7–15)
AST SERPL W P-5'-P-CCNC: 19 U/L (ref 10–50)
BASOPHILS # BLD AUTO: 0 10E3/UL (ref 0–0.2)
BASOPHILS NFR BLD AUTO: 0 %
BILIRUB SERPL-MCNC: 0.4 MG/DL
BUN SERPL-MCNC: 17.9 MG/DL (ref 6–20)
CALCIUM SERPL-MCNC: 9.4 MG/DL (ref 8.6–10)
CHLORIDE SERPL-SCNC: 108 MMOL/L (ref 98–107)
CHOLEST SERPL-MCNC: 154 MG/DL
CREAT SERPL-MCNC: 1.13 MG/DL (ref 0.67–1.17)
DEPRECATED HCO3 PLAS-SCNC: 23 MMOL/L (ref 22–29)
EOSINOPHIL # BLD AUTO: 0.1 10E3/UL (ref 0–0.7)
EOSINOPHIL NFR BLD AUTO: 2 %
ERYTHROCYTE [DISTWIDTH] IN BLOOD BY AUTOMATED COUNT: 14.3 % (ref 10–15)
GFR SERPL CREATININE-BSD FRML MDRD: 90 ML/MIN/1.73M2
GLUCOSE SERPL-MCNC: 82 MG/DL (ref 70–99)
HBA1C MFR BLD: 4.8 % (ref 0–5.6)
HCT VFR BLD AUTO: 42.4 % (ref 40–53)
HDLC SERPL-MCNC: 46 MG/DL
HGB BLD-MCNC: 14.1 G/DL (ref 13.3–17.7)
LDLC SERPL CALC-MCNC: 92 MG/DL
LYMPHOCYTES # BLD AUTO: 2 10E3/UL (ref 0.8–5.3)
LYMPHOCYTES NFR BLD AUTO: 39 %
MCH RBC QN AUTO: 32.4 PG (ref 26.5–33)
MCHC RBC AUTO-ENTMCNC: 33.3 G/DL (ref 31.5–36.5)
MCV RBC AUTO: 98 FL (ref 78–100)
MONOCYTES # BLD AUTO: 0.6 10E3/UL (ref 0–1.3)
MONOCYTES NFR BLD AUTO: 11 %
NEUTROPHILS # BLD AUTO: 2.4 10E3/UL (ref 1.6–8.3)
NEUTROPHILS NFR BLD AUTO: 48 %
NONHDLC SERPL-MCNC: 108 MG/DL
PLATELET # BLD AUTO: 167 10E3/UL (ref 150–450)
POTASSIUM SERPL-SCNC: 4.3 MMOL/L (ref 3.4–5.3)
PROT SERPL-MCNC: 7 G/DL (ref 6.4–8.3)
RBC # BLD AUTO: 4.35 10E6/UL (ref 4.4–5.9)
SODIUM SERPL-SCNC: 142 MMOL/L (ref 136–145)
T4 FREE SERPL-MCNC: 1.05 NG/DL (ref 0.9–1.7)
TRIGL SERPL-MCNC: 80 MG/DL
TSH SERPL DL<=0.005 MIU/L-ACNC: 3.68 UIU/ML (ref 0.3–4.2)
VALPROATE SERPL-MCNC: 109.2 UG/ML
WBC # BLD AUTO: 5.1 10E3/UL (ref 4–11)

## 2023-01-10 PROCEDURE — 80050 GENERAL HEALTH PANEL: CPT

## 2023-01-10 PROCEDURE — 90471 IMMUNIZATION ADMIN: CPT | Performed by: NURSE PRACTITIONER

## 2023-01-10 PROCEDURE — 90686 IIV4 VACC NO PRSV 0.5 ML IM: CPT | Performed by: NURSE PRACTITIONER

## 2023-01-10 PROCEDURE — 90715 TDAP VACCINE 7 YRS/> IM: CPT | Performed by: NURSE PRACTITIONER

## 2023-01-10 PROCEDURE — 83036 HEMOGLOBIN GLYCOSYLATED A1C: CPT

## 2023-01-10 PROCEDURE — 36415 COLL VENOUS BLD VENIPUNCTURE: CPT

## 2023-01-10 PROCEDURE — 99213 OFFICE O/P EST LOW 20 MIN: CPT | Mod: 25 | Performed by: NURSE PRACTITIONER

## 2023-01-10 PROCEDURE — 80061 LIPID PANEL: CPT

## 2023-01-10 PROCEDURE — 93000 ELECTROCARDIOGRAM COMPLETE: CPT | Performed by: NURSE PRACTITIONER

## 2023-01-10 PROCEDURE — 80164 ASSAY DIPROPYLACETIC ACD TOT: CPT

## 2023-01-10 PROCEDURE — 80175 DRUG SCREEN QUAN LAMOTRIGINE: CPT | Mod: 90

## 2023-01-10 PROCEDURE — 90472 IMMUNIZATION ADMIN EACH ADD: CPT | Performed by: NURSE PRACTITIONER

## 2023-01-10 PROCEDURE — 84439 ASSAY OF FREE THYROXINE: CPT

## 2023-01-10 ASSESSMENT — PAIN SCALES - GENERAL: PAINLEVEL: NO PAIN (0)

## 2023-01-10 NOTE — PROGRESS NOTES
Assessment & Plan     High risk medication use  EKG completed per psychiatry request and is normal.  Faxed to [facility] provider.  - EKG 12-lead complete w/read - Clinics    Usha Fragoso NP  [facility]    Yvon Jolley is a 27 year old accompanied by his GUARDIAN, presenting for the following health issues:  Medication Follow-up (Needs ekg per psychiatrist/)      History of Present Illness       Reason for visit:  Ekg    He eats 2-3 servings of fruits and vegetables daily.He consumes 0 sweetened beverage(s) daily.He exercises with enough effort to increase his heart rate 9 or less minutes per day.  He exercises with enough effort to increase his heart rate 3 or less days per week.   He is taking medications regularly.     Review of Systems   CONSTITUTIONAL: NEGATIVE for fever, chills, change in weight  RESP: NEGATIVE for significant cough or SOB  CV: NEGATIVE for chest pain, palpitations or peripheral edema  PSYCHIATRIC: POSITIVE for baseline autism behavior  ROS otherwise negative      Objective    /68   Pulse 74   Temp 97.3  F (36.3  C) (Tympanic)   Resp 16   Ht 1.829 m (6')   Wt 107.1 kg (236 lb 3.2 oz)   SpO2 98%   BMI 32.03 kg/m    Body mass index is 32.03 kg/m .  Physical Exam   GENERAL: healthy, alert and no distress  RESP: lungs clear to auscultation - no rales, rhonchi or wheezes  CV: regular rate and rhythm, normal S1 S2, no S3 or S4, no murmur, click or rub, no peripheral edema and peripheral pulses strong  PSYCH: mentation appears normal, affect normal/bright    EKG - Reviewed and interpreted by me appears normal, NSR, normal axis, normal intervals, no acute ST/T changes c/w ischemia, no LVH by voltage criteria, unchanged from previous tracings

## 2023-01-10 NOTE — PROGRESS NOTES
Prior to immunization administration, verified patients identity using patient s name and date of birth. Please see Immunization Activity for additional information.     Screening Questionnaire for Adult Immunization    Are you sick today?   No   Do you have allergies to medications, food, a vaccine component or latex?   No   Have you ever had a serious reaction after receiving a vaccination?   No   Do you have a long-term health problem with heart, lung, kidney, or metabolic disease (e.g., diabetes), asthma, a blood disorder, no spleen, complement component deficiency, a cochlear implant, or a spinal fluid leak?  Are you on long-term aspirin therapy?   No   Do you have cancer, leukemia, HIV/AIDS, or any other immune system problem?   No   Do you have a parent, brother, or sister with an immune system problem?   No   In the past 3 months, have you taken medications that affect  your immune system, such as prednisone, other steroids, or anticancer drugs; drugs for the treatment of rheumatoid arthritis, Crohn s disease, or psoriasis; or have you had radiation treatments?   No   Have you had a seizure, or a brain or other nervous system problem?   No   During the past year, have you received a transfusion of blood or blood    products, or been given immune (gamma) globulin or antiviral drug?   No   For women: Are you pregnant or is there a chance you could become       pregnant during the next month?   No   Have you received any vaccinations in the past 4 weeks?   No     Immunization questionnaire answers were all negative.        Patient instructed to remain in clinic for 15 minutes afterwards, and to report any adverse reaction to me immediately.       Screening performed by Justen Orellana on 1/10/2023 at 10:30 AM.

## 2023-01-10 NOTE — TELEPHONE ENCOUNTER
Left another  msg for patient's mom/guardian Chris to call back asap RE: rescheduling and the need for patient to complete missed appointments (Pre-op H&P with PAC). Patient's surgery tentatively rescheduled to 1/19/2023 with Dr. Helm at [facility] OR pending confirmation. However, I told her that I cannot hold this date much longer, so I asked her to call back to reschedule this appt and related appts asap, and by then we may need to talk about different dates.    Rhonda Grimaldo  Juanis-op Coordinator  [place]  Direct Phone: [phone]

## 2023-01-11 DIAGNOSIS — Z11.4 SCREENING FOR HIV (HUMAN IMMUNODEFICIENCY VIRUS): ICD-10-CM

## 2023-01-11 DIAGNOSIS — Z11.59 NEED FOR HEPATITIS C SCREENING TEST: ICD-10-CM

## 2023-01-11 LAB — LAMOTRIGINE SERPL-MCNC: 19.6 UG/ML

## 2023-01-16 NOTE — TELEPHONE ENCOUNTER
Spoke with patient's guardian/mom Chris, confirmed the following scheduling info. Sent Surgery Packet to patient via [employer] and Mail including related scheduling information and instructions. Also forwarded medical question about a seizure in December and Seizure medication to Dr. Helm's RNCC Sara.    Your surgery is scheduled:    Date: Tuesday January 24, 2023  ________________________________    Time: 2:20 PM*  ________________________________    Please arrive at:  12:20 PM*  ______________________    Surgeon's Name:   - Dr. Carl Helm  _______________________      Pre-Op Physical Fax Numbers:         [facility] Pre-Admissions  [facility] Fax:  [phone] / Phone:  [phone]        Your surgery is located at:      [facility]      [facility] [address]C)      [place] [address]      [phone] [email or web address]     *Times are tentative and may change. You can expect a call from the pre-admission nurses to confirm arrival and surgery start times if the times should change.     Required: Yes, you will need a  18 years or older to drive you home from your procedure as well as stay with you for 24 hours after your procedure    Surgery: EXAM UNDER ANESTHESIA, ANUS, LAY OPEN PILONIDAL CYSTECTOMY , POSSIBLE WOUND VAC PLACEMENT    Upcoming Related Appointments:     Jan 18, 2023  9:00 AM  Pre-op Physical  (Arrive by 8:45 AM)  PAC EVALUATION with DEYSI Brown  [facility] Preoperative Assessment [facility] [place] ([facility] [place] ) [phone]    [address] [place] [address] 5th Floor [facility] [address]   Jan 18, 2023 10:30 AM  LAB with  LAB  [facility] Lab [facility] [place] ) [phone]    [address] [address]   Feb 06, 2023 10:30 AM  (Arrive by 10:15 AM)  Post Op with Citlali Monroe,  [facility]TE[facility] CRYSTAL [facility] [place] ([facility] ) [phone]    [address] [place] [address] 4th [facility] [address]      Bowel Preparation: NONE    Rhonda Grimaldo  Juanis-op Coordinator  [place]  Direct Phone: [phone]

## 2023-01-18 ENCOUNTER — OFFICE VISIT (OUTPATIENT)
Dept: SURGERY | Facility: CLINIC | Age: 28
End: 2023-01-18
Payer: MEDICAID

## 2023-01-18 ENCOUNTER — ANESTHESIA EVENT (OUTPATIENT)
Dept: SURGERY | Facility: CLINIC | Age: 28
End: 2023-01-18
Payer: MEDICAID

## 2023-01-18 ENCOUNTER — PRE VISIT (OUTPATIENT)
Dept: SURGERY | Facility: CLINIC | Age: 28
End: 2023-01-18

## 2023-01-18 VITALS
HEART RATE: 71 BPM | BODY MASS INDEX: 30.75 KG/M2 | OXYGEN SATURATION: 98 % | TEMPERATURE: 97.6 F | WEIGHT: 232 LBS | HEIGHT: 73 IN | DIASTOLIC BLOOD PRESSURE: 77 MMHG | SYSTOLIC BLOOD PRESSURE: 115 MMHG | RESPIRATION RATE: 16 BRPM

## 2023-01-18 DIAGNOSIS — Z01.818 PREOP EXAMINATION: Primary | ICD-10-CM

## 2023-01-18 DIAGNOSIS — L98.8 PILONIDAL DISEASE: ICD-10-CM

## 2023-01-18 PROCEDURE — 99204 OFFICE O/P NEW MOD 45 MIN: CPT | Performed by: CLINICAL NURSE SPECIALIST

## 2023-01-18 ASSESSMENT — PAIN SCALES - GENERAL: PAINLEVEL: NO PAIN (0)

## 2023-01-18 ASSESSMENT — ENCOUNTER SYMPTOMS
SEIZURES: 1
DYSRHYTHMIAS: 0

## 2023-01-18 ASSESSMENT — LIFESTYLE VARIABLES: TOBACCO_USE: 0

## 2023-01-18 NOTE — H&P
Pre-Operative H & P     CC:  Preoperative exam to assess for increased cardiopulmonary risk while undergoing surgery and anesthesia.    Date of Encounter: 2023  Primary Care Physician:  [facility]     Reason for visit:   Encounter Diagnoses   Name Primary?     Preop examination Yes     Pilonidal disease        HPI  Shola Owens Jr. is a 27 year old male who presents for pre-operative H & P in preparation for  Procedure Information     Case: [id number] Date/Time: 23 1420    Procedures:       EXAM UNDER ANESTHESIA,  POSSIBLE WOUND VAC PLACEMENT (Anus)      ANUS, LAY OPEN PILONIDAL CYSTECTOMY (Buttocks)    Anesthesia type: MAC    Diagnosis: Pilonidal disease [L98.8]    Pre-op diagnosis: Pilonidal disease [L98.8]    Location:  OR [address] [place] [address] OR    Providers: Carl Helm MD        History is obtained from the patient, mother, and chart review    Patient who was recently evaluated by Colon and Rectal surgery for a pilonidal disease with a cyst that had been draining for a few years intermittently. More recently drainage has been constant but he is not having pain. His exam revealed four large pilonidal pits in the  and gluteal cleft. He and his mother were counseled for above procedures.     His history is otherwise significant for childhood asthma, autism, and seizure disorder. He is followed in Neurology at [facility] by Dr. Lemus. His last seizure (X2) was on 22 causing fall, and he was evaluated in the ED. His Neurologist was contacted and his medications were adjusted. He has been stable since.       Hx of abnormal bleeding or anti-platelet use: Denies.       Past Medical History  Past Medical History:   Diagnosis Date     Acne     improved since .      Autistic disorder, current or active state      Mild persistent asthma      Pilonidal disease      Seasonal allergic rhinitis      Seizure disorder (H)        Past Surgical History  Past Surgical  History:   Procedure Laterality Date     MOUTH SURGERY       [place] teeth removed         Prior to Admission Medications  Current Outpatient Medications   Medication Sig Dispense Refill     divalproex sodium extended-release (DEPAKOTE ER) 500 MG 24 hr tablet Take 3 tablets (1,500 mg) by mouth 2 times daily 540 tablet 0     guanFACINE (TENEX) 1 MG tablet Take 1 mg by mouth 2 times daily       lamoTRIgine (LAMICTAL) 100 MG tablet Take 2 tablets (200 mg) by mouth 2 times daily 360 tablet 3     Multiple Vitamin (MULTIVITAMINS PO) Take by mouth every morning       OLANZapine (ZYPREXA) 5 MG tablet Take 5 mg by mouth as needed       ziprasidone (GEODON) 40 MG capsule Take 1 capsule (40 mg) by mouth 2 times daily (with meals) AM (Patient taking differently: Take 40 mg by mouth 2 times daily (with meals) Total 120 mg) 60 capsule 0     ziprasidone (GEODON) 80 MG capsule Take 1 capsule (80 mg) by mouth 2 times daily (with meals) AM AND PM (Patient taking differently: Take 80 mg by mouth 2 times daily (with meals) AM AND PM Total 120 mg) 60 capsule 0     zonisamide (ZONEGRAN) 100 MG capsule Take 1 capsule (100 mg) by mouth At Bedtime Take 100 mg at bedtime for 2 weeks then Take 200 mg at bedtime for 2 weeks then Take 300 mg at bedtime (Patient taking differently: Take 300 mg by mouth At Bedtime Take 100 mg at bedtime for 2 weeks then Take 200 mg at bedtime for 2 weeks then Take 300 mg at bedtime) 132 capsule 0     cetirizine (ZYRTEC) 10 MG tablet Take 1 tablet (10 mg) by mouth daily (Patient taking differently: Take 10 mg by mouth as needed) 90 tablet 3     montelukast (SINGULAIR) 10 MG tablet Take 1 tablet (10 mg) by mouth At Bedtime (Patient taking differently: Take 10 mg by mouth nightly as needed) 90 tablet 3       Allergies  Allergies   Allergen Reactions     Risperidone Hives       Social History  Social History     Socioeconomic History     Marital status: Single     Spouse name: Not on file     Number of children: Not  on file     Years of education: Not on file     Highest education level: Not on file   Occupational History     Not on file   Tobacco Use     Smoking status: Never     Smokeless tobacco: Never     Tobacco comments:     outside smokers-not in the house   Vaping Use     Vaping Use: Never used   Substance and Sexual Activity     Alcohol use: No     Alcohol/week: 0.0 standard drinks     Drug use: No     Sexual activity: Never     Comment: 01/09   Other Topics Concern     Parent/sibling w/ CABG, MI or angioplasty before 65F 55M? No   Social History Narrative     Not on file     Social Determinants of Health     Financial Resource Strain: Not on file   Food Insecurity: Not on file   Transportation Needs: Not on file   Physical Activity: Not on file   Stress: Not on file   Social Connections: Not on file   Intimate Partner Violence: Not on file   Housing Stability: Not on file       Family History  Family History   Problem Relation Age of Onset     Allergies Mother         seasonal     Hypertension Father      Post Operative Nausea and Vomiting Sister      Heart Disease Maternal Grandmother         A. Fib     Thyroid Disease Maternal Grandmother         hypo     Post Operative Nausea and Vomiting Maternal Grandmother      Cancer Maternal Grandfather         cancerous tumor in his arm     Hypertension Paternal Grandmother      Thyroid Disease Paternal Grandmother         hypo     Cancer Paternal Grandfather         passed from lung cancer     Hypertension Maternal Uncle      Post Operative Nausea and Vomiting Other         maternal great uncle     Clotting Disorder No family hx of        Review of Systems  The complete review of systems is negative other than noted in the HPI or here.   Anesthesia Evaluation   Pt has had prior anesthetic. Type: MAC.    History of anesthetic complications   Mild confusion upon waking.    ROS/MED HX  ENT/Pulmonary: Comment: Seasonal allergies  Childhood asthma, but no issues as an adult. No  "use of inhaler      (+) asthma  (-) tobacco use and recent URI   Neurologic: Comment: Autism-mother provides history-is legal guardian  Medications are sedating  Picks at things     (+) seizures, last seizure: 12/12/22, features: grand mal, Developmental delay,     Cardiovascular:     (+) -----Previous cardiac testing   Echo: Date: Results:    Stress Test: Date: Results:    ECG Reviewed: Date: 1/10/23 Results:  SR,  ms, QTcH 389 ms  Cath: Date: Results:   (-) taking anticoagulants/antiplatelets, ATKINS, arrhythmias and congenital heart disease   METS/Exercise Tolerance: >4 METS    Hematologic:    (-) history of blood clots and history of blood transfusion   Musculoskeletal:  - neg musculoskeletal ROS     GI/Hepatic: Comment: Pilonidal disease   (-) GERD   Renal/Genitourinary:  - neg Renal ROS     Endo:  - neg endo ROS     Psychiatric/Substance Use:  - neg psychiatric ROS  (-) psychiatric history   Infectious Disease:  - neg infectious disease ROS     Malignancy:  - neg malignancy ROS     Other:  - neg other ROS          /77 (BP Location: Right arm, Patient Position: Chair, Cuff Size: Adult Large)   Pulse 71   Temp 97.6  F (36.4  C) (Oral)   Resp 16   Ht 1.842 m (6' 0.5\")   Wt 105.2 kg (232 lb)   SpO2 98%   BMI 31.03 kg/m      Physical Exam   Constitutional: Drowsy in clinic after taking am meds, cooperative, no apparent distress, and appears younger than stated age. Mother provides history details.   Eyes: Pupils equal, round and reactive to light, extra ocular muscles intact, sclera clear, conjunctiva normal.   HENT: Normocephalic, oral pharynx with moist mucus membranes, good dentition. No goiter appreciated.   Respiratory: Clear to auscultation bilaterally, no crackles or wheezing. No cough or obvious dyspnea.  Cardiovascular: Regular rate and rhythm, normal S1 and S2, and no murmur noted. Carotids +2, no bruits. No edema. Palpable pulses to radial  DP and PT arteries.   GI: Normal bowel sounds, " soft, non-distended, non-tender, no masses palpated, no hepatosplenomegaly.   Lymph/Hematologic: No cervical lymphadenopathy and no supraclavicular lymphadenopathy.  Genitourinary: Deferred.   Skin: Warm and dry.    Musculoskeletal: Full ROM of neck. There is no redness, warmth, or swelling of the joints. Gross motor strength is normal.    Neurologic: Oriented to name, place and time. Answers a few questions slowly. Moves slowly. Cranial nerves II-XII are grossly intact.   Neuropsychiatric: Calm, cooperative. Flat affect.     Prior Labs/Diagnostic Studies   All labs and imaging personally reviewed   Lab Results   Component Value Date    WBC 5.1 01/10/2023    WBC 7.9 02/11/2020     Lab Results   Component Value Date    RBC 4.35 01/10/2023    RBC 5.07 02/11/2020     Lab Results   Component Value Date    HGB 14.1 01/10/2023    HGB 15.2 02/11/2020     Lab Results   Component Value Date    HCT 42.4 01/10/2023    HCT 45.9 02/11/2020     Lab Results   Component Value Date    MCV 98 01/10/2023    MCV 91 02/11/2020     Lab Results   Component Value Date    MCH 32.4 01/10/2023    MCH 30.0 02/11/2020     Lab Results   Component Value Date    MCHC 33.3 01/10/2023    MCHC 33.1 02/11/2020     Lab Results   Component Value Date    RDW 14.3 01/10/2023    RDW 13.1 02/11/2020     Lab Results   Component Value Date     01/10/2023     03/30/2021     Last Comprehensive Metabolic Panel:  Sodium   Date Value Ref Range Status   01/10/2023 142 136 - 145 mmol/L Final   02/11/2020 140 133 - 144 mmol/L Final     Potassium   Date Value Ref Range Status   01/10/2023 4.3 3.4 - 5.3 mmol/L Final   07/20/2021 4.6 3.4 - 5.3 mmol/L Final   02/11/2020 4.2 3.4 - 5.3 mmol/L Final     Chloride   Date Value Ref Range Status   01/10/2023 108 (H) 98 - 107 mmol/L Final   07/20/2021 109 94 - 109 mmol/L Final   02/11/2020 106 94 - 109 mmol/L Final     Carbon Dioxide   Date Value Ref Range Status   02/11/2020 29 20 - 32 mmol/L Final     Carbon  Dioxide (CO2)   Date Value Ref Range Status   01/10/2023 23 22 - 29 mmol/L Final   07/20/2021 27 20 - 32 mmol/L Final     Anion Gap   Date Value Ref Range Status   01/10/2023 11 7 - 15 mmol/L Final   07/20/2021 7 3 - 14 mmol/L Final   02/11/2020 5 3 - 14 mmol/L Final     Glucose   Date Value Ref Range Status   01/10/2023 82 70 - 99 mg/dL Final   07/20/2021 89 70 - 99 mg/dL Final   02/11/2020 80 70 - 99 mg/dL Final     Urea Nitrogen   Date Value Ref Range Status   01/10/2023 17.9 6.0 - 20.0 mg/dL Final   07/20/2021 24 7 - 30 mg/dL Final   02/11/2020 18 7 - 30 mg/dL Final     Creatinine   Date Value Ref Range Status   01/10/2023 1.13 0.67 - 1.17 mg/dL Final   02/11/2020 0.90 0.66 - 1.25 mg/dL Final     GFR Estimate   Date Value Ref Range Status   01/10/2023 90 >60 mL/min/1.73m2 Final     Comment:     Effective December 21, 2021 eGFRcr in adults is calculated using the 2021 CKD-EPI creatinine equation which includes age and gender (Yareli et al., [place], DOI: 10.1056/OAXWpv2846066)   02/11/2020 >90 >60 mL/min/[1.73_m2] Final     Comment:     Non  GFR Calc  Starting 12/18/2018, serum creatinine based estimated GFR (eGFR) will be   calculated using the Chronic Kidney Disease Epidemiology Collaboration   (CKD-EPI) equation.       Calcium   Date Value Ref Range Status   01/10/2023 9.4 8.6 - 10.0 mg/dL Final   02/11/2020 9.4 8.5 - 10.1 mg/dL Final     Bilirubin Total   Date Value Ref Range Status   01/10/2023 0.4 <=1.2 mg/dL Final   02/11/2020 0.4 0.2 - 1.3 mg/dL Final     Alkaline Phosphatase   Date Value Ref Range Status   01/10/2023 52 40 - 129 U/L Final   02/11/2020 50 40 - 150 U/L Final     ALT   Date Value Ref Range Status   01/10/2023 19 10 - 50 U/L Final   02/11/2020 26 0 - 70 U/L Final     AST   Date Value Ref Range Status   01/10/2023 19 10 - 50 U/L Final   03/30/2021 16 0 - 45 U/L Final     A1c 4.8  TSH 3.68    EKG 1/10/23 Sinus rhythm,  ms, QTcH 380 ms    CT Head 12/12/22                       "                                                IMPRESSION: Normal head CT.      The patient's records and results personally reviewed by this provider.     Outside records reviewed from: Care Everywhere      Assessment      Shola Owens Jr. is a 27 year old male seen as a PAC referral for risk assessment and optimization for anesthesia.    Plan/Recommendations  Pt will be optimized for the proposed procedure.  See below for details on the assessment, risk, and preoperative recommendations    Unknown history of general anesthesia. Has had dental procedures. No personal history of problems with anesthesia. Has awakened mildly confused.   Strong family history of PONV.     Routine medications are sedating. May present with drowsiness.     Mother provides care, support, and history for patient. She is legal guardian.     Patient is \"\" and mother is concerned about IVs and surgical site.      NEUROLOGY  Seizure disorder, followed by Neurology with recent medication adjustment after grand mal seizure X 2 on 12/12/22. None since. Some balance issues with increased dosages.  Autism. Patient answers a few questions slowly and with short answers.   Patient will take am meds with sips of water on DOS. Depakote, guanfacine, lamictal, and Geodon.  Zyprexa prn  Zonegran at HS    -Post Op delirium risk factors: Past history of confusion upon awaking    ENT  - No current airway concerns.  Will need to be reassessed day of surgery.  Mallampati: I  TM: > 3   Increased oral secretions attributed to autism per mother.     CARDIAC  No known cardiac history, symptoms or meds. EKG monitored due to psych meds, last 1/10/23 with QTcH 380 ms. Mother denies history of congenital heart defects. Able to jump on trampoline and be physically active.    - METS (Metabolic Equivalents)>4    RCRI: 0.4% risk of serious cardiac events      PULMONARY  History of childhood asthma but no issues or use of inhaler as adult  History of seasonal " "allergies, but is not taking allergy medication at this time.     Low risk for OMER  - Tobacco History      History   Smoking Status     Never   Smokeless Tobacco     Never       GI: Denies GERD symptoms  PONV Low Risk  Total Score: 1           1 AN PONV: Patient is not a current smoker        /RENAL  - Baseline Creatinine 1.13    ENDOCRINE    - BMI: Estimated body mass index is 31.03 kg/m  as calculated from the following:    Height as of this encounter: 1.842 m (6' 0.5\").    Weight as of this encounter: 105.2 kg (232 lb).  Obesity (BMI >30)  - No history of Diabetes Mellitus Last A1C 4.8  TSH 3.68    HEME  VTE Low Risk 0.26%            Total Score: 0      Denies personal or family history of blood clots  Denies history of blood transfusion    PSYCH  - Very calm and cooperative today, and mother reports that he tolerates medical procedures without difficulty.   - Picks at things as above.     Different anesthesia methods/types have been discussed with the patient/mother, but they are aware that the final plan will be decided by the assigned anesthesia provider on the date of service.    The patient is optimized for their procedure. AVS with information on surgery time/arrival time, meds and NPO status given by nursing staff. No further diagnostic testing indicated.      On the day of service:     Prep time: 15 minutes  Visit time: 19 minutes  Documentation time: 20 minutes  ------------------------------------------  Total time: 54 minutes      DEYSI Brown CNS  Preoperative Assessment Center  [facility]  [facility]  Phone: [phone]  Fax: [phone]  "

## 2023-01-18 NOTE — H&P (VIEW-ONLY)
Pre-Operative H & P     CC:  Preoperative exam to assess for increased cardiopulmonary risk while undergoing surgery and anesthesia.    Date of Encounter: 2023  Primary Care Physician:  BONILLA Grove [facility]     Reason for visit:   Encounter Diagnoses   Name Primary?     Preop examination Yes     Pilonidal disease        HPI  Shola Owens Jr. is a 27 year old male who presents for pre-operative H & P in preparation for  Procedure Information     Case: [id number] Date/Time: 23 1420    Procedures:       EXAM UNDER ANESTHESIA,  POSSIBLE WOUND VAC PLACEMENT (Anus)      ANUS, LAY OPEN PILONIDAL CYSTECTOMY (Buttocks)    Anesthesia type: MAC    Diagnosis: Pilonidal disease [L98.8]    Pre-op diagnosis: Pilonidal disease [L98.8]    Location:  OR [address] [place] [address] OR    Providers: Carl Helm MD        History is obtained from the patient, mother, and chart review    Patient who was recently evaluated by Colon and Rectal surgery for a pilonidal disease with a cyst that had been draining for a few years intermittently. More recently drainage has been constant but he is not having pain. His exam revealed four large pilonidal pits in the  and gluteal cleft. He and his mother were counseled for above procedures.     His history is otherwise significant for childhood asthma, autism, and seizure disorder. He is followed in Neurology at [facility] by Dr. Lemus. His last seizure (X2) was on 22 causing fall, and he was evaluated in the ED. His Neurologist was contacted and his medications were adjusted. He has been stable since.       Hx of abnormal bleeding or anti-platelet use: Denies.       Past Medical History  Past Medical History:   Diagnosis Date     Acne     improved since .      Autistic disorder, current or active state      Mild persistent asthma      Pilonidal disease      Seasonal allergic rhinitis      Seizure disorder (H)        Past Surgical History  Past Surgical  History:   Procedure Laterality Date     MOUTH SURGERY       [place] teeth removed         Prior to Admission Medications  Current Outpatient Medications   Medication Sig Dispense Refill     divalproex sodium extended-release (DEPAKOTE ER) 500 MG 24 hr tablet Take 3 tablets (1,500 mg) by mouth 2 times daily 540 tablet 0     guanFACINE (TENEX) 1 MG tablet Take 1 mg by mouth 2 times daily       lamoTRIgine (LAMICTAL) 100 MG tablet Take 2 tablets (200 mg) by mouth 2 times daily 360 tablet 3     Multiple Vitamin (MULTIVITAMINS PO) Take by mouth every morning       OLANZapine (ZYPREXA) 5 MG tablet Take 5 mg by mouth as needed       ziprasidone (GEODON) 40 MG capsule Take 1 capsule (40 mg) by mouth 2 times daily (with meals) AM (Patient taking differently: Take 40 mg by mouth 2 times daily (with meals) Total 120 mg) 60 capsule 0     ziprasidone (GEODON) 80 MG capsule Take 1 capsule (80 mg) by mouth 2 times daily (with meals) AM AND PM (Patient taking differently: Take 80 mg by mouth 2 times daily (with meals) AM AND PM Total 120 mg) 60 capsule 0     zonisamide (ZONEGRAN) 100 MG capsule Take 1 capsule (100 mg) by mouth At Bedtime Take 100 mg at bedtime for 2 weeks then Take 200 mg at bedtime for 2 weeks then Take 300 mg at bedtime (Patient taking differently: Take 300 mg by mouth At Bedtime Take 100 mg at bedtime for 2 weeks then Take 200 mg at bedtime for 2 weeks then Take 300 mg at bedtime) 132 capsule 0     cetirizine (ZYRTEC) 10 MG tablet Take 1 tablet (10 mg) by mouth daily (Patient taking differently: Take 10 mg by mouth as needed) 90 tablet 3     montelukast (SINGULAIR) 10 MG tablet Take 1 tablet (10 mg) by mouth At Bedtime (Patient taking differently: Take 10 mg by mouth nightly as needed) 90 tablet 3       Allergies  Allergies   Allergen Reactions     Risperidone Hives       Social History  Social History     Socioeconomic History     Marital status: Single     Spouse name: Not on file     Number of children: Not  on file     Years of education: Not on file     Highest education level: Not on file   Occupational History     Not on file   Tobacco Use     Smoking status: Never     Smokeless tobacco: Never     Tobacco comments:     outside smokers-not in the house   Vaping Use     Vaping Use: Never used   Substance and Sexual Activity     Alcohol use: No     Alcohol/week: 0.0 standard drinks     Drug use: No     Sexual activity: Never     Comment: 01/09   Other Topics Concern     Parent/sibling w/ CABG, MI or angioplasty before 65F 55M? No   Social History Narrative     Not on file     Social Determinants of Health     Financial Resource Strain: Not on file   Food Insecurity: Not on file   Transportation Needs: Not on file   Physical Activity: Not on file   Stress: Not on file   Social Connections: Not on file   Intimate Partner Violence: Not on file   Housing Stability: Not on file       Family History  Family History   Problem Relation Age of Onset     Allergies Mother         seasonal     Hypertension Father      Post Operative Nausea and Vomiting Sister      Heart Disease Maternal Grandmother         A. Fib     Thyroid Disease Maternal Grandmother         hypo     Post Operative Nausea and Vomiting Maternal Grandmother      Cancer Maternal Grandfather         cancerous tumor in his arm     Hypertension Paternal Grandmother      Thyroid Disease Paternal Grandmother         hypo     Cancer Paternal Grandfather         passed from lung cancer     Hypertension Maternal Uncle      Post Operative Nausea and Vomiting Other         maternal great uncle     Clotting Disorder No family hx of        Review of Systems  The complete review of systems is negative other than noted in the HPI or here.   Anesthesia Evaluation   Pt has had prior anesthetic. Type: MAC.    History of anesthetic complications   Mild confusion upon waking.    ROS/MED HX  ENT/Pulmonary: Comment: Seasonal allergies  Childhood asthma, but no issues as an adult. No  "use of inhaler      (+) asthma  (-) tobacco use and recent URI   Neurologic: Comment: Autism-mother provides history-is legal guardian  Medications are sedating  Picks at things     (+) seizures, last seizure: 12/12/22, features: grand mal, Developmental delay,     Cardiovascular:     (+) -----Previous cardiac testing   Echo: Date: Results:    Stress Test: Date: Results:    ECG Reviewed: Date: 1/10/23 Results:  SR,  ms, QTcH 389 ms  Cath: Date: Results:   (-) taking anticoagulants/antiplatelets, ATKINS, arrhythmias and congenital heart disease   METS/Exercise Tolerance: >4 METS    Hematologic:    (-) history of blood clots and history of blood transfusion   Musculoskeletal:  - neg musculoskeletal ROS     GI/Hepatic: Comment: Pilonidal disease   (-) GERD   Renal/Genitourinary:  - neg Renal ROS     Endo:  - neg endo ROS     Psychiatric/Substance Use:  - neg psychiatric ROS  (-) psychiatric history   Infectious Disease:  - neg infectious disease ROS     Malignancy:  - neg malignancy ROS     Other:  - neg other ROS          /77 (BP Location: Right arm, Patient Position: Chair, Cuff Size: Adult Large)   Pulse 71   Temp 97.6  F (36.4  C) (Oral)   Resp 16   Ht 1.842 m (6' 0.5\")   Wt 105.2 kg (232 lb)   SpO2 98%   BMI 31.03 kg/m      Physical Exam   Constitutional: Drowsy in clinic after taking am meds, cooperative, no apparent distress, and appears younger than stated age. Mother provides history details.   Eyes: Pupils equal, round and reactive to light, extra ocular muscles intact, sclera clear, conjunctiva normal.   HENT: Normocephalic, oral pharynx with moist mucus membranes, good dentition. No goiter appreciated.   Respiratory: Clear to auscultation bilaterally, no crackles or wheezing. No cough or obvious dyspnea.  Cardiovascular: Regular rate and rhythm, normal S1 and S2, and no murmur noted. Carotids +2, no bruits. No edema. Palpable pulses to radial  DP and PT arteries.   GI: Normal bowel sounds, " soft, non-distended, non-tender, no masses palpated, no hepatosplenomegaly.   Lymph/Hematologic: No cervical lymphadenopathy and no supraclavicular lymphadenopathy.  Genitourinary: Deferred.   Skin: Warm and dry.    Musculoskeletal: Full ROM of neck. There is no redness, warmth, or swelling of the joints. Gross motor strength is normal.    Neurologic: Oriented to name, place and time. Answers a few questions slowly. Moves slowly. Cranial nerves II-XII are grossly intact.   Neuropsychiatric: Calm, cooperative. Flat affect.     Prior Labs/Diagnostic Studies   All labs and imaging personally reviewed   Lab Results   Component Value Date    WBC 5.1 01/10/2023    WBC 7.9 02/11/2020     Lab Results   Component Value Date    RBC 4.35 01/10/2023    RBC 5.07 02/11/2020     Lab Results   Component Value Date    HGB 14.1 01/10/2023    HGB 15.2 02/11/2020     Lab Results   Component Value Date    HCT 42.4 01/10/2023    HCT 45.9 02/11/2020     Lab Results   Component Value Date    MCV 98 01/10/2023    MCV 91 02/11/2020     Lab Results   Component Value Date    MCH 32.4 01/10/2023    MCH 30.0 02/11/2020     Lab Results   Component Value Date    MCHC 33.3 01/10/2023    MCHC 33.1 02/11/2020     Lab Results   Component Value Date    RDW 14.3 01/10/2023    RDW 13.1 02/11/2020     Lab Results   Component Value Date     01/10/2023     03/30/2021     Last Comprehensive Metabolic Panel:  Sodium   Date Value Ref Range Status   01/10/2023 142 136 - 145 mmol/L Final   02/11/2020 140 133 - 144 mmol/L Final     Potassium   Date Value Ref Range Status   01/10/2023 4.3 3.4 - 5.3 mmol/L Final   07/20/2021 4.6 3.4 - 5.3 mmol/L Final   02/11/2020 4.2 3.4 - 5.3 mmol/L Final     Chloride   Date Value Ref Range Status   01/10/2023 108 (H) 98 - 107 mmol/L Final   07/20/2021 109 94 - 109 mmol/L Final   02/11/2020 106 94 - 109 mmol/L Final     Carbon Dioxide   Date Value Ref Range Status   02/11/2020 29 20 - 32 mmol/L Final     Carbon  Dioxide (CO2)   Date Value Ref Range Status   01/10/2023 23 22 - 29 mmol/L Final   07/20/2021 27 20 - 32 mmol/L Final     Anion Gap   Date Value Ref Range Status   01/10/2023 11 7 - 15 mmol/L Final   07/20/2021 7 3 - 14 mmol/L Final   02/11/2020 5 3 - 14 mmol/L Final     Glucose   Date Value Ref Range Status   01/10/2023 82 70 - 99 mg/dL Final   07/20/2021 89 70 - 99 mg/dL Final   02/11/2020 80 70 - 99 mg/dL Final     Urea Nitrogen   Date Value Ref Range Status   01/10/2023 17.9 6.0 - 20.0 mg/dL Final   07/20/2021 24 7 - 30 mg/dL Final   02/11/2020 18 7 - 30 mg/dL Final     Creatinine   Date Value Ref Range Status   01/10/2023 1.13 0.67 - 1.17 mg/dL Final   02/11/2020 0.90 0.66 - 1.25 mg/dL Final     GFR Estimate   Date Value Ref Range Status   01/10/2023 90 >60 mL/min/1.73m2 Final     Comment:     Effective December 21, 2021 eGFRcr in adults is calculated using the 2021 CKD-EPI creatinine equation which includes age and gender (Yareli et al., NE, DOI: 10.1056/NQSXft0004319)   02/11/2020 >90 >60 mL/min/[1.73_m2] Final     Comment:     Non  GFR Calc  Starting 12/18/2018, serum creatinine based estimated GFR (eGFR) will be   calculated using the Chronic Kidney Disease Epidemiology Collaboration   (CKD-EPI) equation.       Calcium   Date Value Ref Range Status   01/10/2023 9.4 8.6 - 10.0 mg/dL Final   02/11/2020 9.4 8.5 - 10.1 mg/dL Final     Bilirubin Total   Date Value Ref Range Status   01/10/2023 0.4 <=1.2 mg/dL Final   02/11/2020 0.4 0.2 - 1.3 mg/dL Final     Alkaline Phosphatase   Date Value Ref Range Status   01/10/2023 52 40 - 129 U/L Final   02/11/2020 50 40 - 150 U/L Final     ALT   Date Value Ref Range Status   01/10/2023 19 10 - 50 U/L Final   02/11/2020 26 0 - 70 U/L Final     AST   Date Value Ref Range Status   01/10/2023 19 10 - 50 U/L Final   03/30/2021 16 0 - 45 U/L Final     A1c 4.8  TSH 3.68    EKG 1/10/23 Sinus rhythm,  ms, QTcH 380 ms    CT Head 12/12/22                       "                                                IMPRESSION: Normal head CT.      The patient's records and results personally reviewed by this provider.     Outside records reviewed from: Care Everywhere      Assessment      Shola Owens Jr. is a 27 year old male seen as a PAC referral for risk assessment and optimization for anesthesia.    Plan/Recommendations  Pt will be optimized for the proposed procedure.  See below for details on the assessment, risk, and preoperative recommendations    Unknown history of general anesthesia. Has had dental procedures. No personal history of problems with anesthesia. Has awakened mildly confused.   Strong family history of PONV.     Routine medications are sedating. May present with drowsiness.     Mother provides care, support, and history for patient. She is legal guardian.     Patient is \"\" and mother is concerned about IVs and surgical site.      NEUROLOGY  Seizure disorder, followed by Neurology with recent medication adjustment after grand mal seizure X 2 on 12/12/22. None since. Some balance issues with increased dosages.  Autism. Patient answers a few questions slowly and with short answers.   Patient will take am meds with sips of water on DOS. Depakote, guanfacine, lamictal, and Geodon.  Zyprexa prn  Zonegran at HS    -Post Op delirium risk factors: Past history of confusion upon awaking    ENT  - No current airway concerns.  Will need to be reassessed day of surgery.  Mallampati: I  TM: > 3   Increased oral secretions attributed to autism per mother.     CARDIAC  No known cardiac history, symptoms or meds. EKG monitored due to psych meds, last 1/10/23 with QTcH 380 ms. Mother denies history of congenital heart defects. Able to jump on trampoline and be physically active.    - METS (Metabolic Equivalents)>4    RCRI: 0.4% risk of serious cardiac events      PULMONARY  History of childhood asthma but no issues or use of inhaler as adult  History of seasonal " "allergies, but is not taking allergy medication at this time.     Low risk for OMER  - Tobacco History      History   Smoking Status     Never   Smokeless Tobacco     Never       GI: Denies GERD symptoms  PONV Low Risk  Total Score: 1           1 AN PONV: Patient is not a current smoker        /RENAL  - Baseline Creatinine 1.13    ENDOCRINE    - BMI: Estimated body mass index is 31.03 kg/m  as calculated from the following:    Height as of this encounter: 1.842 m (6' 0.5\").    Weight as of this encounter: 105.2 kg (232 lb).  Obesity (BMI >30)  - No history of Diabetes Mellitus Last A1C 4.8  TSH 3.68    HEME  VTE Low Risk 0.26%            Total Score: 0      Denies personal or family history of blood clots  Denies history of blood transfusion    PSYCH  - Very calm and cooperative today, and mother reports that he tolerates medical procedures without difficulty.   - Picks at things as above.     Different anesthesia methods/types have been discussed with the patient/mother, but they are aware that the final plan will be decided by the assigned anesthesia provider on the date of service.    The patient is optimized for their procedure. AVS with information on surgery time/arrival time, meds and NPO status given by nursing staff. No further diagnostic testing indicated.      On the day of service:     Prep time: 15 minutes  Visit time: 19 minutes  Documentation time: 20 minutes  ------------------------------------------  Total time: 54 minutes      DEYSI Brown CNS  Preoperative Assessment Center  [facility]  Phone: [phone]  Fax: [phone]  "

## 2023-01-18 NOTE — PATIENT INSTRUCTIONS
Preparing for Your Surgery      Name:  Shola Owens Jr.   MRN:  [id number]   :  1995   Today's Date:  2023       Arriving for surgery:  Surgery date:  23  Arrival time:  12:20 pm     Surgeries and procedures: Adult patients can have 2 visitors all through the surgery process.   Visiting hours: 8 a.m. to 8:30 p.m.   Hospital: Adult patients and children under age 18 can have 4 visitor at a time     No visitors under the age of 5 are allowed for hospital patients.  Double occupancy rooms: Patients can have only two visitors at a time.   Patients with disabilities: Can have a support person with them (family member, service provider     Or someone well informed about their needs) plus the allowed number of visitors   Patients confirmed or suspected to have symptoms of COVID 19 or flu:     No visitors allowed for adult patients.   Children (under age 18) can have 1 named visitor.   People who are sick or showing symptoms of COVID 19 or flu:    Are not allowed to visit patients--we can only make exceptions in special situations.     Please follow these guidelines for your visit:   Arrive wearing a mask over your mouth and nose; we will give you a medical mask to wear    If you arrive wearing a cloth mask.   Keep it on during your entire visit, even when in patient's room.   If you don't wear a mask we'll ask you to leave.   Clean your hands with alcohol hand . Do this when you arrive at and leave the building and patient room,    And again after you touch your mask or anything in the room.   You can t visit if you have a fever, cough, shortness of breath, muscle aches, headaches, sore throat    Or diarrhea    Stay 6 feet away from others during your visit and between visits   Go directly to and from the room you are visiting.   Stay in the patient s room during your visit. Limit going to other places in the hospital as much as possible   Leave bags and jackets at home or in the car.   For  everyone s health, please don t come and go during your visit. That includes for smoking   during your visit.     Please come to:     [facility] [address]  [address] [place]  [address]    - ? parking is available in front of the hospital      -   Parking is available in the Patient Visitor Ramp on [address] and [facility].     -   When entering the hospital you will be asked COVID screening questions, you will then be directed to Registration.  Registration will direct you to the 3rd floor Surgery waiting room.     -   Please ask if you need an escort or a wheelchair to the Surgery Waiting Room.  Preop number- [phone]      -    Please proceed to [address] on the 3rd floor. [phone]?     - ?If you are in need of directions, wheelchair or escort please stop at the Information Desk in the lobby.  Inform the information person that you are here for surgery; a wheelchair and escort to [address] will be provided.?     What can I eat or drink?  -  You may eat and drink normally up to 8 hours prior to arrival time. (Until 4:20 am on 1/24/23)  -  You may have clear liquids until 2 hours prior to arrival time. (Until 10:20 am on 1/24/23)    Examples of clear liquids:  Water  Clear broth  Juices (apple, white grape, white cranberry  and cider) without pulp  Noncarbonated, powder based beverages  (lemonade and Rocco-Aid)  Sodas (Sprite, 7-Up, ginger ale and seltzer)  Coffee or tea (without milk or cream)  Gatorade    -  No Alcohol for at least 24 hours before surgery.     Which medicines can I take?    Hold Aspirin for 7 days before surgery.   Hold Multivitamins for 7 days before surgery.  Hold Supplements for 7 days before surgery.  Hold Ibuprofen (Advil, Motrin) for 1 day before surgery--unless otherwise directed by surgeon.  Hold Naproxen (Aleve) for 4 days before surgery.      -  DO NOT take these medications the day of surgery:  Multivitamin -  stop 7 days before surgery    -  PLEASE TAKE these medications the day of surgery or the night before if that is your usual schedule:  Divalproex (Depakote)  Guanfacine (Tenex)  Lamotrigine (Lamictal)  Olanzapine (Zyprexa)  Ziprasidone (Geodon)  Zonisamide (Zonegran)    How do I prepare myself?  - Please take 2 showers before surgery using Scrubcare or Hibiclens soap.    Use this soap only from the neck to your toes.     Leave the soap on your skin for one minute--then rinse thoroughly.      You may use your own shampoo and conditioner. No other hair products.   - Please remove all jewelry and body piercings.  - No lotions, deodorants or fragrance.  - No makeup or fingernail polish.   - Bring your ID and insurance card.    ALL PATIENTS GOING HOME THE SAME DAY OF SURGERY ARE REQUIRED TO HAVE A RESPONSIBLE ADULT TO DRIVE AND BE IN ATTENDANCE WITH THEM FOR 24 HOURS FOLLOWING SURGERY.      Questions or Concerns:    - For any questions regarding the day of surgery or your hospital stay, please contact the Pre Admission Nursing Office at [phone].     - If you have health changes between today and your surgery, please call your surgeon.     - For questions after surgery, please call your surgeons office.

## 2023-01-24 ENCOUNTER — HOSPITAL ENCOUNTER (OUTPATIENT)
Facility: CLINIC | Age: 28
Discharge: HOME | End: 2023-01-24
Attending: COLON & RECTAL SURGERY | Admitting: COLON & RECTAL SURGERY
Payer: MEDICAID

## 2023-01-24 ENCOUNTER — ANESTHESIA (OUTPATIENT)
Dept: SURGERY | Facility: CLINIC | Age: 28
End: 2023-01-24
Payer: MEDICAID

## 2023-01-24 VITALS
HEART RATE: 67 BPM | HEIGHT: 73 IN | BODY MASS INDEX: 30.62 KG/M2 | WEIGHT: 231.04 LBS | RESPIRATION RATE: 12 BRPM | SYSTOLIC BLOOD PRESSURE: 122 MMHG | DIASTOLIC BLOOD PRESSURE: 87 MMHG | TEMPERATURE: 96.8 F | OXYGEN SATURATION: 99 %

## 2023-01-24 DIAGNOSIS — F84.0 ACTIVE AUTISTIC DISORDER: ICD-10-CM

## 2023-01-24 DIAGNOSIS — L98.8 PILONIDAL DISEASE: Primary | ICD-10-CM

## 2023-01-24 PROCEDURE — 250N000011 HC RX IP 250 OP 636: Performed by: COLON & RECTAL SURGERY

## 2023-01-24 PROCEDURE — 710N000012 HC RECOVERY PHASE 2, PER MINUTE: Performed by: COLON & RECTAL SURGERY

## 2023-01-24 PROCEDURE — 258N000003 HC RX IP 258 OP 636: Performed by: NURSE ANESTHETIST, CERTIFIED REGISTERED

## 2023-01-24 PROCEDURE — 360N000075 HC SURGERY LEVEL 2, PER MIN: Performed by: COLON & RECTAL SURGERY

## 2023-01-24 PROCEDURE — 999N000141 HC STATISTIC PRE-PROCEDURE NURSING ASSESSMENT: Performed by: COLON & RECTAL SURGERY

## 2023-01-24 PROCEDURE — 250N000009 HC RX 250: Performed by: NURSE ANESTHETIST, CERTIFIED REGISTERED

## 2023-01-24 PROCEDURE — 370N000017 HC ANESTHESIA TECHNICAL FEE, PER MIN: Performed by: COLON & RECTAL SURGERY

## 2023-01-24 PROCEDURE — 250N000011 HC RX IP 250 OP 636: Performed by: NURSE ANESTHETIST, CERTIFIED REGISTERED

## 2023-01-24 PROCEDURE — 11771 EXC PILONIDAL CYST XTNSV: CPT | Performed by: COLON & RECTAL SURGERY

## 2023-01-24 PROCEDURE — 250N000013 HC RX MED GY IP 250 OP 250 PS 637: Performed by: COLON & RECTAL SURGERY

## 2023-01-24 PROCEDURE — 272N000001 HC OR GENERAL SUPPLY STERILE: Performed by: COLON & RECTAL SURGERY

## 2023-01-24 PROCEDURE — 710N000010 HC RECOVERY PHASE 1, LEVEL 2, PER MIN: Performed by: COLON & RECTAL SURGERY

## 2023-01-24 PROCEDURE — 250N000025 HC SEVOFLURANE, PER MIN: Performed by: COLON & RECTAL SURGERY

## 2023-01-24 RX ORDER — PROPOFOL 10 MG/ML
INJECTION, EMULSION INTRAVENOUS CONTINUOUS PRN
Status: DISCONTINUED | OUTPATIENT
Start: 2023-01-24 | End: 2023-01-24

## 2023-01-24 RX ORDER — OXYCODONE HYDROCHLORIDE 5 MG/1
5-10 TABLET ORAL EVERY 6 HOURS PRN
Qty: 25 TABLET | Refills: 0 | Status: SHIPPED | OUTPATIENT
Start: 2023-01-24 | End: 2023-02-13

## 2023-01-24 RX ORDER — FENTANYL CITRATE 50 UG/ML
INJECTION, SOLUTION INTRAMUSCULAR; INTRAVENOUS PRN
Status: DISCONTINUED | OUTPATIENT
Start: 2023-01-24 | End: 2023-01-24

## 2023-01-24 RX ORDER — HYDROMORPHONE HYDROCHLORIDE 1 MG/ML
0.2 INJECTION, SOLUTION INTRAMUSCULAR; INTRAVENOUS; SUBCUTANEOUS EVERY 5 MIN PRN
Status: DISCONTINUED | OUTPATIENT
Start: 2023-01-24 | End: 2023-01-24 | Stop reason: HOSPADM

## 2023-01-24 RX ORDER — SODIUM CHLORIDE, SODIUM LACTATE, POTASSIUM CHLORIDE, CALCIUM CHLORIDE 600; 310; 30; 20 MG/100ML; MG/100ML; MG/100ML; MG/100ML
INJECTION, SOLUTION INTRAVENOUS CONTINUOUS
Status: DISCONTINUED | OUTPATIENT
Start: 2023-01-24 | End: 2023-01-24 | Stop reason: HOSPADM

## 2023-01-24 RX ORDER — ONDANSETRON 2 MG/ML
INJECTION INTRAMUSCULAR; INTRAVENOUS PRN
Status: DISCONTINUED | OUTPATIENT
Start: 2023-01-24 | End: 2023-01-24

## 2023-01-24 RX ORDER — ONDANSETRON 2 MG/ML
4 INJECTION INTRAMUSCULAR; INTRAVENOUS EVERY 30 MIN PRN
Status: DISCONTINUED | OUTPATIENT
Start: 2023-01-24 | End: 2023-01-24 | Stop reason: HOSPADM

## 2023-01-24 RX ORDER — PROPOFOL 10 MG/ML
INJECTION, EMULSION INTRAVENOUS PRN
Status: DISCONTINUED | OUTPATIENT
Start: 2023-01-24 | End: 2023-01-24

## 2023-01-24 RX ORDER — ONDANSETRON 4 MG/1
4 TABLET, ORALLY DISINTEGRATING ORAL
Status: CANCELLED | OUTPATIENT
Start: 2023-01-24

## 2023-01-24 RX ORDER — ACETAMINOPHEN 325 MG/1
975 TABLET ORAL ONCE
Status: COMPLETED | OUTPATIENT
Start: 2023-01-24 | End: 2023-01-24

## 2023-01-24 RX ORDER — ACETAMINOPHEN 325 MG/1
975 TABLET ORAL 4 TIMES DAILY
Qty: 168 TABLET | Refills: 0 | Status: SHIPPED | OUTPATIENT
Start: 2023-01-24 | End: 2023-02-07

## 2023-01-24 RX ORDER — METRONIDAZOLE 500 MG/100ML
500 INJECTION, SOLUTION INTRAVENOUS
Status: COMPLETED | OUTPATIENT
Start: 2023-01-24 | End: 2023-01-24

## 2023-01-24 RX ORDER — CEFAZOLIN SODIUM/WATER 2 G/20 ML
2 SYRINGE (ML) INTRAVENOUS
Status: COMPLETED | OUTPATIENT
Start: 2023-01-24 | End: 2023-01-24

## 2023-01-24 RX ORDER — FENTANYL CITRATE 50 UG/ML
50 INJECTION, SOLUTION INTRAMUSCULAR; INTRAVENOUS EVERY 5 MIN PRN
Status: DISCONTINUED | OUTPATIENT
Start: 2023-01-24 | End: 2023-01-24 | Stop reason: HOSPADM

## 2023-01-24 RX ORDER — DEXMEDETOMIDINE HYDROCHLORIDE 4 UG/ML
INJECTION, SOLUTION INTRAVENOUS PRN
Status: DISCONTINUED | OUTPATIENT
Start: 2023-01-24 | End: 2023-01-24

## 2023-01-24 RX ORDER — FENTANYL CITRATE 50 UG/ML
25 INJECTION, SOLUTION INTRAMUSCULAR; INTRAVENOUS EVERY 5 MIN PRN
Status: DISCONTINUED | OUTPATIENT
Start: 2023-01-24 | End: 2023-01-24 | Stop reason: HOSPADM

## 2023-01-24 RX ORDER — BUPIVACAINE HYDROCHLORIDE 2.5 MG/ML
INJECTION, SOLUTION EPIDURAL; INFILTRATION; INTRACAUDAL PRN
Status: DISCONTINUED | OUTPATIENT
Start: 2023-01-24 | End: 2023-01-24 | Stop reason: HOSPADM

## 2023-01-24 RX ORDER — METHOCARBAMOL 500 MG/1
500 TABLET, FILM COATED ORAL 4 TIMES DAILY PRN
Qty: 25 TABLET | Refills: 0 | Status: SHIPPED | OUTPATIENT
Start: 2023-01-24 | End: 2023-02-13

## 2023-01-24 RX ORDER — LIDOCAINE HYDROCHLORIDE 20 MG/ML
INJECTION, SOLUTION INFILTRATION; PERINEURAL PRN
Status: DISCONTINUED | OUTPATIENT
Start: 2023-01-24 | End: 2023-01-24

## 2023-01-24 RX ORDER — ONDANSETRON 4 MG/1
4 TABLET, ORALLY DISINTEGRATING ORAL EVERY 30 MIN PRN
Status: DISCONTINUED | OUTPATIENT
Start: 2023-01-24 | End: 2023-01-24 | Stop reason: HOSPADM

## 2023-01-24 RX ORDER — SODIUM CHLORIDE, SODIUM LACTATE, POTASSIUM CHLORIDE, CALCIUM CHLORIDE 600; 310; 30; 20 MG/100ML; MG/100ML; MG/100ML; MG/100ML
INJECTION, SOLUTION INTRAVENOUS CONTINUOUS PRN
Status: DISCONTINUED | OUTPATIENT
Start: 2023-01-24 | End: 2023-01-24

## 2023-01-24 RX ORDER — POLYETHYLENE GLYCOL 3350 17 G/17G
1 POWDER, FOR SOLUTION ORAL DAILY PRN
Qty: 500 G | Refills: 0 | Status: SHIPPED | OUTPATIENT
Start: 2023-01-24 | End: 2023-02-13

## 2023-01-24 RX ORDER — HYDROMORPHONE HYDROCHLORIDE 1 MG/ML
0.4 INJECTION, SOLUTION INTRAMUSCULAR; INTRAVENOUS; SUBCUTANEOUS EVERY 5 MIN PRN
Status: DISCONTINUED | OUTPATIENT
Start: 2023-01-24 | End: 2023-01-24 | Stop reason: HOSPADM

## 2023-01-24 RX ORDER — IBUPROFEN 800 MG/1
800 TABLET, FILM COATED ORAL 3 TIMES DAILY
Qty: 42 TABLET | Refills: 0 | Status: SHIPPED | OUTPATIENT
Start: 2023-01-24 | End: 2023-02-07

## 2023-01-24 RX ORDER — LIDOCAINE 40 MG/G
CREAM TOPICAL
Status: DISCONTINUED | OUTPATIENT
Start: 2023-01-24 | End: 2023-01-24 | Stop reason: HOSPADM

## 2023-01-24 RX ADMIN — LIDOCAINE HYDROCHLORIDE 100 MG: 20 INJECTION, SOLUTION INFILTRATION; PERINEURAL at 15:44

## 2023-01-24 RX ADMIN — ACETAMINOPHEN 975 MG: 325 TABLET ORAL at 13:46

## 2023-01-24 RX ADMIN — SODIUM CHLORIDE, POTASSIUM CHLORIDE, SODIUM LACTATE AND CALCIUM CHLORIDE: 600; 310; 30; 20 INJECTION, SOLUTION INTRAVENOUS at 15:35

## 2023-01-24 RX ADMIN — DEXMEDETOMIDINE 12 MCG: 100 INJECTION, SOLUTION, CONCENTRATE INTRAVENOUS at 15:51

## 2023-01-24 RX ADMIN — MIDAZOLAM 2 MG: 1 INJECTION INTRAMUSCULAR; INTRAVENOUS at 15:35

## 2023-01-24 RX ADMIN — SUGAMMADEX 200 MG: 100 INJECTION, SOLUTION INTRAVENOUS at 16:39

## 2023-01-24 RX ADMIN — FENTANYL CITRATE 50 MCG: 50 INJECTION, SOLUTION INTRAMUSCULAR; INTRAVENOUS at 15:44

## 2023-01-24 RX ADMIN — Medication 2 G: at 16:12

## 2023-01-24 RX ADMIN — PROPOFOL 100 MCG/KG/MIN: 10 INJECTION, EMULSION INTRAVENOUS at 15:47

## 2023-01-24 RX ADMIN — PHENYLEPHRINE HYDROCHLORIDE 100 MCG: 10 INJECTION INTRAVENOUS at 15:45

## 2023-01-24 RX ADMIN — PROPOFOL 150 MG: 10 INJECTION, EMULSION INTRAVENOUS at 15:45

## 2023-01-24 RX ADMIN — PROPOFOL 50 MG: 10 INJECTION, EMULSION INTRAVENOUS at 15:47

## 2023-01-24 RX ADMIN — FENTANYL CITRATE 50 MCG: 50 INJECTION, SOLUTION INTRAMUSCULAR; INTRAVENOUS at 15:39

## 2023-01-24 RX ADMIN — Medication 50 MG: at 15:46

## 2023-01-24 RX ADMIN — METRONIDAZOLE 500 MG: 500 INJECTION, SOLUTION INTRAVENOUS at 15:45

## 2023-01-24 RX ADMIN — ONDANSETRON 4 MG: 2 INJECTION INTRAMUSCULAR; INTRAVENOUS at 15:47

## 2023-01-24 ASSESSMENT — ENCOUNTER SYMPTOMS: SEIZURES: 1

## 2023-01-24 ASSESSMENT — ACTIVITIES OF DAILY LIVING (ADL)
ADLS_ACUITY_SCORE: 19

## 2023-01-24 NOTE — ANESTHESIA PREPROCEDURE EVALUATION
Anesthesia Pre-Procedure Evaluation    Patient: Shola Owens Jr.   MRN: [id number] : 1995        Procedure : Procedure(s):  EXAM UNDER ANESTHESIA,  POSSIBLE WOUND VAC PLACEMENT  ANUS, LAY OPEN PILONIDAL CYSTECTOMY          Past Medical History:   Diagnosis Date     Acne     improved since .      Autistic disorder, current or active state      Mild persistent asthma      Pilonidal disease      Seasonal allergic rhinitis      Seizure disorder (H)       Past Surgical History:   Procedure Laterality Date     MOUTH SURGERY       [place] teeth removed        Allergies   Allergen Reactions     Risperidone Hives      Social History     Tobacco Use     Smoking status: Never     Smokeless tobacco: Never     Tobacco comments:     outside smokers-not in the house   Substance Use Topics     Alcohol use: No     Alcohol/week: 0.0 standard drinks      Wt Readings from Last 1 Encounters:   23 104.8 kg (231 lb 0.7 oz)        Anesthesia Evaluation            ROS/MED HX  ENT/Pulmonary:     (+) asthma     Neurologic: Comment: Developmental delay  Autism    (+) seizures,     Cardiovascular:  - neg cardiovascular ROS     METS/Exercise Tolerance:     Hematologic:  - neg hematologic  ROS     Musculoskeletal:  - neg musculoskeletal ROS     GI/Hepatic: Comment: Pilonidal cyst      Renal/Genitourinary:  - neg Renal ROS     Endo:  - neg endo ROS     Psychiatric/Substance Use:       Infectious Disease:  - neg infectious disease ROS     Malignancy:  - neg malignancy ROS     Other:            Physical Exam    Airway  airway exam normal      Mallampati: II    Neck ROM: full     Respiratory Devices and Support         Dental       (+) Completely normal teeth      Cardiovascular   cardiovascular exam normal          Pulmonary   pulmonary exam normal                OUTSIDE LABS:  CBC:   Lab Results   Component Value Date    WBC 5.1 01/10/2023    WBC 8.7 2022    HGB 14.1 01/10/2023    HGB 13.5 2022    HCT 42.4 01/10/2023     HCT 41.7 12/12/2022     01/10/2023     12/12/2022     BMP:   Lab Results   Component Value Date     01/10/2023     12/12/2022    POTASSIUM 4.3 01/10/2023    POTASSIUM 4.6 12/12/2022    CHLORIDE 108 (H) 01/10/2023    CHLORIDE 104 12/12/2022    CO2 23 01/10/2023    CO2 24 12/12/2022    BUN 17.9 01/10/2023    BUN 13.7 12/12/2022    CR 1.13 01/10/2023    CR 1.15 12/12/2022    GLC 82 01/10/2023     (H) 12/12/2022     COAGS:   Lab Results   Component Value Date    INR 1.11 01/03/2016     POC:   Lab Results   Component Value Date    BGM 77 04/12/2016     HEPATIC:   Lab Results   Component Value Date    ALBUMIN 4.1 01/10/2023    PROTTOTAL 7.0 01/10/2023    ALT 19 01/10/2023    AST 19 01/10/2023    ALKPHOS 52 01/10/2023    BILITOTAL 0.4 01/10/2023     OTHER:   Lab Results   Component Value Date    LACT 0.9 01/03/2016    A1C 4.8 01/10/2023    ISRA 9.4 01/10/2023    AMYLASE 55 11/28/2018    TSH 3.68 01/10/2023    T4 1.05 01/10/2023    CRP 0.13 06/14/2005       Anesthesia Plan    ASA Status:  3   NPO Status:  NPO Appropriate    Anesthesia Type: General.     - Airway: ETT   Induction: Intravenous.   Maintenance: Balanced.        Consents    Anesthesia Plan(s) and associated risks, benefits, and realistic alternatives discussed. Questions answered and patient/representative(s) expressed understanding.    - Discussed:     - Discussed with:  Patient, Parent (Mother and/or Father)         Postoperative Care    Pain management: Multi-modal analgesia, IV analgesics.   PONV prophylaxis: Ondansetron (or other 5HT-3), Dexamethasone or Solumedrol     Comments:                Jaison Gomez MD  Staff Anesthesiologist  [phone]

## 2023-01-24 NOTE — ANESTHESIA CARE TRANSFER NOTE
Patient: Shola Owens Jr.    Procedure: Procedure(s):  EXAM UNDER ANESTHESIA, WOUND VAC PLACEMENT  ANUS, LAY OPEN PILONIDAL CYSTECTOMY       Diagnosis: Pilonidal disease [L98.8]  Diagnosis Additional Information: No value filed.    Anesthesia Type:   General     Note:    Oropharynx: oropharynx clear of all foreign objects and spontaneously breathing  Level of Consciousness: drowsy  Oxygen Supplementation: face mask  Level of Supplemental Oxygen (L/min / FiO2): 8L  Independent Airway: airway patency satisfactory and stable  Dentition: dentition unchanged  Vital Signs Stable: post-procedure vital signs reviewed and stable  Report to RN Given: handoff report given  Patient transferred to: PACU    Handoff Report: Identifed the Patient, Identified the Reponsible Provider, Reviewed the pertinent medical history, Discussed the surgical course, Reviewed Intra-OP anesthesia mangement and issues during anesthesia, Set expectations for post-procedure period and Allowed opportunity for questions and acknowledgement of understanding      Vitals:  Vitals Value Taken Time   /76 01/24/23 1700   Temp     Pulse 71 01/24/23 1702   Resp 0 01/24/23 1702   SpO2 100 % 01/24/23 1702   Vitals shown include unvalidated device data.    Electronically Signed By: DEYSI Aceves CRNA  January 24, 2023  5:03 PM

## 2023-01-24 NOTE — INTERVAL H&P NOTE
"I have reviewed the surgical (or preoperative) H&P that is linked to this encounter, and examined the patient. There are no significant changes    Clinical Conditions Present on Arrival:  Clinically Significant Risk Factors Present on Admission                    # Obesity: Estimated body mass index is 30.48 kg/m  as calculated from the following:    Height as of this encounter: 1.854 m (6' 1\").    Weight as of this encounter: 104.8 kg (231 lb 0.7 oz).       "

## 2023-01-24 NOTE — OP NOTE
"Procedure Date: 2023.    PREOPERATIVE DIAGNOSES:   1.  Pilonidal disease.  2.  Active autistic disorder.  3.  Epilepsy.    POSTOPERATIVE DIAGNOSES:    1.  Pilonidal disease.  2.  Active autistic disorder.  3.  Epilepsy.    ANESTHESIA:  General endotracheal anesthesia plus local anesthetic.    PROCEDURES PERFORMED:  1.  Pilonidal cystectomy.  2.  Placement of wound VAC.    SURGEON:  Carl Helm MD    ASSISTANTS:  Riley West MD (General Surgery resident); and JC Kumar (In addition to the resident, a midlevel provider was required for additional retraction and bedside assistance).    DESCRIPTION OF OPERATION:  After obtaining informed consent, the patient was brought to the operating room and placed in the supine position.  General endotracheal anesthesia was gently induced without difficulty.  The patient received appropriate preoperative antibiotic prophylaxis as well as mechanical DVT prophylaxis.  He was then turned to the prone jackknife position.  Bilateral lower extremity pneumatic compression devices were applied, and all pressure points were cushioned.  The sacrococcygeal area was prepped and draped in the standard sterile fashion.  A \"timeout\" was performed.  The patient had 3 very large midline pits associated with a large pilonidal cyst that was just right of the midline at the upper portion of the mykel cleft.  There was no evidence of active infection.  En bloc excision of the cyst with the midline pits was performed with a curvilinear incision.  The pits as well as the cyst were quite deep and extended down to the postsacral fascia.  There was no evidence of ongoing infection.  After we removed the cyst and the midline pits I interrogated the adipose tissue to make sure there were no additional side tracts or sinuses.  There was no evidence of fistulization to the anal canal.  The defect measured approximately 18 x 6 x 4 cm.  Hemostasis was corroborated.  The wound was " irrigated with dilute peroxide.  A wound VAC dressing was then placed and there was adequate suction.  This was reinforced near the anus to provide extra seal.  Instrument, sponge, and needle counts were all correct as reported to me.  The patient tolerated the procedure well.    COMPLICATIONS:  None immediately.    ESTIMATED BLOOD LOSS:  10 mL    REPLACEMENT:  300 mL of crystalloid.    DRAINS AND TUBES:  A negative pressure dressing (VAC dressing).    SPECIMENS:  None.    FINDINGS:  Three large midline pits associated with a large pilonidal cyst that was just right of the midline.  All of the lesions were quite deep in nature.  There was no evidence of fistulization towards the anus.  No active infection.    DISPOSITION:  PACU.    Carl Helm MD        D: 2023   T: 2023   MT: TJ    Name:     JULIENNE RODRIGUEZ  MRN:      [id number]        Account:        [id number]   :      1995           Procedure Date: 2023     Document: [id number]    cc:  MONE Merrill, APRN, CNP

## 2023-01-24 NOTE — ANESTHESIA POSTPROCEDURE EVALUATION
Patient: Shola Owens Jr.    Procedure: Procedure(s):  EXAM UNDER ANESTHESIA, WOUND VAC PLACEMENT  ANUS, LAY OPEN PILONIDAL CYSTECTOMY       Anesthesia Type:  General    Note:  Disposition: Admission   Postop Pain Control: Uneventful            Sign Out: Well controlled pain   PONV: No   Neuro/Psych: Uneventful            Sign Out: Acceptable/Baseline neuro status   Airway/Respiratory: Uneventful            Sign Out: Acceptable/Baseline resp. status   CV/Hemodynamics: Uneventful            Sign Out: Acceptable CV status; No obvious hypovolemia; No obvious fluid overload   Other NRE: NONE   DID A NON-ROUTINE EVENT OCCUR? No           Last vitals:  Vitals Value Taken Time   /79 01/24/23 1715   Temp 36  C (96.8  F) 01/24/23 1700   Pulse 74 01/24/23 1723   Resp 4 01/24/23 1723   SpO2 100 % 01/24/23 1723   Vitals shown include unvalidated device data.    Electronically Signed By: Monico Degroot MD  January 24, 2023  5:24 PM

## 2023-01-24 NOTE — BRIEF OP NOTE
[facility]    Brief Operative Note    Pre-operative diagnosis: Pilonidal disease [L98.8]  Post-operative diagnosis Same as pre-operative diagnosis    Procedure: Procedure(s):  EXAM UNDER ANESTHESIA,  POSSIBLE WOUND VAC PLACEMENT  ANUS, LAY OPEN PILONIDAL CYSTECTOMY  Surgeon: Surgeon(s) and Role:     * Carl Helm MD - Primary     * Riley Garrido MD - Resident - Assisting  Anesthesia: MAC   Estimated Blood Loss: 10ml    Drains: None  Specimens: * No specimens in log *  Findings:   Three pits noted in mykel cleft with a 2cm cyst located right and superior from most cephalad pit. Pits as well as pilonidal cyst resected en bloc. Wound vac placed over pilonidal cystectomy site.  Complications: None.  Implants: * No implants in log *

## 2023-01-25 ENCOUNTER — TELEPHONE (OUTPATIENT)
Dept: SURGERY | Facility: CLINIC | Age: 28
End: 2023-01-25
Payer: MEDICAID

## 2023-01-25 ENCOUNTER — HOSPITAL ENCOUNTER (OUTPATIENT)
Dept: WOUND CARE | Facility: CLINIC | Age: 28
Discharge: HOME | End: 2023-01-25
Attending: NURSE PRACTITIONER | Admitting: NURSE PRACTITIONER
Payer: MEDICAID

## 2023-01-25 DIAGNOSIS — L98.8 PILONIDAL DISEASE: Primary | ICD-10-CM

## 2023-01-25 DIAGNOSIS — L98.8 PILONIDAL DISEASE: ICD-10-CM

## 2023-01-25 DIAGNOSIS — Z48.89 ENCOUNTER FOR POSTOPERATIVE WOUND CARE: ICD-10-CM

## 2023-01-25 PROCEDURE — G0463 HOSPITAL OUTPT CLINIC VISIT: HCPCS

## 2023-01-25 NOTE — DISCHARGE INSTRUCTIONS
[facility]  Same-Day Surgery   Adult Discharge Orders & Instructions   For 24 hours after surgery    Get plenty of rest.  A responsible adult must stay with you for at least 24 hours after you leave the hospital.   Do not drive or use heavy equipment.  If you have weakness or tingling, don't drive or use heavy equipment until this feeling goes away.  Do not drink alcohol.  Avoid strenuous or risky activities.  Ask for help when climbing stairs.   You may feel lightheaded.  IF so, sit for a few minutes before standing.  Have someone help you get up.   If you have nausea (feel sick to your stomach): Drink only clear liquids such as apple juice, ginger ale, broth or 7-Up.  Rest may also help.  Be sure to drink enough fluids.  Move to a regular diet as you feel able.  You may have a slight fever. Call the doctor if your fever is over 100 F (37.7 C) (taken under the tongue) or lasts longer than 24 hours.  You may have a dry mouth, a sore throat, muscle aches or trouble sleeping.  These should go away after 24 hours.  Do not make important or legal decisions.   Call your doctor for any of the followin.  Signs of infection (fever, growing tenderness at the surgery site, a large amount of drainage or bleeding, severe pain, foul-smelling drainage, redness, swelling).    2. It has been over 8 to 10 hours since surgery and you are still not able to urinate (pass water).    3.  Headache for over 24 hours.    To contact a doctor, call Dr Helm at the [facility] at [phone]   or:  '   [phone] and ask for the resident on call for general surgery (answered 24 hours a day)  '   Emergency Department:  [facility]: [phone]

## 2023-01-25 NOTE — PROGRESS NOTES
"[facility]    Start of Care in [facility]: 2023   Referring Provider: Dr. Helm  Primary Care Provider: [facility]   Wound Location: Right buttock     Wound Clinic Visit: 1st    Reason for Visit: needing VAC canisters and instruction on how to change them        Subjective: Patient's mom and dad state the Wound VAC has been alarming with different messages including \"blockage alert\" and \"interrupted therapy\". They aren't sure what the issue is.     They came to the clinic to get VAC canisters and to be taught how to take off the old one and apply a new one.     Pt presents with mom and dad.      Wound History:   From a clinic visit 10/18/22 with Rosa Maria Pugh:     HPI:  Has had a pilonidal cyst for a few years that drains intermittently. He has fallen a few times (due to epilepsy and falling on ice) that has caused it to open up. Continuously drains now. No pain. No fevers or chills. No surgeries at the site. Does not smoke. Does not work. No diabetes.    Perianal external examination:  Four large pilonidal pits in the mykel and gluteal cleft with hair present in these. The one closest to the anus with some granulation tissue. Cyst cavity, currently healed over, to the right of the top of the  cleft...     Assessment/Plan: 27 year old male with pilonidal disease. Fairly constant drainage but no significant pain. He has pretty extensive pilonidal disease on exam. Multiple pits in the gluteal and mykel cleft tracking to a cyst cavity to the right of the top of the  cleft. Heavy hair burden. I discussed surgical intervention with his mother. I would be concerned about healing from a flap closure with his autism and epilepsy. However, disease is fairly extensive and would have a large wound after healing, which could be challenging to heal. His mother would provide wound cares. Will discuss with Dr. Helm for an " opinion and follow up with patient's mother. Patient's questions were answered to his stated satisfaction and he is in agreement with this plan.    From Op Report 1/24/23:  FINDINGS:  Three large midline pits associated with a large pilonidal cyst that was just right of the midline.  All of the lesions were quite deep in nature.  There was no evidence of fistulization towards the anus.  No active infection.    Past Medical History:  Patient Active Problem List   Diagnosis     Active autistic disorder     Disturbance in sleep behavior     Seizure     Closed fracture of maxilla (H)     Pilonidal disease                     Tobacco Use:     Tobacco Use      Smoking status: Never      Smokeless tobacco: Never      Tobacco comments: outside smokers-not in the house       Diabetic: no      Personal/social history:  Pt is autistic, lives with his parents    Objective:   Current treatment plan: VAC applied yesterday after surgery  Last changed: applied yesterday    Wound #1 Right Buttock  Assessment deferred until Friday for planned VAC change  Did assess dressing and was noted to be intact without evidence of leakage.  >150mL sanguinous drainage    Mobility: independent    Other callusing/areas of concern: no    Diet: not discussed    Discussed/Education: how to switch out VAC canister and when to switch it out; how to assess dressing for leaks    Assessment:  Pt with VAC applied yesterday post op now with >150mL of sanguinous drainage and leak alarm/blockage alarm sounding    Family without any canisters    Factors impacting wound healing:   Location of wound      Plan:    Full canister was removed and educated family about how to remove/apply. Pt's dad then hooked up the tubing for the new canister and snapped it into place. VAC immediately returned to -125mmHg with a great seal.     Additional recommendations: none    The following discharge instructions were reviewed with and sent home with the patient:  None  today    The following supplies were sent home with the patient:  4 canisters    Return visit: Friday    Verbal, written, & demonstrative education provided.  Face to face time: approximately 15 minutes  Procedure: none    Aleah Mcknight RN, CWOCN  [phone]

## 2023-01-27 ENCOUNTER — HOSPITAL ENCOUNTER (OUTPATIENT)
Dept: WOUND CARE | Facility: CLINIC | Age: 28
Discharge: HOME | End: 2023-01-27
Attending: NURSE PRACTITIONER | Admitting: NURSE PRACTITIONER
Payer: MEDICAID

## 2023-01-27 PROCEDURE — 97605 NEG PRS WND THER DME<=50SQCM: CPT

## 2023-01-27 NOTE — PROGRESS NOTES
[facility]    Start of Care in [facility]: 2023   Referring Provider: Dr. Helm  Primary Care Provider: [facility]   Wound Location: Sacrum     Wound Clinic Visit: 2nd    Reason for Visit: 1st post-op VAC change (surgery date 23)     Subjective: Patient's dad denies issues with the VAC since it was applied on Tuesday. They have changed the canister twice since Wednesday.    Pt presents with dad.      Wound History:   From a clinic visit 10/18/22 with Rosa Maria Pugh:      HPI:  Has had a pilonidal cyst for a few years that drains intermittently. He has fallen a few times (due to epilepsy and falling on ice) that has caused it to open up. Continuously drains now. No pain. No fevers or chills. No surgeries at the site. Does not smoke. Does not work. No diabetes.    Perianal external examination:  Four large pilonidal pits in the mykel and gluteal cleft with hair present in these. The one closest to the anus with some granulation tissue. Cyst cavity, currently healed over, to the right of the top of the  cleft...     Assessment/Plan: 27 year old male with pilonidal disease. Fairly constant drainage but no significant pain. He has pretty extensive pilonidal disease on exam. Multiple pits in the gluteal and mykel cleft tracking to a cyst cavity to the right of the top of the mykel cleft. Heavy hair burden. I discussed surgical intervention with his mother. I would be concerned about healing from a flap closure with his autism and epilepsy. However, disease is fairly extensive and would have a large wound after healing, which could be challenging to heal. His mother would provide wound cares. Will discuss with Dr. Helm for an opinion and follow up with patient's mother. Patient's questions were answered to his stated satisfaction and he is in agreement with this plan.    From Op Report 23:  FINDINGS:  Three large  "midline pits associated with a large pilonidal cyst that was just right of the midline.  All of the lesions were quite deep in nature.  There was no evidence of fistulization towards the anus.  No active infection.    Past Medical History:  Patient Active Problem List   Diagnosis     Active autistic disorder     Disturbance in sleep behavior     Seizure     Closed fracture of maxilla (H)     Pilonidal disease                     Tobacco Use:     Tobacco Use      Smoking status: Never      Smokeless tobacco: Never      Tobacco comments: outside smokers-not in the house       Diabetic: no    Personal/social history:  Pt is autistic, lives with his parents    Objective:   Current treatment plan: VAC   Last changed: Tuesday    Wound #1 Sacrum    Shaved area prior to taking photo  Stage/tissue depth: Full thickness  Length/depth measured with wound held open; width measured with wound at rest  14cm L x 3.5cm (widest) W x 4.7cm D  Tunneling: no  Undermining: no  Wound bed type/amount: see above photo - mix of fat with early granulation and evidence of cautery; non fluctuant  Wound Edges: attached  Periwound: intact  Drainage: copious sanguineous when canister changed on Wednesday; minimal in canister today  Odor: no  Pain: pt did voice \"ouch, ouch, ouch\" a few times during wound care; dad premedicated with oxycodone; overall pt tolerated cares very well considering location of wound    Mobility: independent    Other callusing/areas of concern: no    Diet: not discussed    Discussed/Education: how to remove VAC dressing and perform wet to dry dressing if needed, alarms with VACs and troubleshooting, plan of care with rationale    Assessment:  Surgical wound s/p pilonidal surgery with VAC having good seal for 4 days  Drainage is heavy to copious with family needing to change canister 3 times since surgery  Pt's dad states tubing is getting caught in the waistband of his pants    Factors impacting wound healing:   Location " "of wound and ability to maintain a good seal (as only about 1.5cm between wound edge and anus)  Pt known to \"pick\" r/t autism    Plan:  Continuing NPWT at -125mmHg with bridge to left hip so that Trac PAD isn't on pt's coccyx area. Attempted to use a barrier ring along distal wound edge but wasn't sticking well to skin. Able to get a good seal though with drape alone.    Topical Care: Wound cleansed with Vashe soak for 5-10 minutes. Periwound skin cleansed with Vashe and gauze. Shaved region where drape will be applied. 3M Cavilon No Sting Barrier Film to periwound and allowed to dry. Drape to area of bridge. 1 piece black granufoam to wound bed then bridged to left hip. Pt's dad donned gloves and helped with holding buttocks open to allow for application of drape for optimal seal. VAC on with good suction at visit end at -125mmHg.    Additional recommendations:   Once surgery says it's ok to shower, you can either leave the VAC dressing on or take it off for Shola to shower before coming to his clinic appointments.    Use the drape as needed to secure any loose areas. You can use the \"putty\" at the edge right near the rectum if needed and then apply drape over this.    You don't want the wound VAC to be ON with it not suctioning for >2 hours. Switch to wet to dry dressing instead.    The following discharge instructions were reviewed with and sent home with the patient:  Visit Summary/General Recommendations    Once surgery says it's ok to shower, you can either leave the VAC dressing on or take it off for Shola to shower before coming to his clinic appointments.    Use the drape as needed to secure any loose areas. You can use the \"putty\" at the edge right near the rectum if needed and then apply drape over this.    You don't want the wound VAC to be ON with it not suctioning for >2 hours. If this happens, do the below steps:    Bandage Change  Wash your hands before and after the dressing change. You may wear " gloves if you choose. Gloves are available over the counter at [facility], [facility], [facility], etc.   Use scissors at home that you can designate solely for wound care and cleanse well with each use, (rubbing alcohol or alcohol pads work well for this).    Wash hands.  Remove old dressing.  Wash hands.  1.) Moisten the rolled gauze (cut to the length that you need) with Vashe. Squeeze out any excess (it should be moist but not dripping).  2.) Cover with an ABD pad and secure with tape (apply as little as needed).    Change dressing twice a day (more often if it's draining heavily)    Signs and symptoms of infection:  Bright green drainage  Foul odor  Increasing pain in area  New redness or swelling at the site of the wound or streaks up from wound  New warmth to touch around wound accompanied by redness and swelling  Fever    Need to be seen as soon as possible for infection concerns.    The following supplies were sent home with the patient:  Supplies for several wet to dry dressing changes (bottle of Vashe, rolled gauze, ABD pads, tape)    Return visit: Wednesday (will be seen by colorectal on Monday)    Verbal, written, & demonstrative education provided.  Face to face time: approximately 60 minutes  Procedure: NPWT <50cm2    Aleah Mcknight RN, CWOCN  [phone]

## 2023-01-27 NOTE — DISCHARGE INSTRUCTIONS
"Visit Summary/General Recommendations    Once surgery says it's ok to shower, you can either leave the VAC dressing on or take it off for Shola to shower before coming to his clinic appointments.    Use the drape as needed to secure any loose areas. You can use the \"putty\" at the edge right near the rectum if needed and then apply drape over this.    You don't want the wound VAC to be ON with it not suctioning for >2 hours. If this happens, do the below steps:    Bandage Change  Wash your hands before and after the dressing change. You may wear gloves if you choose. Gloves are available over the counter at [facility], [facility], [facility], etc.   Use scissors at home that you can designate solely for wound care and cleanse well with each use, (rubbing alcohol or alcohol pads work well for this).    Wash hands.  Remove old dressing.  Wash hands.  1.) Moisten the rolled gauze (cut to the length that you need) with Vashe. Squeeze out any excess (it should be moist but not dripping).  2.) Cover with an ABD pad and secure with tape (apply as little as needed).    Change dressing twice a day (more often if it's draining heavily)    Signs and symptoms of infection:  Bright green drainage  Foul odor  Increasing pain in area  New redness or swelling at the site of the wound or streaks up from wound  New warmth to touch around wound accompanied by redness and swelling  Fever    Need to be seen as soon as possible for infection concerns.    Follow up: Monday at [facility] and Wednesday in [place]    Please call with any questions or concerns.    Aleah Mcknight RN, CWOCN  [phone]        "

## 2023-01-28 ENCOUNTER — HOSPITAL ENCOUNTER (EMERGENCY)
Facility: CLINIC | Age: 28
Discharge: HOME | End: 2023-01-28
Attending: FAMILY MEDICINE | Admitting: FAMILY MEDICINE
Payer: MEDICAID

## 2023-01-28 ENCOUNTER — APPOINTMENT (OUTPATIENT)
Dept: GENERAL RADIOLOGY | Facility: CLINIC | Age: 28
End: 2023-01-28
Attending: FAMILY MEDICINE
Payer: MEDICAID

## 2023-01-28 ENCOUNTER — APPOINTMENT (OUTPATIENT)
Dept: CT IMAGING | Facility: CLINIC | Age: 28
End: 2023-01-28
Attending: FAMILY MEDICINE
Payer: MEDICAID

## 2023-01-28 VITALS
BODY MASS INDEX: 30.48 KG/M2 | OXYGEN SATURATION: 97 % | DIASTOLIC BLOOD PRESSURE: 75 MMHG | HEART RATE: 104 BPM | RESPIRATION RATE: 20 BRPM | WEIGHT: 231 LBS | TEMPERATURE: 100.3 F | SYSTOLIC BLOOD PRESSURE: 120 MMHG

## 2023-01-28 DIAGNOSIS — L08.9 LOCAL INFECTION OF WOUND: ICD-10-CM

## 2023-01-28 DIAGNOSIS — T14.8XXA LOCAL INFECTION OF WOUND: ICD-10-CM

## 2023-01-28 LAB
ALBUMIN SERPL BCG-MCNC: 3.3 G/DL (ref 3.5–5.2)
ALBUMIN UR-MCNC: 30 MG/DL
ALP SERPL-CCNC: 46 U/L (ref 40–129)
ALT SERPL W P-5'-P-CCNC: 12 U/L (ref 10–50)
ANION GAP SERPL CALCULATED.3IONS-SCNC: 11 MMOL/L (ref 7–15)
APPEARANCE UR: CLEAR
AST SERPL W P-5'-P-CCNC: 10 U/L (ref 10–50)
BASOPHILS # BLD AUTO: 0 10E3/UL (ref 0–0.2)
BASOPHILS NFR BLD AUTO: 0 %
BILIRUB SERPL-MCNC: 0.3 MG/DL
BILIRUB UR QL STRIP: NEGATIVE
BUN SERPL-MCNC: 13 MG/DL (ref 6–20)
CALCIUM SERPL-MCNC: 8.7 MG/DL (ref 8.6–10)
CHLORIDE SERPL-SCNC: 106 MMOL/L (ref 98–107)
COLOR UR AUTO: YELLOW
CREAT SERPL-MCNC: 1.08 MG/DL (ref 0.67–1.17)
CRP SERPL-MCNC: 37.62 MG/L
DEPRECATED HCO3 PLAS-SCNC: 20 MMOL/L (ref 22–29)
EOSINOPHIL # BLD AUTO: 0.3 10E3/UL (ref 0–0.7)
EOSINOPHIL NFR BLD AUTO: 3 %
ERYTHROCYTE [DISTWIDTH] IN BLOOD BY AUTOMATED COUNT: 14.3 % (ref 10–15)
FLUAV RNA SPEC QL NAA+PROBE: NEGATIVE
FLUBV RNA RESP QL NAA+PROBE: NEGATIVE
GFR SERPL CREATININE-BSD FRML MDRD: >90 ML/MIN/1.73M2
GLUCOSE SERPL-MCNC: 103 MG/DL (ref 70–99)
GLUCOSE UR STRIP-MCNC: NEGATIVE MG/DL
HCT VFR BLD AUTO: 38.7 % (ref 40–53)
HGB BLD-MCNC: 12.8 G/DL (ref 13.3–17.7)
HGB UR QL STRIP: NEGATIVE
HYALINE CASTS: 1 /LPF
IMM GRANULOCYTES # BLD: 0.1 10E3/UL
IMM GRANULOCYTES NFR BLD: 1 %
KETONES UR STRIP-MCNC: 5 MG/DL
LEUKOCYTE ESTERASE UR QL STRIP: NEGATIVE
LYMPHOCYTES # BLD AUTO: 0.7 10E3/UL (ref 0.8–5.3)
LYMPHOCYTES NFR BLD AUTO: 8 %
MCH RBC QN AUTO: 31.7 PG (ref 26.5–33)
MCHC RBC AUTO-ENTMCNC: 33.1 G/DL (ref 31.5–36.5)
MCV RBC AUTO: 96 FL (ref 78–100)
MONOCYTES # BLD AUTO: 1.5 10E3/UL (ref 0–1.3)
MONOCYTES NFR BLD AUTO: 17 %
MUCOUS THREADS #/AREA URNS LPF: PRESENT /LPF
NEUTROPHILS # BLD AUTO: 6.4 10E3/UL (ref 1.6–8.3)
NEUTROPHILS NFR BLD AUTO: 71 %
NITRATE UR QL: NEGATIVE
NRBC # BLD AUTO: 0 10E3/UL
NRBC BLD AUTO-RTO: 0 /100
PH UR STRIP: 7.5 [PH] (ref 5–7)
PLATELET # BLD AUTO: 140 10E3/UL (ref 150–450)
POTASSIUM SERPL-SCNC: 4.2 MMOL/L (ref 3.4–5.3)
PROT SERPL-MCNC: 6 G/DL (ref 6.4–8.3)
RBC # BLD AUTO: 4.04 10E6/UL (ref 4.4–5.9)
RBC URINE: 1 /HPF
RSV RNA SPEC NAA+PROBE: NEGATIVE
SARS-COV-2 RNA RESP QL NAA+PROBE: NEGATIVE
SODIUM SERPL-SCNC: 137 MMOL/L (ref 136–145)
SP GR UR STRIP: 1.02 (ref 1–1.03)
SQUAMOUS EPITHELIAL: 1 /HPF
UROBILINOGEN UR STRIP-MCNC: NORMAL MG/DL
WBC # BLD AUTO: 8.9 10E3/UL (ref 4–11)
WBC URINE: 1 /HPF

## 2023-01-28 PROCEDURE — 250N000009 HC RX 250: Performed by: FAMILY MEDICINE

## 2023-01-28 PROCEDURE — 71045 X-RAY EXAM CHEST 1 VIEW: CPT

## 2023-01-28 PROCEDURE — 72193 CT PELVIS W/DYE: CPT

## 2023-01-28 PROCEDURE — 36415 COLL VENOUS BLD VENIPUNCTURE: CPT | Performed by: FAMILY MEDICINE

## 2023-01-28 PROCEDURE — 87040 BLOOD CULTURE FOR BACTERIA: CPT | Performed by: FAMILY MEDICINE

## 2023-01-28 PROCEDURE — 86140 C-REACTIVE PROTEIN: CPT | Performed by: FAMILY MEDICINE

## 2023-01-28 PROCEDURE — 81001 URINALYSIS AUTO W/SCOPE: CPT | Performed by: FAMILY MEDICINE

## 2023-01-28 PROCEDURE — 99285 EMERGENCY DEPT VISIT HI MDM: CPT | Mod: 25,CS | Performed by: FAMILY MEDICINE

## 2023-01-28 PROCEDURE — 80053 COMPREHEN METABOLIC PANEL: CPT | Performed by: FAMILY MEDICINE

## 2023-01-28 PROCEDURE — C9803 HOPD COVID-19 SPEC COLLECT: HCPCS | Performed by: FAMILY MEDICINE

## 2023-01-28 PROCEDURE — 250N000011 HC RX IP 250 OP 636: Performed by: FAMILY MEDICINE

## 2023-01-28 PROCEDURE — 99284 EMERGENCY DEPT VISIT MOD MDM: CPT | Mod: CS | Performed by: FAMILY MEDICINE

## 2023-01-28 PROCEDURE — 85025 COMPLETE CBC W/AUTO DIFF WBC: CPT | Performed by: FAMILY MEDICINE

## 2023-01-28 PROCEDURE — 87637 SARSCOV2&INF A&B&RSV AMP PRB: CPT | Performed by: FAMILY MEDICINE

## 2023-01-28 RX ORDER — IOPAMIDOL 755 MG/ML
100 INJECTION, SOLUTION INTRAVASCULAR ONCE
Status: COMPLETED | OUTPATIENT
Start: 2023-01-28 | End: 2023-01-28

## 2023-01-28 RX ORDER — CEPHALEXIN 500 MG/1
500 CAPSULE ORAL 4 TIMES DAILY
Qty: 40 CAPSULE | Refills: 0 | Status: SHIPPED | OUTPATIENT
Start: 2023-01-28 | End: 2023-02-07

## 2023-01-28 RX ADMIN — SODIUM CHLORIDE 68 ML: 9 INJECTION, SOLUTION INTRAVENOUS at 10:52

## 2023-01-28 RX ADMIN — IOPAMIDOL 100 ML: 755 INJECTION, SOLUTION INTRAVENOUS at 10:52

## 2023-01-28 ASSESSMENT — ACTIVITIES OF DAILY LIVING (ADL)
ADLS_ACUITY_SCORE: 35
ADLS_ACUITY_SCORE: 35

## 2023-01-28 NOTE — ED TRIAGE NOTES
Pilonidal cyst surgery Tuesday.  Today fever.  Has wound vac  Ibuprofen at 0700 800mg       Triage Assessment     Row Name 01/28/23 0744       Triage Assessment (Adult)    Airway WDL WDL       Respiratory WDL    Respiratory WDL WDL       Skin Circulation/Temperature WDL    Skin Circulation/Temperature WDL WDL       Cardiac WDL    Cardiac WDL WDL       Cognitive/Neuro/Behavioral WDL    Cognitive/Neuro/Behavioral WDL all

## 2023-01-28 NOTE — DISCHARGE INSTRUCTIONS
Keflex 500 mg p.o. 4 times daily x10 days.  Tylenol/ibuprofen as needed for fever and comfort.  Return to the emergency department if worse or changes.  Follow-up with colorectal as previously directed

## 2023-01-28 NOTE — ED PROVIDER NOTES
History     Chief Complaint   Patient presents with     Fever     Post Op Complications     HPI  Shola Owens Jr. is a 27 year old male, past medical history is significant for pilonidal disease, seizure, active autistic disorder, presents to the emergency department with concerns of fever and postop complications from pilonidal cystectomy and wound VAC placement on 1/24/2023 with Dr. Carl Helm at [facility].  History is obtained from the patient's caregivers with whom he presents as he is essentially nonverbal.  They state that he had been doing well postoperatively from the above referenced surgery with note excerpt copied below, wound check yesterday appeared good and I reviewed a picture of it in the chart, this morning awoke with fever over 100.4, complaining of pain in the perianal area apparently.  No nausea or vomiting.  He seems to be eating and drinking normally.  He has a cough but he always has a cough.  Some cloudiness appearance to his urine but that may not be unusual either.  He is not prone to urinary tract infections.  I reviewed the operative note from his surgery on 1/24/2023 an excerpt of which appears here:    SURGEON:  Carl Helm MD     ASSISTANTS:  Riley West MD (General Surgery resident); and JC Kuamr (In addition to the resident, a midlevel provider was required for additional retraction and bedside assistance).     DESCRIPTION OF OPERATION:  After obtaining informed consent, the patient was brought to the operating room and placed in the supine position.  General endotracheal anesthesia was gently induced without difficulty.  The patient received appropriate preoperative antibiotic prophylaxis as well as mechanical DVT prophylaxis.  He was then turned to the prone jackknife position.  Bilateral lower extremity pneumatic compression devices were applied, and all pressure points were cushioned.  The sacrococcygeal area was prepped  "and draped in the standard sterile fashion.  A \"timeout\" was performed.  The patient had 3 very large midline pits associated with a large pilonidal cyst that was just right of the midline at the upper portion of the mykel cleft.  There was no evidence of active infection.  En bloc excision of the cyst with the midline pits was performed with a curvilinear incision.  The pits as well as the cyst were quite deep and extended down to the postsacral fascia.  There was no evidence of ongoing infection.  After we removed the cyst and the midline pits I interrogated the adipose tissue to make sure there were no additional side tracts or sinuses.  There was no evidence of fistulization to the anal canal.  The defect measured approximately 18 x 6 x 4 cm.  Hemostasis was corroborated.  The wound was irrigated with dilute peroxide.  A wound VAC dressing was then placed and there was adequate suction.  This was reinforced near the anus to provide extra seal.  Instrument, sponge, and needle counts were all correct as reported to me.  The patient tolerated the procedure well.      Allergies:  Allergies   Allergen Reactions     Risperidone Hives       Problem List:    Patient Active Problem List    Diagnosis Date Noted     Pilonidal disease 10/25/2022     Priority: Medium     Added automatically from request for surgery [id number]       Closed fracture of maxilla (H) 2016     Priority: Medium     Seizure 2015     Priority: Medium     Disturbance in sleep behavior 2006     Priority: Medium     Doing well on Geodon managed by Dr. Sanders.  2013:  Geodon 120 mg BID.  Problem list name updated by automated process. Provider to review       Active autistic disorder 2005     Priority: Medium     Moderate to severe.  On Tenex and Cymbalta for anxiety.  Meds managed by Dr. Sanders.  [facility]  2013: Tenex 1 mg BID.  Started Zyprexa PRN for some increased aggression.  Reflux symptoms " have been episodic since 11/2011. Associated with the size of the meals. Portion control is effective.   Problem list name updated by automated process. Provider to review          Past Medical History:    Past Medical History:   Diagnosis Date     Acne      Autistic disorder, current or active state      Mild persistent asthma      Pilonidal disease      Seasonal allergic rhinitis      Seizure disorder (H)        Past Surgical History:    Past Surgical History:   Procedure Laterality Date     CYSTECTOMY PILONIDAL N/A 1/24/2023    Procedure: ANUS, LAY OPEN PILONIDAL CYSTECTOMY;  Surgeon: Carl Helm MD;  Location: [facility] OR     EXAM UNDER ANESTHESIA ANUS N/A 1/24/2023    Procedure: EXAM UNDER ANESTHESIA, WOUND VAC PLACEMENT;  Surgeon: Carl Helm MD;  Location: [facility] OR     MOUTH SURGERY       [place] teeth removed         Family History:    Family History   Problem Relation Age of Onset     Allergies Mother         seasonal     Hypertension Father      Post Operative Nausea and Vomiting Sister      Heart Disease Maternal Grandmother         A. Fib     Thyroid Disease Maternal Grandmother         hypo     Post Operative Nausea and Vomiting Maternal Grandmother      Cancer Maternal Grandfather         cancerous tumor in his arm     Hypertension Paternal Grandmother      Thyroid Disease Paternal Grandmother         hypo     Cancer Paternal Grandfather         passed from lung cancer     Hypertension Maternal Uncle      Post Operative Nausea and Vomiting Other         maternal great uncle     Clotting Disorder No family hx of        Social History:  Marital Status:  Single [1]  Social History     Tobacco Use     Smoking status: Never     Smokeless tobacco: Never     Tobacco comments:     outside smokers-not in the house   Vaping Use     Vaping Use: Never used   Substance Use Topics     Alcohol use: No     Alcohol/week: 0.0 standard drinks     Drug use: No        Medications:    cephALEXin (KEFLEX)  500 MG capsule  acetaminophen (TYLENOL) 325 MG tablet  cetirizine (ZYRTEC) 10 MG tablet  divalproex sodium extended-release (DEPAKOTE ER) 500 MG 24 hr tablet  guanFACINE (TENEX) 1 MG tablet  ibuprofen (ADVIL/MOTRIN) 800 MG tablet  lamoTRIgine (LAMICTAL) 100 MG tablet  methocarbamol (ROBAXIN) 500 MG tablet  montelukast (SINGULAIR) 10 MG tablet  Multiple Vitamin (MULTIVITAMINS PO)  OLANZapine (ZYPREXA) 5 MG tablet  oxyCODONE (ROXICODONE) 5 MG tablet  polyethylene glycol (MIRALAX) 17 GM/Dose powder  ziprasidone (GEODON) 40 MG capsule  ziprasidone (GEODON) 80 MG capsule  zonisamide (ZONEGRAN) 100 MG capsule          Review of Systems   All other systems reviewed and are negative.      Physical Exam   BP: 120/75  Pulse: 104  Temp: 100.3  F (37.9  C)  Resp: 20  Weight: 104.8 kg (231 lb)  SpO2: 97 %      Physical Exam  Vitals and nursing note reviewed.   Constitutional:       Appearance: He is ill-appearing.      Comments: Alert, prone position when I enter the room   Musculoskeletal:      Comments: With the patient in the prone position and with the assistance of caregivers I visualize the low back gluteal area.  The dressing is intact, there is no surrounding erythema however mild induration at the wound margins there is no purulent drainage.  The wound VAC appears to be functioning normally.   Skin:     General: Skin is warm and dry.      Capillary Refill: Capillary refill takes less than 2 seconds.   Neurological:      Mental Status: He is alert.         ED Course                 Procedures              Critical Care time:  none               Results for orders placed or performed during the hospital encounter of 01/28/23 (from the past 24 hour(s))   CBC with platelets, differential    Narrative    The following orders were created for panel order CBC with platelets, differential.  Procedure                               Abnormality         Status                     ---------                               -----------          ------                     CBC with platelets and d...[[id number]]  Abnormal            Final result                 Please view results for these tests on the individual orders.   Comprehensive metabolic panel   Result Value Ref Range    Sodium 137 136 - 145 mmol/L    Potassium 4.2 3.4 - 5.3 mmol/L    Chloride 106 98 - 107 mmol/L    Carbon Dioxide (CO2) 20 (L) 22 - 29 mmol/L    Anion Gap 11 7 - 15 mmol/L    Urea Nitrogen 13.0 6.0 - 20.0 mg/dL    Creatinine 1.08 0.67 - 1.17 mg/dL    Calcium 8.7 8.6 - 10.0 mg/dL    Glucose 103 (H) 70 - 99 mg/dL    Alkaline Phosphatase 46 40 - 129 U/L    AST 10 10 - 50 U/L    ALT 12 10 - 50 U/L    Protein Total 6.0 (L) 6.4 - 8.3 g/dL    Albumin 3.3 (L) 3.5 - 5.2 g/dL    Bilirubin Total 0.3 <=1.2 mg/dL    GFR Estimate >90 >60 mL/min/1.73m2   CRP Inflammation   Result Value Ref Range    CRP Inflammation 37.62 (H) <5.00 mg/L   CBC with platelets and differential   Result Value Ref Range    WBC Count 8.9 4.0 - 11.0 10e3/uL    RBC Count 4.04 (L) 4.40 - 5.90 10e6/uL    Hemoglobin 12.8 (L) 13.3 - 17.7 g/dL    Hematocrit 38.7 (L) 40.0 - 53.0 %    MCV 96 78 - 100 fL    MCH 31.7 26.5 - 33.0 pg    MCHC 33.1 31.5 - 36.5 g/dL    RDW 14.3 10.0 - 15.0 %    Platelet Count 140 (L) 150 - 450 10e3/uL    % Neutrophils 71 %    % Lymphocytes 8 %    % Monocytes 17 %    % Eosinophils 3 %    % Basophils 0 %    % Immature Granulocytes 1 %    NRBCs per 100 WBC 0 <1 /100    Absolute Neutrophils 6.4 1.6 - 8.3 10e3/uL    Absolute Lymphocytes 0.7 (L) 0.8 - 5.3 10e3/uL    Absolute Monocytes 1.5 (H) 0.0 - 1.3 10e3/uL    Absolute Eosinophils 0.3 0.0 - 0.7 10e3/uL    Absolute Basophils 0.0 0.0 - 0.2 10e3/uL    Absolute Immature Granulocytes 0.1 <=0.4 10e3/uL    Absolute NRBCs 0.0 10e3/uL   Symptomatic Influenza A/B & SARS-CoV2 (COVID-19) Virus PCR Multiplex Nasopharyngeal    Specimen: Nasopharyngeal; Swab   Result Value Ref Range    Influenza A PCR Negative Negative    Influenza B PCR Negative Negative    RSV PCR  Negative Negative    SARS CoV2 PCR Negative Negative    Narrative    Testing was performed using the Xpert Xpress CoV2/Flu/RSV Assay on the [employer] Instrument. This test should be ordered for the detection of SARS-CoV-2 and influenza viruses in individuals who meet clinical and/or epidemiological criteria. Test performance is unknown in asymptomatic patients. This test is for in vitro diagnostic use under the FDA EUA for laboratories certified under CLIA to perform high or moderate complexity testing. This test has not been FDA cleared or approved. A negative result does not rule out the presence of PCR inhibitors in the specimen or target RNA in concentration below the limit of detection for the assay. If only one viral target is positive but coinfection with multiple targets is suspected, the sample should be re-tested with another FDA cleared, approved, or authorized test, if coinfection would change clinical management. This test was validated by the [facility]. These laboratories are certified under the Clinical Laboratory Improvement Amendments of 1988 (CLIA-88) as qualified to perform high complexity laboratory testing.   XR Chest Port 1 View    Narrative    EXAM: XR CHEST PORTABLE 1 VIEW  LOCATION: [facility]  DATE/TIME: 01/28/2023, 9:50 AM    INDICATION: Cough, fever, postop.  COMPARISON: None.      Impression    IMPRESSION: Negative chest.         UA reflex to Microscopic and Culture    Specimen: Urine, NOS   Result Value Ref Range    Color Urine Yellow Colorless, Straw, Light Yellow, Yellow    Appearance Urine Clear Clear    Glucose Urine Negative Negative mg/dL    Bilirubin Urine Negative Negative    Ketones Urine 5 (A) Negative mg/dL    Specific Gravity Urine 1.020 1.003 - 1.035    Blood Urine Negative Negative    pH Urine 7.5 (H) 5.0 - 7.0    Protein Albumin Urine 30 (A) Negative mg/dL    Urobilinogen Urine Normal Normal, 2.0 mg/dL    Nitrite  Urine Negative Negative    Leukocyte Esterase Urine Negative Negative    Mucus Urine Present (A) None Seen /LPF    RBC Urine 1 <=2 /HPF    WBC Urine 1 <=5 /HPF    Squamous Epithelials Urine 1 <=1 /HPF    Hyaline Casts Urine 1 <=2 /LPF    Narrative    Urine Culture not indicated   CT Pelvis Soft Tissue w Contrast    Narrative    EXAM: CT PELVIS SOFT TISSUE W CONTRAST  LOCATION: [facility]  DATE/TIME: 1/28/2023 11:03 AM    INDICATION: Status post postop day 4 from pilonidal cyst excision with wound VAC, fever, evaluate for abscess infection at operative site  COMPARISON: None.  TECHNIQUE: CT scan of the pelvis was performed with IV contrast. Multiplanar reformats were obtained. Dose reduction techniques were used.  CONTRAST: 100 mL Isovue 370    FINDINGS:    PELVIC ORGANS: Trace pelvic fluid noted on the right series 2 image 61. No acute bowel abnormality at the pelvis. Normal appendix. No acute vascular abnormality. Prostate appears normal measuring 3.4 cm. Bladder is minimally distended but otherwise   unremarkable.    MUSCULOSKELETAL: Postoperative change with a skin defect at the posterior low back consistent with the provided history of pilonidal cyst excision. There is some mild subcutaneous edema in this region but no drainable abscess can be seen.      Impression    IMPRESSION:  1.  No abscess can be currently seen at the site of pilonidal cyst excision. There is a skin defect and some subcutaneous edema in this area.  2.  Trace free pelvic fluid.         Medications   iopamidol (ISOVUE-370) solution 100 mL (100 mLs Intravenous Given 1/28/23 1052)   sodium chloride 0.9 % bag 500mL for CT scan flush use (68 mLs As instructed Given 1/28/23 1052)     12:05 PM  Reviewed diagnostic evaluation with 2 caregivers in the room with the patient.  Mildly elevated CRP, normal white cell count, patient appears well currently clinically.  Imaging is reassuring for lack of abscess formation at the  operative site.  Alternate explanation such as pneumonia related to general anesthesia and endotracheal intubation was considered with a negative chest x-ray currently and other than fever stable adult range vital signs.  Urine is clear.  Could be mild wound infection, consideration given to the possibility of a mild viral infection, doubt CNS infection given history and context.  Discussed the option of observing the patient at home treating with Tylenol and ibuprofen versus empirically starting antibiotic for possible early wound infection.  Caregivers would like to start antibiotic and will place the patient on oral Keflex 5 mg p.o. 4 times daily x10 days.  He will continue with Tylenol/ibuprofen.  Follow-up will be with colorectal as planned, wound care, return to the emergency department if worse or changes.      Assessments & Plan (with Medical Decision Making)   Assessments and plan with medical decision making at the time step above    Disclaimer: This note consists of symbols derived from keyboarding, dictation and/or voice recognition software. As a result, there may be errors in the script that have gone undetected. Please consider this when interpreting information found in this chart.      I have reviewed the nursing notes.    I have reviewed the findings, diagnosis, plan and need for follow up with the patient.             New Prescriptions    CEPHALEXIN (KEFLEX) 500 MG CAPSULE    Take 1 capsule (500 mg) by mouth 4 times daily for 10 days       Final diagnoses:   Local infection of wound       1/28/2023   [facility] EMERGENCY DEPT     Olvin Bertrand MD  01/28/23 [id number]

## 2023-01-29 ENCOUNTER — HOSPITAL ENCOUNTER (EMERGENCY)
Facility: CLINIC | Age: 28
Discharge: HOME | End: 2023-01-29
Attending: EMERGENCY MEDICINE | Admitting: EMERGENCY MEDICINE
Payer: MEDICAID

## 2023-01-29 VITALS
HEIGHT: 72 IN | BODY MASS INDEX: 31.29 KG/M2 | OXYGEN SATURATION: 98 % | RESPIRATION RATE: 14 BRPM | DIASTOLIC BLOOD PRESSURE: 70 MMHG | SYSTOLIC BLOOD PRESSURE: 128 MMHG | WEIGHT: 231 LBS | HEART RATE: 80 BPM | TEMPERATURE: 99.4 F

## 2023-01-29 DIAGNOSIS — L98.8 PILONIDAL DISEASE: ICD-10-CM

## 2023-01-29 DIAGNOSIS — R60.0 PERIORBITAL EDEMA OF BOTH EYES: ICD-10-CM

## 2023-01-29 PROCEDURE — 99282 EMERGENCY DEPT VISIT SF MDM: CPT | Performed by: EMERGENCY MEDICINE

## 2023-01-29 PROCEDURE — 99283 EMERGENCY DEPT VISIT LOW MDM: CPT | Performed by: EMERGENCY MEDICINE

## 2023-01-29 ASSESSMENT — ACTIVITIES OF DAILY LIVING (ADL): ADLS_ACUITY_SCORE: 35

## 2023-01-29 NOTE — DISCHARGE INSTRUCTIONS
Shola's wound vac can be replaced in clinic tomorrow. I am sorry that we don't have the staff who works with wound vac available for you today. Wet to dry dressing as instructed twice daily.     It is okay to discontinue Keflex if you are concerned about an allergic reaction. If he develops swelling of his tongue or lips, seems to have increased effort in breathing, wheezing, hives, vomiting or diarrhea, these would be signs of an allergic reaction and should be reevaluated immediately.

## 2023-01-29 NOTE — ED TRIAGE NOTES
Pt here with facial swelling. Pt was sen yesterday for fever. Pt has recent cyst removal surgery. Pt also pulled his wound vac off last night.   This RN cared for this patient yesterday. Pt appears to have no discernable difference in his facial features and appearance as compared to yesterday.      Triage Assessment       Row Name 01/29/23 0838       Triage Assessment (Adult)    Airway WDL WDL       Respiratory WDL    Respiratory WDL WDL       Skin Circulation/Temperature WDL    Skin Circulation/Temperature WDL WDL       Cardiac WDL    Cardiac WDL WDL       Peripheral/Neurovascular WDL    Peripheral Neurovascular WDL WDL       Cognitive/Neuro/Behavioral WDL    Cognitive/Neuro/Behavioral WDL WDL

## 2023-01-29 NOTE — ED PROVIDER NOTES
"  History     Chief Complaint   Patient presents with     Facial Swelling     HPI  Shola Owens Jr. is a 27 year old male with history notable for Autism, recent excision of pilonidal cyst (1/24/2023) who was brought in by mom and dad with concern for swelling around his eyes and he pulled off his wound vac last evening. Of note, he was evaluated in this department yesterday for low grade fever and that note was reviewed. Reassuring work-up. Dad is packing wound twice daily as was instructed by wound clinic. Dad says he is a \"\" and will likely have a hard time with the wound vac. He has swelling of both of his eyes which mom finds concerning. No erythema, redness or eye, swelling of face, lips or tongue. No changes in breathing. No vomiting or diarrhea. No rash    The patient's PMHx, Surgical Hx, Allergies, and Medications were all reviewed with the patient's mother and father.    Allergies:  Allergies   Allergen Reactions     Risperidone Hives       Problem List:    Patient Active Problem List    Diagnosis Date Noted     Pilonidal disease 10/25/2022     Priority: Medium     Added automatically from request for surgery [id number]       Closed fracture of maxilla (H) 04/18/2016     Priority: Medium     Seizure 04/30/2015     Priority: Medium     Disturbance in sleep behavior 08/31/2006     Priority: Medium     Doing well on Geodon managed by Dr. Sanders.  05/2013:  Geodon 120 mg BID.  Problem list name updated by automated process. Provider to review       Active autistic disorder 06/13/2005     Priority: Medium     Moderate to severe.  On Tenex and Cymbalta for anxiety.  Meds [facility] Dr. Sanders[facility]  05/2013: Tenex 1 mg BID.  Started Zyprexa PRN for some increased aggression.  Reflux symptoms have been episodic since 11/2011. Associated with the size of the meals. Portion control is effective.   Problem list name updated by automated process. Provider to review      "     Past Medical History:    Past Medical History:   Diagnosis Date     Acne      Autistic disorder, current or active state      Mild persistent asthma      Pilonidal disease      Seasonal allergic rhinitis      Seizure disorder (H)        Past Surgical History:    Past Surgical History:   Procedure Laterality Date     CYSTECTOMY PILONIDAL N/A 1/24/2023    Procedure: ANUS, LAY OPEN PILONIDAL CYSTECTOMY;  Surgeon: Carl Helm MD;  Location: [facility] OR     EXAM UNDER ANESTHESIA ANUS N/A 1/24/2023    Procedure: EXAM UNDER ANESTHESIA, WOUND VAC PLACEMENT;  Surgeon: Carl Helm MD;  Location: [facility] OR     MOUTH SURGERY       [place] teeth removed         Family History:    Family History   Problem Relation Age of Onset     Allergies Mother         seasonal     Hypertension Father      Post Operative Nausea and Vomiting Sister      Heart Disease Maternal Grandmother         A. Fib     Thyroid Disease Maternal Grandmother         hypo     Post Operative Nausea and Vomiting Maternal Grandmother      Cancer Maternal Grandfather         cancerous tumor in his arm     Hypertension Paternal Grandmother      Thyroid Disease Paternal Grandmother         hypo     Cancer Paternal Grandfather         passed from lung cancer     Hypertension Maternal Uncle      Post Operative Nausea and Vomiting Other         maternal great uncle     Clotting Disorder No family hx of        Social History:  Marital Status:  Single [1]  Social History     Tobacco Use     Smoking status: Never     Smokeless tobacco: Never     Tobacco comments:     outside smokers-not in the house   Vaping Use     Vaping Use: Never used   Substance Use Topics     Alcohol use: No     Alcohol/week: 0.0 standard drinks     Drug use: No        Medications:    acetaminophen (TYLENOL) 325 MG tablet  cephALEXin (KEFLEX) 500 MG capsule  cetirizine (ZYRTEC) 10 MG tablet  divalproex sodium extended-release (DEPAKOTE ER) 500 MG 24 hr tablet  guanFACINE (TENEX)  1 MG tablet  ibuprofen (ADVIL/MOTRIN) 800 MG tablet  lamoTRIgine (LAMICTAL) 100 MG tablet  methocarbamol (ROBAXIN) 500 MG tablet  montelukast (SINGULAIR) 10 MG tablet  Multiple Vitamin (MULTIVITAMINS PO)  OLANZapine (ZYPREXA) 5 MG tablet  oxyCODONE (ROXICODONE) 5 MG tablet  polyethylene glycol (MIRALAX) 17 GM/Dose powder  ziprasidone (GEODON) 40 MG capsule  ziprasidone (GEODON) 80 MG capsule  zonisamide (ZONEGRAN) 100 MG capsule          Review of Systems  Unable to obtain 2/2 cognitive disorder.     Physical Exam   BP: 136/72  Pulse: 86  Temp: 99.4  F (37.4  C)  Resp: 16  Height: 182.9 cm (6')  Weight: 104.8 kg (231 lb)  SpO2: 98 %    Physical Exam  GEN: Awake, and alert.   HENT: MMM. External ears and nose normal bilaterally. No lingual edema. No edema lips  EYES: EOM intact. Slight edema of lower lids bilaterally. No edema of upper lids. No erythema.   NECK: Symmetric, freely mobile.   CV : Regular rate and rhythm.  PULM: Normal effort. No wheezes, rales, or rhonchi bilaterally.  BACK: wound packing in place. No tenderness or erythema surrounding wound.   EXT: No gross deformity. Warm and well perfused.  INT: Warm. No diaphoresis. Normal color.        ED Course        Procedures       Critical Care time:  none               Results for orders placed or performed during the hospital encounter of 01/28/23 (from the past 24 hour(s))   CT Pelvis Soft Tissue w Contrast    Narrative    EXAM: CT PELVIS SOFT TISSUE W CONTRAST  LOCATION: [facility]  DATE/TIME: 1/28/2023 11:03 AM    INDICATION: Status post postop day 4 from pilonidal cyst excision with wound VAC, fever, evaluate for abscess infection at operative site  COMPARISON: None.  TECHNIQUE: CT scan of the pelvis was performed with IV contrast. Multiplanar reformats were obtained. Dose reduction techniques were used.  CONTRAST: 100 mL Isovue 370    FINDINGS:    PELVIC ORGANS: Trace pelvic fluid noted on the right series 2 image 61. No  acute bowel abnormality at the pelvis. Normal appendix. No acute vascular abnormality. Prostate appears normal measuring 3.4 cm. Bladder is minimally distended but otherwise   unremarkable.    MUSCULOSKELETAL: Postoperative change with a skin defect at the posterior low back consistent with the provided history of pilonidal cyst excision. There is some mild subcutaneous edema in this region but no drainable abscess can be seen.      Impression    IMPRESSION:  1.  No abscess can be currently seen at the site of pilonidal cyst excision. There is a skin defect and some subcutaneous edema in this area.  2.  Trace free pelvic fluid.         Medications - No data to display    Assessments & Plan (with Medical Decision Making)   27 year old male with past medical history notable for autism, bilateral disease status post excision 5 days ago with wound VAC which she removed last evening and has swelling under both of his eyes which was concerning to his mother.  No rash or other signs of anaphylaxis.  No reported vomiting or diarrhea.  No swelling of lips or tongue.  No appreciable edema in posterior pharynx.  No stridor or wheezing.  Patient was started on cephalexin yesterday.  I have low suspicion that this is related to an allergic reaction however mom is concerned.  Given this was started out of abundance of caution during his visit yesterday think reasonable to stop Keflex.  [facility].  Unfortunate we do not have wound care staff available here who can replace his wound VAC.  Dad is doing wet-to-dry packing changes twice daily which appears appropriate.  No obvious infection of wound at this time.  No signs of systemic illness.  Signs concerning for anaphylactic reaction discussed with mom and dad.    I have reviewed the nursing notes.         New Prescriptions    No medications on file       Final diagnoses:   Periorbital edema of both eyes   Pilonidal disease      Mauricio Lopez MD    1/29/2023   [facility] EMERGENCY DEPT    Disclaimer: This note consists of words and symbols derived from keyboarding and dictation using voice recognition software.  As a result, there may be errors that have gone undetected.  Please consider this when interpreting information found in this note.             Mauricio Lopez MD  01/29/23 1047

## 2023-01-30 ENCOUNTER — OFFICE VISIT (OUTPATIENT)
Dept: SURGERY | Facility: CLINIC | Age: 28
End: 2023-01-30
Payer: MEDICAID

## 2023-01-30 VITALS
BODY MASS INDEX: 31.29 KG/M2 | WEIGHT: 231 LBS | HEIGHT: 72 IN | OXYGEN SATURATION: 98 % | HEART RATE: 73 BPM | SYSTOLIC BLOOD PRESSURE: 114 MMHG | DIASTOLIC BLOOD PRESSURE: 72 MMHG

## 2023-01-30 DIAGNOSIS — L98.8 PILONIDAL DISEASE: Primary | ICD-10-CM

## 2023-01-30 DIAGNOSIS — Z09 FOLLOW-UP EXAMINATION AFTER COLORECTAL SURGERY: ICD-10-CM

## 2023-01-30 PROCEDURE — 99024 POSTOP FOLLOW-UP VISIT: CPT | Performed by: NURSE PRACTITIONER

## 2023-01-30 NOTE — NURSING NOTE
Chief Complaint   Patient presents with     Post-op Visit       Vitals:    01/30/23 1014   BP: 114/72   BP Location: Left arm   Patient Position: Sitting   Cuff Size: Adult Large   Pulse: 73   SpO2: 98%   Weight: 231 lb   Height: 6'       Body mass index is 31.33 kg/m .    Jasmyn Harris CMA

## 2023-01-30 NOTE — LETTER
2023       RE: Shola Owens Jr.  [id number] [address] [address]     Dear Colleague,    Thank you for referring your patient, Shola Owens Jr., to the [facility] COLON AND RECTAL SURGERY CLINIC [place] at [facility]. Please see a copy of my visit note below.    Colon and Rectal Surgery Postoperative Clinic Note    RE: Shola Owens Jr.  : 1995  ELENA: 2023    Shola Owens Jr. is a very pleasant 27 year old male with autistic disorder and epilepsy with pilonidal disease now status post pilonidal cystectomy with wound VAC placement on 23 with Dr. Helm.    Interval history: Shola has been following for wound care with VAC changes in [place].  He has not had any pain with this but did pull his VAC off on Saturday night.  His dad has been doing wet-to-dry dressing changes with Vashe since then but they would like to give the VAC another chance.  Shola was having a a fever of 100.3 and was brought to the emergency room in [place]. He was started on Keflex.    CT Pelvis 23:  IMPRESSION:  1.  No abscess can be currently seen at the site of pilonidal cyst excision. There is a skin defect and some subcutaneous edema in this area.  2.  Trace free pelvic fluid.    Physical Examination: Exam was chaperoned by Varun Early, EMT-P   /72 (BP Location: Left arm, Patient Position: Sitting, Cuff Size: Adult Large)   Pulse 73   Ht 6'   Wt 231 lb   SpO2 98%   BMI 31.33 kg/m    General: alert, oriented, in no acute distress, sitting comfortably  HEENT: mucous membranes moist  Perianal external examination:  12X6X6 cm wound extending from the top of the  cleft to just above the anus. Good granulation tissue and no tunneling or undermining. VAC was replaced with the help of YEE Rich, using Eli around the anus to provide better seal.     Assessment/Plan:  27 year old male status post pilonidal cystectomy with wound  VAC placement on 1/24/23 with Dr. Helm.  Site appears very healthy.  He did remove the VAC on his own once but his family would like to try this again.  If he does remove it again, would recommend twice daily wet-to-dry dressing changes with Vashe.  No signs of infection.  He is scheduled for follow-up in our clinic again next week but will be seeing the wound clinic for VAC changes in the meantime.  Encouraged them to contact the clinic in the meantime with any questions or concerns. Family's questions were answered to their stated satisfaction and they are  in agreement with this plan.     Medical history:  Past Medical History:   Diagnosis Date     Acne     improved since 2014.      Autistic disorder, current or active state      Mild persistent asthma      Pilonidal disease      Seasonal allergic rhinitis      Seizure disorder (H)        Surgical history:  Past Surgical History:   Procedure Laterality Date     CYSTECTOMY PILONIDAL N/A 1/24/2023    Procedure: ANUS, LAY OPEN PILONIDAL CYSTECTOMY;  Surgeon: Carl Helm MD;  Location: [facility] OR     EXAM UNDER ANESTHESIA ANUS N/A 1/24/2023    Procedure: EXAM UNDER ANESTHESIA, WOUND VAC PLACEMENT;  Surgeon: Carl Helm MD;  Location: [facility] OR     MOUTH SURGERY       [place] teeth removed         Problem list:    Patient Active Problem List    Diagnosis Date Noted     Pilonidal disease 10/25/2022     Priority: Medium     Added automatically from request for surgery [id number]       Closed fracture of maxilla (H) 04/18/2016     Priority: Medium     Seizure 04/30/2015     Priority: Medium     Disturbance in sleep behavior 08/31/2006     Priority: Medium     Doing well on Geodon managed by Dr. Sanders.  05/2013:  Geodon 120 mg BID.  Problem list name updated by automated process. Provider to review       Active autistic disorder 06/13/2005     Priority: Medium     Moderate to severe.  On Tenex and Cymbalta for anxiety.  Meds managed by Dr. Sanders.   [facility].  05/2013: Tenex 1 mg BID.  Started Zyprexa PRN for some increased aggression.  Reflux symptoms have been episodic since 11/2011. Associated with the size of the meals. Portion control is effective.   Problem list name updated by automated process. Provider to review         Medications:  Current Outpatient Medications   Medication Sig Dispense Refill     acetaminophen (TYLENOL) 325 MG tablet Take 3 tablets (975 mg) by mouth 4 times daily for 14 days Alternate with ibuprofen (ADVIL/MOTRIN), IF ordered. 168 tablet 0     cephALEXin (KEFLEX) 500 MG capsule Take 1 capsule (500 mg) by mouth 4 times daily for 10 days 40 capsule 0     cetirizine (ZYRTEC) 10 MG tablet Take 1 tablet (10 mg) by mouth daily (Patient taking differently: Take 10 mg by mouth as needed) 90 tablet 3     divalproex sodium extended-release (DEPAKOTE ER) 500 MG 24 hr tablet Take 3 tablets (1,500 mg) by mouth 2 times daily 540 tablet 0     guanFACINE (TENEX) 1 MG tablet Take 1 mg by mouth 2 times daily       ibuprofen (ADVIL/MOTRIN) 800 MG tablet Take 1 tablet (800 mg) by mouth 3 times daily for 14 days Alternate with acetaminophen (TYLENOL), IF ordered. 42 tablet 0     lamoTRIgine (LAMICTAL) 100 MG tablet Take 2 tablets (200 mg) by mouth 2 times daily 360 tablet 3     methocarbamol (ROBAXIN) 500 MG tablet Take 1 tablet (500 mg) by mouth 4 times daily as needed for muscle spasms 25 tablet 0     montelukast (SINGULAIR) 10 MG tablet Take 1 tablet (10 mg) by mouth At Bedtime (Patient taking differently: Take 10 mg by mouth nightly as needed) 90 tablet 3     Multiple Vitamin (MULTIVITAMINS PO) Take by mouth every morning       OLANZapine (ZYPREXA) 5 MG tablet Take 5 mg by mouth as needed       oxyCODONE (ROXICODONE) 5 MG tablet Take 1-2 tablets (5-10 mg) by mouth every 6 hours as needed for severe pain (7-10) 25 tablet 0     polyethylene glycol (MIRALAX) 17 GM/Dose powder Take 17 g (1 capful) by mouth daily as needed  for constipation (If no bowel movement in 24 hours. Mix in 8 oz of water.  May take twice daily.) 500 g 0     ziprasidone (GEODON) 40 MG capsule Take 1 capsule (40 mg) by mouth 2 times daily (with meals) AM (Patient taking differently: Take 40 mg by mouth 2 times daily (with meals) Total 120 mg) 60 capsule 0     ziprasidone (GEODON) 80 MG capsule Take 1 capsule (80 mg) by mouth 2 times daily (with meals) AM AND PM (Patient taking differently: Take 80 mg by mouth 2 times daily (with meals) AM AND PM Total 120 mg) 60 capsule 0     zonisamide (ZONEGRAN) 100 MG capsule Take 1 capsule (100 mg) by mouth At Bedtime Take 100 mg at bedtime for 2 weeks then Take 200 mg at bedtime for 2 weeks then Take 300 mg at bedtime (Patient taking differently: Take 300 mg by mouth At Bedtime Take 100 mg at bedtime for 2 weeks then Take 200 mg at bedtime for 2 weeks then Take 300 mg at bedtime) 132 capsule 0       Allergies:  Allergies   Allergen Reactions     Risperidone Hives       Family history:  Family History   Problem Relation Age of Onset     Allergies Mother         seasonal     Hypertension Father      Post Operative Nausea and Vomiting Sister      Heart Disease Maternal Grandmother         A. Fib     Thyroid Disease Maternal Grandmother         hypo     Post Operative Nausea and Vomiting Maternal Grandmother      Cancer Maternal Grandfather         cancerous tumor in his arm     Hypertension Paternal Grandmother      Thyroid Disease Paternal Grandmother         hypo     Cancer Paternal Grandfather         passed from lung cancer     Hypertension Maternal Uncle      Post Operative Nausea and Vomiting Other         maternal great uncle     Clotting Disorder No family hx of        Social history:  Social History     Tobacco Use     Smoking status: Never     Smokeless tobacco: Never     Tobacco comments:     outside smokers-not in the house   Substance Use Topics     Alcohol use: No     Alcohol/week: 0.0 standard drinks     Marital  status: single.    Nursing Notes:   Jasmyn Harris  1/30/2023 10:17 AM  Signed  Chief Complaint   Patient presents with     Post-op Visit       Vitals:    01/30/23 1014   BP: 114/72   BP Location: Left arm   Patient Position: Sitting   Cuff Size: Adult Large   Pulse: 73   SpO2: 98%   Weight: 231 lb   Height: 6'       Body mass index is 31.33 kg/m .    Jasmyn Harris CMA         30 minutes spent on the date of the encounter doing chart review, history and exam, documentation and further activities as noted above.   This is a postop visit.    DEYSI Juarez, NP-C  Colon and Rectal Surgery  [facility]    This note was created using speech recognition software and may contain unintended word substitutions.

## 2023-01-30 NOTE — PROGRESS NOTES
Colon and Rectal Surgery Postoperative Clinic Note    RE: Shola Owens Jr.  : 1995  ELENA: 2023    Shola Owens Jr. is a very pleasant 27 year old male with autistic disorder and epilepsy with pilonidal disease now status post pilonidal cystectomy with wound VAC placement on 23 with Dr. Helm.    Interval history: Shola has been following for wound care with VAC changes in [place].  He has not had any pain with this but did pull his VAC off on Saturday night.  His dad has been doing wet-to-dry dressing changes with Vashe since then but they would like to give the VAC another chance.  Shola was having a a fever of 100.3 and was brought to the emergency room in [place]. He was started on Keflex.    CT Pelvis 23:  IMPRESSION:  1.  No abscess can be currently seen at the site of pilonidal cyst excision. There is a skin defect and some subcutaneous edema in this area.  2.  Trace free pelvic fluid.    Physical Examination: Exam was chaperoned by Varun Early, EMT-P   /72 (BP Location: Left arm, Patient Position: Sitting, Cuff Size: Adult Large)   Pulse 73   Ht 6'   Wt 231 lb   SpO2 98%   BMI 31.33 kg/m    General: alert, oriented, in no acute distress, sitting comfortably  HEENT: mucous membranes moist  Perianal external examination:  12X6X6 cm wound extending from the top of the mykel cleft to just above the anus. Good granulation tissue and no tunneling or undermining. VAC was replaced with the help of YEE Rich, using Eli around the anus to provide better seal.     Assessment/Plan:  27 year old male status post pilonidal cystectomy with wound VAC placement on 23 with Dr. Helm.  Site appears very healthy.  He did remove the VAC on his own once but his family would like to try this again.  If he does remove it again, would recommend twice daily wet-to-dry dressing changes with Vashe.  No signs of infection.  He is scheduled for follow-up in our clinic again  next week but will be seeing the wound clinic for VAC changes in the meantime.  Encouraged them to contact the clinic in the meantime with any questions or concerns. Family's questions were answered to their stated satisfaction and they are  in agreement with this plan.     Medical history:  Past Medical History:   Diagnosis Date     Acne     improved since 2014.      Autistic disorder, current or active state      Mild persistent asthma      Pilonidal disease      Seasonal allergic rhinitis      Seizure disorder (H)        Surgical history:  Past Surgical History:   Procedure Laterality Date     CYSTECTOMY PILONIDAL N/A 1/24/2023    Procedure: ANUS, LAY OPEN PILONIDAL CYSTECTOMY;  Surgeon: Carl Helm MD;  Location: [facility] OR     EXAM UNDER ANESTHESIA ANUS N/A 1/24/2023    Procedure: EXAM UNDER ANESTHESIA, WOUND VAC PLACEMENT;  Surgeon: Carl Helm MD;  Location: [facility] OR     MOUTH SURGERY       [place] teeth removed         Problem list:    Patient Active Problem List    Diagnosis Date Noted     Pilonidal disease 10/25/2022     Priority: Medium     Added automatically from request for surgery [id number]       Closed fracture of maxilla (H) 04/18/2016     Priority: Medium     Seizure 04/30/2015     Priority: Medium     Disturbance in sleep behavior 08/31/2006     Priority: Medium     Doing well on Geodon managed by Dr. Sanders.  05/2013:  Geodon 120 mg BID.  Problem list name updated by automated process. Provider to review       Active autistic disorder 06/13/2005     Priority: Medium     Moderate to severe.  On Tenex and Cymbalta for anxiety.  Meds managed by Dr. Sanders.  [facility].  05/2013: Tenex 1 mg BID.  Started Zyprexa PRN for some increased aggression.  Reflux symptoms have been episodic since 11/2011. Associated with the size of the meals. Portion control is effective.   Problem list name updated by automated process. Provider to review          Medications:  Current Outpatient Medications   Medication Sig Dispense Refill     acetaminophen (TYLENOL) 325 MG tablet Take 3 tablets (975 mg) by mouth 4 times daily for 14 days Alternate with ibuprofen (ADVIL/MOTRIN), IF ordered. 168 tablet 0     cephALEXin (KEFLEX) 500 MG capsule Take 1 capsule (500 mg) by mouth 4 times daily for 10 days 40 capsule 0     cetirizine (ZYRTEC) 10 MG tablet Take 1 tablet (10 mg) by mouth daily (Patient taking differently: Take 10 mg by mouth as needed) 90 tablet 3     divalproex sodium extended-release (DEPAKOTE ER) 500 MG 24 hr tablet Take 3 tablets (1,500 mg) by mouth 2 times daily 540 tablet 0     guanFACINE (TENEX) 1 MG tablet Take 1 mg by mouth 2 times daily       ibuprofen (ADVIL/MOTRIN) 800 MG tablet Take 1 tablet (800 mg) by mouth 3 times daily for 14 days Alternate with acetaminophen (TYLENOL), IF ordered. 42 tablet 0     lamoTRIgine (LAMICTAL) 100 MG tablet Take 2 tablets (200 mg) by mouth 2 times daily 360 tablet 3     methocarbamol (ROBAXIN) 500 MG tablet Take 1 tablet (500 mg) by mouth 4 times daily as needed for muscle spasms 25 tablet 0     montelukast (SINGULAIR) 10 MG tablet Take 1 tablet (10 mg) by mouth At Bedtime (Patient taking differently: Take 10 mg by mouth nightly as needed) 90 tablet 3     Multiple Vitamin (MULTIVITAMINS PO) Take by mouth every morning       OLANZapine (ZYPREXA) 5 MG tablet Take 5 mg by mouth as needed       oxyCODONE (ROXICODONE) 5 MG tablet Take 1-2 tablets (5-10 mg) by mouth every 6 hours as needed for severe pain (7-10) 25 tablet 0     polyethylene glycol (MIRALAX) 17 GM/Dose powder Take 17 g (1 capful) by mouth daily as needed for constipation (If no bowel movement in 24 hours. Mix in 8 oz of water.  May take twice daily.) 500 g 0     ziprasidone (GEODON) 40 MG capsule Take 1 capsule (40 mg) by mouth 2 times daily (with meals) AM (Patient taking differently: Take 40 mg by mouth 2 times daily (with meals) Total 120 mg) 60 capsule 0      ziprasidone (GEODON) 80 MG capsule Take 1 capsule (80 mg) by mouth 2 times daily (with meals) AM AND PM (Patient taking differently: Take 80 mg by mouth 2 times daily (with meals) AM AND PM Total 120 mg) 60 capsule 0     zonisamide (ZONEGRAN) 100 MG capsule Take 1 capsule (100 mg) by mouth At Bedtime Take 100 mg at bedtime for 2 weeks then Take 200 mg at bedtime for 2 weeks then Take 300 mg at bedtime (Patient taking differently: Take 300 mg by mouth At Bedtime Take 100 mg at bedtime for 2 weeks then Take 200 mg at bedtime for 2 weeks then Take 300 mg at bedtime) 132 capsule 0       Allergies:  Allergies   Allergen Reactions     Risperidone Hives       Family history:  Family History   Problem Relation Age of Onset     Allergies Mother         seasonal     Hypertension Father      Post Operative Nausea and Vomiting Sister      Heart Disease Maternal Grandmother         A. Fib     Thyroid Disease Maternal Grandmother         hypo     Post Operative Nausea and Vomiting Maternal Grandmother      Cancer Maternal Grandfather         cancerous tumor in his arm     Hypertension Paternal Grandmother      Thyroid Disease Paternal Grandmother         hypo     Cancer Paternal Grandfather         passed from lung cancer     Hypertension Maternal Uncle      Post Operative Nausea and Vomiting Other         maternal great uncle     Clotting Disorder No family hx of        Social history:  Social History     Tobacco Use     Smoking status: Never     Smokeless tobacco: Never     Tobacco comments:     outside smokers-not in the house   Substance Use Topics     Alcohol use: No     Alcohol/week: 0.0 standard drinks     Marital status: single.    Nursing Notes:   Jasmyn Harris  1/30/2023 10:17 AM  Signed  Chief Complaint   Patient presents with     Post-op Visit       Vitals:    01/30/23 1014   BP: 114/72   BP Location: Left arm   Patient Position: Sitting   Cuff Size: Adult Large   Pulse: 73   SpO2: 98%   Weight: 231 lb   Height:  6'       Body mass index is 31.33 kg/m .    Jasmyn Harris CMA         30 minutes spent on the date of the encounter doing chart review, history and exam, documentation and further activities as noted above.   This is a postop visit.    DEYSI Juarez, NP-C  Colon and Rectal Surgery  [facility]    This note was created using speech recognition software and may contain unintended word substitutions.

## 2023-02-01 ENCOUNTER — HOSPITAL ENCOUNTER (OUTPATIENT)
Dept: WOUND CARE | Facility: CLINIC | Age: 28
Discharge: HOME | End: 2023-02-01
Attending: NURSE PRACTITIONER | Admitting: NURSE PRACTITIONER
Payer: MEDICAID

## 2023-02-01 PROCEDURE — 97605 NEG PRS WND THER DME<=50SQCM: CPT

## 2023-02-01 PROCEDURE — G0463 HOSPITAL OUTPT CLINIC VISIT: HCPCS | Mod: 25

## 2023-02-01 NOTE — PROGRESS NOTES
[facility]    Start of Care in [facility] Wound Clinic: 2023   Referring Provider: Dr. Helm  Primary Care Provider: [facility]   Wound Location: Sacrum     Wound Clinic Visit: 3rd    Dr. Lopez assessed the pt's wound during today's appointment r/t bleeding. Verbal orders received for silver nitrate to wound bed.    Reason for Visit: VAC change (surgery date 23)     Subjective: Pt had a number of issues since seen in clinic on Friday. He went to the ED r/t a fever and was given antibiotics. Then the pt's mom thought he was having an allergic reaction to the antibiotics so he went back to the ED. On Saturday night he pulled off the VAC tubing so his dad had to remove the VAC and switch to wet to dry dressings. VAC dressing last changed on Monday at his follow up with colorectal.    Pt is on Keflex 500 mg 4x/day for 10 days (started on 23).    Pt presents with dad.      Wound History:   From a clinic visit 10/18/22 with Rosa Maria Pugh:      HPI:  Has had a pilonidal cyst for a few years that drains intermittently. He has fallen a few times (due to epilepsy and falling on ice) that has caused it to open up. Continuously drains now. No pain. No fevers or chills. No surgeries at the site. Does not smoke. Does not work. No diabetes.    Perianal external examination:  Four large pilonidal pits in the mykel and gluteal cleft with hair present in these. The one closest to the anus with some granulation tissue. Cyst cavity, currently healed over, to the right of the top of the  cleft...     Assessment/Plan: 27 year old male with pilonidal disease. Fairly constant drainage but no significant pain. He has pretty extensive pilonidal disease on exam. Multiple pits in the gluteal and mykel cleft tracking to a cyst cavity to the right of the top of the  cleft. Heavy hair burden. I discussed surgical intervention with his mother. I would  be concerned about healing from a flap closure with his autism and epilepsy. However, disease is fairly extensive and would have a large wound after healing, which could be challenging to heal. His mother would provide wound cares. Will discuss with Dr. Helm for an opinion and follow up with patient's mother. Patient's questions were answered to his stated satisfaction and he is in agreement with this plan.    From Op Report 1/24/23:  FINDINGS:  Three large midline pits associated with a large pilonidal cyst that was just right of the midline.  All of the lesions were quite deep in nature.  There was no evidence of fistulization towards the anus.  No active infection.    Past Medical History:  Patient Active Problem List   Diagnosis     Active autistic disorder     Disturbance in sleep behavior     Seizure     Closed fracture of maxilla (H)     Pilonidal disease                     Tobacco Use:     Tobacco Use      Smoking status: Never      Smokeless tobacco: Never      Tobacco comments: outside smokers-not in the house       Diabetic: no    Personal/social history:  Pt is autistic, lives with his parents    Objective:   Current treatment plan: VAC  Last changed: Monday by colorectal clinic    Wound #1 Sacrum    No new photos/measurements  Stage/tissue depth: Full thickness  Length/depth measured with wound held open; width measured with wound at rest  14cm L x 3.5cm (widest) W x 4.7cm D  Tunneling: no  Undermining: no  Wound bed type/amount: mix of fat with more granulation tissue; non fluctuant  Wound Edges: attached  Periwound: intact  Drainage: canister just changed prior to appointment so only scant amount noted; when dressing was removed, this writer noted a trace amount of blood pulsing from the wound bed. Pressure applied by pt's dad while this writer went to get assistance. Other specialty clinic nurse CARMEN Lovett applied pressure while this writer got Dr. Lopez. With pressure, bleeding stopped. Silver  "nitrate x2 sticks applied to area also  Odor: no  Pain: pt vocalizing increased pain today compared to Friday (and compared to when dad had to remove dressing over weekend); with gentle cares and distraction (and premedicating with oxy) pt tolerated cares well    Mobility: independent    Other callusing/areas of concern: no    Diet: not discussed    Discussed/Education: silver nitrate use, use of contact layer prior to granufoam (prior) how to remove VAC dressing and perform wet to dry dressing if needed, alarms with VACs and troubleshooting, plan of care with rationale    Assessment:  Surgical wound s/p pilonidal surgery with VAC having good seal since Monday  Drainage is heavy to copious  Pt pulled off hose x1 over weekend  Pt with pulsating bleeding noted during VAC change from proximal area of wound bed; stopped with pressure    Factors impacting wound healing:   Location of wound and ability to maintain a good seal (as only about 1.5cm between wound edge and anus)  Pt known to \"pick\" r/t autism    Plan:  Continuing NPWT at -125mmHg with bridge to left hip so that Trac PAD isn't on pt's coccyx area. Attempted to use a barrier ring along distal wound edge but wasn't sticking well to skin. Able to get a good seal though with drape alone.    Topical Care:   Silver nitrate sticks x2  Shaved periwound    Wound cleansed with Vashe soak for 5-10 minutes. Periwound skin cleansed with Vashe and gauze.3M Cavilon No Sting Barrier Film to periwound and allowed to dry. Drape to area of bridge. Mepitel One applied to 1 piece black granufoam the applied to wound bed; bridged to left hip. Pt's dad donned gloves and helped with holding buttocks open to allow for application of drape for optimal seal. VAC on with good suction at visit end at -125mmHg.    Additional recommendations:   Once surgery says it's ok to shower, you can either leave the VAC dressing on or take it off for Shola to shower before coming to his clinic " "appointments.    Use the drape as needed to secure any loose areas. You can use the \"putty\" at the edge right near the rectum if needed and then apply drape over this.    You don't want the wound VAC to be ON with it not suctioning for >2 hours. Switch to wet to dry dressing instead.    The following discharge instructions were reviewed with and sent home with the patient:  Visit Summary/General Recommendations    Once surgery says it's ok to shower, you can either leave the VAC dressing on or take it off for Shola to shower before coming to his clinic appointments.    Use the drape as needed to secure any loose areas. You can use the \"putty\" at the edge right near the rectum if needed and then apply drape over this.    You don't want the wound VAC to be ON with it not suctioning for >2 hours. If this happens, do the below steps:    Bandage Change  Wash your hands before and after the dressing change. You may wear gloves if you choose. Gloves are available over the counter at [facility], [facility], [facility], etc.   Use scissors at home that you can designate solely for wound care and cleanse well with each use, (rubbing alcohol or alcohol pads work well for this).    Wash hands.  Remove old dressing.  Wash hands.  1.) Moisten the rolled gauze (cut to the length that you need) with Vashe. Squeeze out any excess (it should be moist but not dripping).  2.) Cover with an ABD pad and secure with tape (apply as little as needed).    Change dressing twice a day (more often if it's draining heavily)    Signs and symptoms of infection:  Bright green drainage  Foul odor  Increasing pain in area  New redness or swelling at the site of the wound or streaks up from wound  New warmth to touch around wound accompanied by redness and swelling  Fever    Need to be seen as soon as possible for infection concerns.    The following supplies were sent home with the patient:  none    Return visit: Friday    Verbal, written (prior), & " demonstrative education provided.  Face to face time: approximately 65 minutes  Procedure: NPWT <50cm2    Aleah Mcknight RN, CWOCN  [phone]

## 2023-02-02 LAB
BACTERIA BLD CULT: NO GROWTH
BACTERIA BLD CULT: NO GROWTH

## 2023-02-02 NOTE — PROGRESS NOTES
Colon and Rectal Surgery Postoperative Clinic Note    RE: Shola Owens Jr.  : 1995  ELENA: 2023    Shola Owens Jr. is a very pleasant 27 year old male with autistic disorder and epilepsy with pilonidal disease now status post pilonidal cystectomy with wound VAC placement on 23 with Dr. Helm.     Interval history: Saw Rosa Maria Melton NP in clinic last week. He did pull off the vac the weekend before seeing her, so they were doing wet-to-dry dressings with Vashe. Wound vac was replaced in clinic. Since then, he has been going to the wound clinic in [place] for wound vac changes. He pulled off the wound vac again on Saturday. This is the third time. His dad has been doing wet-to-dry dressing with Vashe since then and this is preferable for them. Shola seems more comfortable with this.     Physical Examination: Exam was chaperoned by Cliff Lewis, EMT-P   /83 (BP Location: Left arm, Patient Position: Sitting, Cuff Size: Adult Regular)   Pulse 80   SpO2 100%   General: alert, oriented, in no acute distress  Perianal external examination: Large surgical wound with bed of healthy granulation tissue, some fibrinous material. Clotted area present. No active bleeding.       Assessment/Plan:  27 year old male with autistic disorder and epilepsy with pilonidal disease now status post pilonidal cystectomy with wound VAC placement on 23 with Dr. Helm. Shola is doing well, surgical wound looks healthy. Will start with twice daily wet-to-dry Vashe dressings since Shola has been pulling off the wound vac and it does not get a good seal in between changes. Okay to shower and wash the wound. Continue with wound clinic visits three times a week for about another month. Contact our clinic with concerns. Patient's questions were answered to his stated satisfaction and he is in agreement with this plan.     Medical history:  Past Medical History:   Diagnosis Date     Acne     improved  since 2014.      Autistic disorder, current or active state      Mild persistent asthma      Pilonidal disease      Seasonal allergic rhinitis      Seizure disorder (H)        Surgical history:  Past Surgical History:   Procedure Laterality Date     CYSTECTOMY PILONIDAL N/A 1/24/2023    Procedure: ANUS, LAY OPEN PILONIDAL CYSTECTOMY;  Surgeon: Carl Helm MD;  Location: [facility] OR     EXAM UNDER ANESTHESIA ANUS N/A 1/24/2023    Procedure: EXAM UNDER ANESTHESIA, WOUND VAC PLACEMENT;  Surgeon: Carl Helm MD;  Location: [facility] OR     MOUTH SURGERY       [place] teeth removed         Problem list:    Patient Active Problem List    Diagnosis Date Noted     Pilonidal disease 10/25/2022     Priority: Medium     Added automatically from request for surgery [id number]       Closed fracture of maxilla (H) 04/18/2016     Priority: Medium     Seizure 04/30/2015     Priority: Medium     Disturbance in sleep behavior 08/31/2006     Priority: Medium     Doing well on Geodon managed by Dr. Sanders.  05/2013:  Geodon 120 mg BID.  Problem list name updated by automated process. Provider to review       Active autistic disorder 06/13/2005     Priority: Medium     Moderate to severe.  On Tenex and Cymbalta for anxiety.  Meds managed by Dr. Sanders.  [facility].  05/2013: Tenex 1 mg BID.  Started Zyprexa PRN for some increased aggression.  Reflux symptoms have been episodic since 11/2011. Associated with the size of the meals. Portion control is effective.   Problem list name updated by automated process. Provider to review         Medications:  Current Outpatient Medications   Medication Sig Dispense Refill     acetaminophen (TYLENOL) 325 MG tablet Take 3 tablets (975 mg) by mouth 4 times daily for 14 days Alternate with ibuprofen (ADVIL/MOTRIN), IF ordered. 168 tablet 0     cephALEXin (KEFLEX) 500 MG capsule Take 1 capsule (500 mg) by mouth 4 times daily for 10 days 40 capsule 0     cetirizine  (ZYRTEC) 10 MG tablet Take 1 tablet (10 mg) by mouth daily (Patient taking differently: Take 10 mg by mouth as needed) 90 tablet 3     divalproex sodium extended-release (DEPAKOTE ER) 500 MG 24 hr tablet Take 3 tablets (1,500 mg) by mouth 2 times daily 540 tablet 0     guanFACINE (TENEX) 1 MG tablet Take 1 mg by mouth 2 times daily       ibuprofen (ADVIL/MOTRIN) 800 MG tablet Take 1 tablet (800 mg) by mouth 3 times daily for 14 days Alternate with acetaminophen (TYLENOL), IF ordered. 42 tablet 0     lamoTRIgine (LAMICTAL) 100 MG tablet Take 2 tablets (200 mg) by mouth 2 times daily 360 tablet 3     methocarbamol (ROBAXIN) 500 MG tablet Take 1 tablet (500 mg) by mouth 4 times daily as needed for muscle spasms 25 tablet 0     montelukast (SINGULAIR) 10 MG tablet Take 1 tablet (10 mg) by mouth At Bedtime (Patient taking differently: Take 10 mg by mouth nightly as needed) 90 tablet 3     Multiple Vitamin (MULTIVITAMINS PO) Take by mouth every morning       OLANZapine (ZYPREXA) 5 MG tablet Take 5 mg by mouth as needed       oxyCODONE (ROXICODONE) 5 MG tablet Take 1-2 tablets (5-10 mg) by mouth every 6 hours as needed for severe pain (7-10) 25 tablet 0     polyethylene glycol (MIRALAX) 17 GM/Dose powder Take 17 g (1 capful) by mouth daily as needed for constipation (If no bowel movement in 24 hours. Mix in 8 oz of water.  May take twice daily.) 500 g 0     ziprasidone (GEODON) 40 MG capsule Take 1 capsule (40 mg) by mouth 2 times daily (with meals) AM (Patient taking differently: Take 40 mg by mouth 2 times daily (with meals) Total 120 mg) 60 capsule 0     ziprasidone (GEODON) 80 MG capsule Take 1 capsule (80 mg) by mouth 2 times daily (with meals) AM AND PM (Patient taking differently: Take 80 mg by mouth 2 times daily (with meals) AM AND PM Total 120 mg) 60 capsule 0     zonisamide (ZONEGRAN) 100 MG capsule Take 1 capsule (100 mg) by mouth At Bedtime Take 100 mg at bedtime for 2 weeks then Take 200 mg at bedtime for 2  weeks then Take 300 mg at bedtime (Patient taking differently: Take 300 mg by mouth At Bedtime Take 100 mg at bedtime for 2 weeks then Take 200 mg at bedtime for 2 weeks then Take 300 mg at bedtime) 132 capsule 0       Allergies:  Allergies   Allergen Reactions     Risperidone Hives       Family history:  Family History   Problem Relation Age of Onset     Allergies Mother         seasonal     Hypertension Father      Post Operative Nausea and Vomiting Sister      Heart Disease Maternal Grandmother         A. Fib     Thyroid Disease Maternal Grandmother         hypo     Post Operative Nausea and Vomiting Maternal Grandmother      Cancer Maternal Grandfather         cancerous tumor in his arm     Hypertension Paternal Grandmother      Thyroid Disease Paternal Grandmother         hypo     Cancer Paternal Grandfather         passed from lung cancer     Hypertension Maternal Uncle      Post Operative Nausea and Vomiting Other         maternal great uncle     Clotting Disorder No family hx of        Social history:  Social History     Tobacco Use     Smoking status: Never     Smokeless tobacco: Never     Tobacco comments:     outside smokers-not in the house   Substance Use Topics     Alcohol use: No     Alcohol/week: 0.0 standard drinks     Marital status: single.    Nursing Notes:   Cliff Lewis, EMT  2/6/2023 10:20 AM  Signed  Chief Complaint   Patient presents with     Post-op Visit       Vitals:    02/06/23 1018   BP: 138/83   BP Location: Left arm   Patient Position: Sitting   Cuff Size: Adult Regular   Pulse: 80   SpO2: 100%       There is no height or weight on file to calculate BMI.    Cliff eLwis EMT-P         20 minutes spent on the date of the encounter doing chart review, history and exam, documentation and further activities as noted above.   This is a postop visit.      Citlali Monroe PA-C  Colon and Rectal Surgery  [facility]

## 2023-02-03 ENCOUNTER — HOSPITAL ENCOUNTER (OUTPATIENT)
Dept: WOUND CARE | Facility: CLINIC | Age: 28
Discharge: HOME | End: 2023-02-03
Attending: NURSE PRACTITIONER | Admitting: NURSE PRACTITIONER
Payer: MEDICAID

## 2023-02-03 PROCEDURE — 97605 NEG PRS WND THER DME<=50SQCM: CPT

## 2023-02-03 NOTE — PROGRESS NOTES
[facility]    Start of Care in [facility]ound Clinic: 2023   Referring Provider: Dr. Helm  Primary Care Provider: [place] [facility]   Wound Location: Sacrum    Wound Clinic Visit: 4th    Reason for Visit: VAC change (surgery date 23)     Subjective: Pt's dad states the VAC would lose suction when the pt had a bowel movement but then would reseal after he was finished. Besides that, they deny issues with the VAC.    Pt is on Keflex 500 mg 4x/day for 10 days (started on 23).    Pt presents with dad.      Wound History:   From a clinic visit 10/18/22 with Rosa Maria Pugh:      HPI:  Has had a pilonidal cyst for a few years that drains intermittently. He has fallen a few times (due to epilepsy and falling on ice) that has caused it to open up. Continuously drains now. No pain. No fevers or chills. No surgeries at the site. Does not smoke. Does not work. No diabetes.    Perianal external examination:  Four large pilonidal pits in the mykel and gluteal cleft with hair present in these. The one closest to the anus with some granulation tissue. Cyst cavity, currently healed over, to the right of the top of the mykel cleft...     Assessment/Plan: 27 year old male with pilonidal disease. Fairly constant drainage but no significant pain. He has pretty extensive pilonidal disease on exam. Multiple pits in the gluteal and mykel cleft tracking to a cyst cavity to the right of the top of the  cleft. Heavy hair burden. I discussed surgical intervention with his mother. I would be concerned about healing from a flap closure with his autism and epilepsy. However, disease is fairly extensive and would have a large wound after healing, which could be challenging to heal. His mother would provide wound cares. Will discuss with Dr. Helm for an opinion and follow up with patient's mother. Patient's questions were answered to his stated  "satisfaction and he is in agreement with this plan.    From Op Report 1/24/23:  FINDINGS:  Three large midline pits associated with a large pilonidal cyst that was just right of the midline.  All of the lesions were quite deep in nature.  There was no evidence of fistulization towards the anus.  No active infection.    Past Medical History:  Patient Active Problem List   Diagnosis     Active autistic disorder     Disturbance in sleep behavior     Seizure     Closed fracture of maxilla (H)     Pilonidal disease                     Tobacco Use:     Tobacco Use      Smoking status: Never      Smokeless tobacco: Never      Tobacco comments: outside smokers-not in the house       Diabetic: no    Personal/social history:  Pt is autistic, lives with his parents    Objective:   Current treatment plan: VAC with Mepitel One to granufoam prior to application to wound bed  Last changed: Wednesday    Wound #1 Sacrum      Stage/tissue depth: Full thickness  Length/depth measured with wound held open; width measured with wound at rest  13.5cm L x 3cm x 4.5cm D  Tunneling: no  Undermining: no  Wound bed type/amount: early granulation - filling in really well; some pale areas in wound bed most likely r/t use of silver nitrate on Wednesday. About 5% tan. Also note area of silver nitrate used at about 11/12:00 where area was bleeding  Wound Edges: attached  Periwound: intact; some areas that appear to be folliculitis under VAC drape  Drainage: canister just changed prior to appointment so only scant amount noted (dad states it had 200-250mL of light red drainage); pulsing bleeding noted from area at 11/1200 during VAC change; last changed on Wednesday so drainage is decreasing  Odor: no  Pain: pain was improved compared to last VAC change with use of Mepitel One; pt did state \"ouch, ouch, ouch\" a few times during cares; with gentle cares and distraction (and premedicating with oxy) pt tolerated cares well    Mobility: " "independent    Other callusing/areas of concern: no    Diet: not discussed    Discussed/Education: silver nitrate use, use of contact layer prior to granufoam (prior) how to remove VAC dressing and perform wet to dry dressing if needed, alarms with VACs and troubleshooting, plan of care with rationale    Assessment:  Surgical wound s/p pilonidal surgery with VAC having good seal except with bowel movements (but resealed after bowel movements and no stool noted on drape)  Drainage is heavy to copious - canister changed both Wed and Fri  Pt with pulsing bleeding noted during VAC change from proximal area of wound bed; stopped with pressure and use of silver nitrate    Factors impacting wound healing:   Location of wound and ability to maintain a good seal (as only about 1.5cm between wound edge and anus)  Pt known to \"pick\" r/t autism    Plan:  Continuing NPWT at -125mmHg with bridge to left hip so that Trac PAD isn't on pt's coccyx area. Able to get a good seal though with drape alone.    Topical Care:   Silver nitrate sticks x1 (verbal orders per Dr. Gomzeo)    Wound cleansed with Vashe soak for 5-10 minutes. Periwound skin cleansed with Vashe and gauze. 3M Cavilon No Sting Barrier Film to periwound and allowed to dry. Drape to area of bridge. Mepitel One applied to 1 piece black granufoam then applied to wound bed; bridged to left hip. Pt's dad donned gloves and helped with holding buttocks open to allow for application of drape for optimal seal. VAC on with good suction at visit end at -125mmHg.    Additional recommendations:   Once surgery says it's ok to shower, you can either leave the VAC dressing on or take it off for Shola to shower before coming to his clinic appointments.    Use the drape as needed to secure any loose areas. You can use the \"putty\" at the edge right near the rectum if needed and then apply drape over this.    You don't want the wound VAC to be ON with it not suctioning for >2 hours. Switch to " "wet to dry dressing instead.    The following discharge instructions were reviewed with and sent home with the patient:  Visit Summary/General Recommendations    Once surgery says it's ok to shower, you can either leave the VAC dressing on or take it off for Shola to shower before coming to his clinic appointments.    Use the drape as needed to secure any loose areas. You can use the \"putty\" at the edge right near the rectum if needed and then apply drape over this.    You don't want the wound VAC to be ON with it not suctioning for >2 hours. If this happens, do the below steps:    Bandage Change  Wash your hands before and after the dressing change. You may wear gloves if you choose. Gloves are available over the counter at [facility], [facility], [facility], etc.   Use scissors at home that you can designate solely for wound care and cleanse well with each use, (rubbing alcohol or alcohol pads work well for this).    Wash hands.  Remove old dressing.  Wash hands.  1.) Moisten the rolled gauze (cut to the length that you need) with Vashe. Squeeze out any excess (it should be moist but not dripping).  2.) Cover with an ABD pad and secure with tape (apply as little as needed).    Change dressing twice a day (more often if it's draining heavily)    Signs and symptoms of infection:  Bright green drainage  Foul odor  Increasing pain in area  New redness or swelling at the site of the wound or streaks up from wound  New warmth to touch around wound accompanied by redness and swelling  Fever    Need to be seen as soon as possible for infection concerns.    The following supplies were sent home with the patient:  none    Return visit: Wednesday (will be seen by colorectal on Monday)    Verbal, written (prior), & demonstrative education provided.  Face to face time: approximately 45 minutes  Procedure: NPWT <50cm2    Aleah Mcknight RN, CWOCN  [phone]  "

## 2023-02-06 ENCOUNTER — OFFICE VISIT (OUTPATIENT)
Dept: SURGERY | Facility: CLINIC | Age: 28
End: 2023-02-06
Payer: MEDICAID

## 2023-02-06 VITALS — HEART RATE: 80 BPM | SYSTOLIC BLOOD PRESSURE: 138 MMHG | DIASTOLIC BLOOD PRESSURE: 83 MMHG | OXYGEN SATURATION: 100 %

## 2023-02-06 DIAGNOSIS — Z09 FOLLOW-UP EXAMINATION AFTER COLORECTAL SURGERY: Primary | ICD-10-CM

## 2023-02-06 PROCEDURE — 99024 POSTOP FOLLOW-UP VISIT: CPT

## 2023-02-06 NOTE — NURSING NOTE
Chief Complaint   Patient presents with     Post-op Visit       Vitals:    02/06/23 1018   BP: 138/83   BP Location: Left arm   Patient Position: Sitting   Cuff Size: Adult Regular   Pulse: 80   SpO2: 100%       There is no height or weight on file to calculate BMI.    Cliff Lewis EMT-P

## 2023-02-06 NOTE — LETTER
2023       RE: Shola Owens Jr.  [address] [address] [place] [id number]     Dear Colleague,    Thank you for referring your patient, Shola Owesn Jr., to the [facility] [place] [facility]. Please see a copy of my visit note below.    Colon and Rectal Surgery Postoperative Clinic Note    RE: Shola Owens Jr.  : 1995  ELENA: 2023    Shola Owens Jr. is a very pleasant 27 year old male with autistic disorder and epilepsy with pilonidal disease now status post pilonidal cystectomy with wound VAC placement on 23 with Dr. Helm.     Interval history: Saw Rosa Maria Melton NP in clinic last week. He did pull off the vac the weekend before seeing her, so they were doing wet-to-dry dressings with Vashe. Wound vac was replaced in clinic. Since then, he has been going to the wound clinic in [place] for wound vac changes. He pulled off the wound vac again on Saturday. This is the third time. His dad has been doing wet-to-dry dressing with Vashe since then and this is preferable for them. [place] seems more comfortable with this.     Physical Examination: Exam was chaperoned by Cliff Lewis, EMT-P   /83 (BP Location: Left arm, Patient Position: Sitting, Cuff Size: Adult Regular)   Pulse 80   SpO2 100%   General: alert, oriented, in no acute distress  Perianal external examination: Large surgical wound with bed of healthy granulation tissue, some fibrinous material. Clotted area present. No active bleeding.       Assessment/Plan:  27 year old male with autistic disorder and epilepsy with pilonidal disease now status post pilonidal cystectomy with wound VAC placement on 23 with Dr. Helm. Shola is doing well, surgical wound looks healthy. Will start with twice daily wet-to-dry Vashe dressings since Shola has been pulling off the wound vac and it does not get a good seal in between changes.  Okay to shower and wash the wound. Continue with wound clinic visits three times a week for about another month. Contact our clinic with concerns. Patient's questions were answered to his stated satisfaction and he is in agreement with this plan.     Medical history:  Past Medical History:   Diagnosis Date     Acne     improved since 2014.      Autistic disorder, current or active state      Mild persistent asthma      Pilonidal disease      Seasonal allergic rhinitis      Seizure disorder (H)        Surgical history:  Past Surgical History:   Procedure Laterality Date     CYSTECTOMY PILONIDAL N/A 1/24/2023    Procedure: ANUS, LAY OPEN PILONIDAL CYSTECTOMY;  Surgeon: Carl Helm MD;  Location: [facility] OR     EXAM UNDER ANESTHESIA ANUS N/A 1/24/2023    Procedure: EXAM UNDER ANESTHESIA, WOUND VAC PLACEMENT;  Surgeon: Carl Helm MD;  Location: [facility] OR     MOUTH SURGERY       [place] teeth removed         Problem list:    Patient Active Problem List    Diagnosis Date Noted     Pilonidal disease 10/25/2022     Priority: Medium     Added automatically from request for surgery [id number]       Closed fracture of maxilla (H) 04/18/2016     Priority: Medium     Seizure 04/30/2015     Priority: Medium     Disturbance in sleep behavior 08/31/2006     Priority: Medium     Doing well on Geodon managed by Dr. Sanders.  05/2013:  Geodon 120 mg BID.  Problem list name updated by automated process. Provider to review       Active autistic disorder 06/13/2005     Priority: Medium     Moderate to severe.  On Tenex and Cymbalta for anxiety.  Meds managed by Dr. Sanders.  [facility].  05/2013: Tenex 1 mg BID.  Started Zyprexa PRN for some increased aggression.  Reflux symptoms have been episodic since 11/2011. Associated with the size of the meals. Portion control is effective.   Problem list name updated by automated process. Provider to review         Medications:  Current Outpatient  Medications   Medication Sig Dispense Refill     acetaminophen (TYLENOL) 325 MG tablet Take 3 tablets (975 mg) by mouth 4 times daily for 14 days Alternate with ibuprofen (ADVIL/MOTRIN), IF ordered. 168 tablet 0     cephALEXin (KEFLEX) 500 MG capsule Take 1 capsule (500 mg) by mouth 4 times daily for 10 days 40 capsule 0     cetirizine (ZYRTEC) 10 MG tablet Take 1 tablet (10 mg) by mouth daily (Patient taking differently: Take 10 mg by mouth as needed) 90 tablet 3     divalproex sodium extended-release (DEPAKOTE ER) 500 MG 24 hr tablet Take 3 tablets (1,500 mg) by mouth 2 times daily 540 tablet 0     guanFACINE (TENEX) 1 MG tablet Take 1 mg by mouth 2 times daily       ibuprofen (ADVIL/MOTRIN) 800 MG tablet Take 1 tablet (800 mg) by mouth 3 times daily for 14 days Alternate with acetaminophen (TYLENOL), IF ordered. 42 tablet 0     lamoTRIgine (LAMICTAL) 100 MG tablet Take 2 tablets (200 mg) by mouth 2 times daily 360 tablet 3     methocarbamol (ROBAXIN) 500 MG tablet Take 1 tablet (500 mg) by mouth 4 times daily as needed for muscle spasms 25 tablet 0     montelukast (SINGULAIR) 10 MG tablet Take 1 tablet (10 mg) by mouth At Bedtime (Patient taking differently: Take 10 mg by mouth nightly as needed) 90 tablet 3     Multiple Vitamin (MULTIVITAMINS PO) Take by mouth every morning       OLANZapine (ZYPREXA) 5 MG tablet Take 5 mg by mouth as needed       oxyCODONE (ROXICODONE) 5 MG tablet Take 1-2 tablets (5-10 mg) by mouth every 6 hours as needed for severe pain (7-10) 25 tablet 0     polyethylene glycol (MIRALAX) 17 GM/Dose powder Take 17 g (1 capful) by mouth daily as needed for constipation (If no bowel movement in 24 hours. Mix in 8 oz of water.  May take twice daily.) 500 g 0     ziprasidone (GEODON) 40 MG capsule Take 1 capsule (40 mg) by mouth 2 times daily (with meals) AM (Patient taking differently: Take 40 mg by mouth 2 times daily (with meals) Total 120 mg) 60 capsule 0     ziprasidone (GEODON) 80 MG capsule  Take 1 capsule (80 mg) by mouth 2 times daily (with meals) AM AND PM (Patient taking differently: Take 80 mg by mouth 2 times daily (with meals) AM AND PM Total 120 mg) 60 capsule 0     zonisamide (ZONEGRAN) 100 MG capsule Take 1 capsule (100 mg) by mouth At Bedtime Take 100 mg at bedtime for 2 weeks then Take 200 mg at bedtime for 2 weeks then Take 300 mg at bedtime (Patient taking differently: Take 300 mg by mouth At Bedtime Take 100 mg at bedtime for 2 weeks then Take 200 mg at bedtime for 2 weeks then Take 300 mg at bedtime) 132 capsule 0       Allergies:  Allergies   Allergen Reactions     Risperidone Hives       Family history:  Family History   Problem Relation Age of Onset     Allergies Mother         seasonal     Hypertension Father      Post Operative Nausea and Vomiting Sister      Heart Disease Maternal Grandmother         A. Fib     Thyroid Disease Maternal Grandmother         hypo     Post Operative Nausea and Vomiting Maternal Grandmother      Cancer Maternal Grandfather         cancerous tumor in his arm     Hypertension Paternal Grandmother      Thyroid Disease Paternal Grandmother         hypo     Cancer Paternal Grandfather         passed from lung cancer     Hypertension Maternal Uncle      Post Operative Nausea and Vomiting Other         maternal great uncle     Clotting Disorder No family hx of        Social history:  Social History     Tobacco Use     Smoking status: Never     Smokeless tobacco: Never     Tobacco comments:     outside smokers-not in the house   Substance Use Topics     Alcohol use: No     Alcohol/week: 0.0 standard drinks     Marital status: single.    Nursing Notes:   Cliff Lewis, EMT  2/6/2023 10:20 AM  Signed  Chief Complaint   Patient presents with     Post-op Visit       Vitals:    02/06/23 1018   BP: 138/83   BP Location: Left arm   Patient Position: Sitting   Cuff Size: Adult Regular   Pulse: 80   SpO2: 100%       There is no height or weight on file to calculate  BMI.    Cliff Lewis EMT-P         20 minutes spent on the date of the encounter doing chart review, history and exam, documentation and further activities as noted above.   This is a postop visit.      Citlali Monroe PA-C  Colon and Rectal Surgery  [facility]

## 2023-02-08 ENCOUNTER — HOSPITAL ENCOUNTER (OUTPATIENT)
Dept: WOUND CARE | Facility: CLINIC | Age: 28
Discharge: HOME | End: 2023-02-08
Attending: NURSE PRACTITIONER | Admitting: NURSE PRACTITIONER
Payer: MEDICAID

## 2023-02-08 DIAGNOSIS — G40.909 SEIZURE DISORDER (H): Primary | ICD-10-CM

## 2023-02-08 DIAGNOSIS — L98.8 PILONIDAL DISEASE: Primary | ICD-10-CM

## 2023-02-08 PROCEDURE — G0463 HOSPITAL OUTPT CLINIC VISIT: HCPCS

## 2023-02-08 PROCEDURE — A6260 WOUND CLEANSER ANY TYPE/SIZE: HCPCS

## 2023-02-08 RX ORDER — ZONISAMIDE 100 MG/1
300 CAPSULE ORAL AT BEDTIME
Qty: 90 CAPSULE | Refills: 11 | Status: SHIPPED | OUTPATIENT
Start: 2023-02-08 | End: 2024-01-29

## 2023-02-08 NOTE — TELEPHONE ENCOUNTER
Call from patients mother.  She wanted to make sure that we were aware that Shola had not supplies of the medication left.  I confirmed with Chris that Shola is currently taking zonisamide 300 mg every bedtime.  Preferred pharmacy confirmed    Prescription routed to , SMS sent requesting refill

## 2023-02-08 NOTE — TELEPHONE ENCOUNTER
Patients father called requesting a refill for zonisamide send to [facility] Patient is completely out and father is worried that he will have another seizure. Original medication was not prescribed by Dr. Lemus It was given to patient by ED doctor.   Patient has an appt scheduled with Dr. Lemus for 02/13/2023, would like medication before than, doesn't think patient can be off of medication for that long.       [facility], [place] - [address] [place] [id number]  Phone: [phone] Fax: [phone]

## 2023-02-08 NOTE — PROGRESS NOTES
[facility]    Start of Care in [facility]: 2023   Referring Provider: Dr. Helm  Primary Care Provider: [facility]   Wound Location: Sacrum    Wound Clinic Visit: 5th    Reason for Visit: VAC change (surgery date 23)     Subjective: The pt pulled the VAC off on Saturday. They saw colorectal on Monday and the decision was made to discontinue the VAC with ongoing plan for packing the wound twice daily. Pt's dad states wound cares are going well.    Pt presents with dad.      Wound History:   From a clinic visit 10/18/22 with Rosa Maria Pugh:      HPI:  Has had a pilonidal cyst for a few years that drains intermittently. He has fallen a few times (due to epilepsy and falling on ice) that has caused it to open up. Continuously drains now. No pain. No fevers or chills. No surgeries at the site. Does not smoke. Does not work. No diabetes.    Perianal external examination:  Four large pilonidal pits in the  and gluteal cleft with hair present in these. The one closest to the anus with some granulation tissue. Cyst cavity, currently healed over, to the right of the top of the mykel cleft...     Assessment/Plan: 27 year old male with pilonidal disease. Fairly constant drainage but no significant pain. He has pretty extensive pilonidal disease on exam. Multiple pits in the gluteal and  cleft tracking to a cyst cavity to the right of the top of the  cleft. Heavy hair burden. I discussed surgical intervention with his mother. I would be concerned about healing from a flap closure with his autism and epilepsy. However, disease is fairly extensive and would have a large wound after healing, which could be challenging to heal. His mother would provide wound cares. Will discuss with Dr. Helm for an opinion and follow up with patient's mother. Patient's questions were answered to his stated satisfaction and he is in  "agreement with this plan.    From Op Report 1/24/23:  FINDINGS:  Three large midline pits associated with a large pilonidal cyst that was just right of the midline.  All of the lesions were quite deep in nature.  There was no evidence of fistulization towards the anus.  No active infection.    Past Medical History:  Patient Active Problem List   Diagnosis     Active autistic disorder     Disturbance in sleep behavior     Seizure     Closed fracture of maxilla (H)     Pilonidal disease                     Tobacco Use:     Tobacco Use      Smoking status: Never      Smokeless tobacco: Never      Tobacco comments: outside smokers-not in the house       Diabetic: no    Personal/social history:  Pt is autistic, lives with his parents    Objective:   Current treatment plan: Vashe moistened gauze, ABD pad secured with tape; disposable brief to help contain   Last changed: This morning    Wound #1 Sacrum        Above photo taken 2/6 at Colorectal appointment    No new measurements/photos 2/8  Stage/tissue depth: Full thickness  Length/depth measured with wound held open; width measured with wound at rest  13.5cm L x 3cm x 4.5cm D  Tunneling: no  Undermining: no  Wound bed type/amount: majority of wound bed is clean granular similar to above photo; about 80% red and 20% yellow/cream (same area as above photo); dark areas in wound bed have resolved; non fluctuant  Wound Edges: attached  Periwound: intact; some areas that appear to be resolving folliculitis   Drainage: moderate serosanguineous on prior gauze  Odor: no  Pain: none voiced    Mobility: independent    Other callusing/areas of concern: no    Diet: not discussed      Assessment:  Surgical wound s/p pilonidal surgery healing well with wet to dry dressing changes    VAC discontinued 2/6/23 as pt pulled off over weekend    Factors impacting wound healing:   Pt known to \"pick\" r/t autism    Plan:  Discussed/Education: Plan of care with caregiver. Will continue with wet " to dry dressings for continued autolytic debridement of wound bed. They will continue to clean wound bed with saline or Vashe and gauze, pat dry. Adding Critic-Aid to periwound to prevent maceration. They will continue to use Vashe moistened gauze to wound bed, gently packed with 8x10 ABD pad to cover and Medipore tape to secure. This writer will order AMD gauze for packing to be moistened with saline as Vashe is not covered by insurance. Dressing to be changed twice daily. Also instructed them on sending back wound VAC. Dad voiced understanding with education, declined updated instructions.    Topical Care:   Wound cleansed with Vashe and gauze and then bandaged per below instructions. Did apply Critic-Aid Barrier Paste to periwound to prevent maceration.    Additional recommendations: none    The following discharge instructions were reviewed with and sent home with the patient (no new; see above):      The following supplies were sent home with the patient:  ABD pads 8x10 x6  5 sample rolls Medipore tape  2 rolls Kerlix  Bottle of Vashe    Return visit: Wednesday weekly    Verbal, written (prior), & demonstrative education provided.  Face to face time: approximately 20 minutes  Procedure: autolytic debridement    Aleah Mcknight RN, CWOCN  [phone]

## 2023-02-13 ENCOUNTER — VIRTUAL VISIT (OUTPATIENT)
Dept: NEUROLOGY | Facility: CLINIC | Age: 28
End: 2023-02-13
Payer: MEDICAID

## 2023-02-13 VITALS — HEIGHT: 73 IN | WEIGHT: 233 LBS | BODY MASS INDEX: 30.88 KG/M2

## 2023-02-13 DIAGNOSIS — G40.909 SEIZURE DISORDER (H): ICD-10-CM

## 2023-02-13 RX ORDER — LAMOTRIGINE 100 MG/1
200 TABLET ORAL 2 TIMES DAILY
Qty: 360 TABLET | Refills: 3 | Status: SHIPPED | OUTPATIENT
Start: 2023-02-13 | End: 2024-02-29

## 2023-02-13 RX ORDER — DIVALPROEX SODIUM 500 MG/1
1500 TABLET, EXTENDED RELEASE ORAL 2 TIMES DAILY
Qty: 540 TABLET | Refills: 3 | Status: SHIPPED | OUTPATIENT
Start: 2023-02-13 | End: 2024-02-28

## 2023-02-13 NOTE — PATIENT INSTRUCTIONS
Times of Days  am pm        Medication Tablet Size Number of Tablets/Capsules Total Daily Dosage    Lamictal  100  2 2       400 mg    Depakote  500  3 3       3000 mg    Zonegran  100   3       300 mg                                                                                               Carry this with you at all times.  CONTINUE TAKING YOUR OTHER MEDICATIONS AS PREVIOUSLY DIRECTED.      * * *Do not store medications in the bathroom.  Keep medications away from children!* * *

## 2023-02-13 NOTE — NURSING NOTE
Pt unable to complete PHQ-9.     Is the patient currently in the state of [place]? YES    Visit mode:VIDEO    If the visit is dropped, the patient can be reconnected by: VIDEO VISIT: Text to cell phone: [phone]    Will anyone else be joining the visit? Yes, pt's stepparent Willis will be joining.       How would you like to obtain your AVS? Mail a copy    Are changes needed to the allergy or medication list? NO    Comments or concerns regarding today's visit: No    Medication and allergies have been reviewed.     Dread Juarez, VF

## 2023-02-13 NOTE — PROGRESS NOTES
CHIEF COMPLAINT: Follow up for seizures.     HISTORY OF PRESENT ILLNESS:  Video call for follow up for seizures. His stepfather was on the conference call. This patient is a 27-year-old, right-handed male with a history of autistic spectrum disorder.  The patient himself cannot provide much history, and the majority of the history is provided by his stepfather.  He was last seen about 5 months ago.  In 2022, he was having almost one GTC every months. He did not miss any meds. He was seen in the ED in Aug and Dec.  Zonegran was added in Dec. 2022. No seizures since Zonegran was added. He is little bit more irritable.  He is currently taking Lamotrigine 200 mg bid and Depakote 1500 - 1500 mg, Zonegran 300 mg at bedtime. His recent EEG showed occasional bifrontal spike and wave activities.     Patient had seizure onset in April 2015, another seizure 10 days after that,  both were described as GTCs.  He was initially started on keppra.  It was stopped because of GI side effects.  Then he was started on Lamotrigine, Depakote was added later.  He is currently on Lamictal 100mg bid and Depakote 500-1000.  He had behavioral problems when he was on Lamictal 150 mg bid.  His behavior has been really good for the past 2 years.  He had a seizure on 4/12/2016 that caused fractures of the right zygomatic arch, maxillary sinus and orbit.        According to the mother, on 04/30/2015, the patient was found at home on the kitchen floor. He was cyanotic. He had foaming at the mouth. He had some bleeding from the nose. He was unresponsive, and he had some tonic-clonic movement of his extremities. The clonic-tonic movement lasted for about 1 minute. The mother did not see the beginning of the event. There was no loss of bowel or bladder control. The patient did not bite his tongue. Afterwards the patient was very lethargic, still unresponsive until he was in the ambulance; then he was able to recognize his father and was able to  communicate a little bit.    The patient had no prior history of seizures. He has been taking Geodon for 7 years. No recent medication changes. No recent illness. When EMS arrived at the scene, the blood sugar was found to be 81.      TRIGGERS FOR SEIZURES: Unclear.    RISK FACTORS FOR SEIZURES: He has a history of autistic spectrum disorder. No history of head trauma with loss of consciousness. No history of CNS infection. No history of febrile convulsions.      Current Outpatient Medications   Medication Sig Dispense Refill     divalproex sodium extended-release (DEPAKOTE ER) 500 MG 24 hr tablet Take 3 tablets (1,500 mg) by mouth 2 times daily 540 tablet 0     guanFACINE (TENEX) 1 MG tablet Take 1 mg by mouth 2 times daily       lamoTRIgine (LAMICTAL) 100 MG tablet Take 2 tablets (200 mg) by mouth 2 times daily 360 tablet 3     montelukast (SINGULAIR) 10 MG tablet Take 1 tablet (10 mg) by mouth At Bedtime (Patient taking differently: Take 10 mg by mouth nightly as needed) 90 tablet 3     Multiple Vitamin (MULTIVITAMINS PO) Take by mouth every morning       OLANZapine (ZYPREXA) 5 MG tablet Take 5 mg by mouth as needed       ziprasidone (GEODON) 40 MG capsule Take 1 capsule (40 mg) by mouth 2 times daily (with meals) AM (Patient taking differently: Take 40 mg by mouth 2 times daily (with meals) Total 120 mg) 60 capsule 0     ziprasidone (GEODON) 80 MG capsule Take 1 capsule (80 mg) by mouth 2 times daily (with meals) AM AND PM (Patient taking differently: Take 80 mg by mouth 2 times daily (with meals) AM AND PM Total 120 mg) 60 capsule 0     zonisamide (ZONEGRAN) 100 MG capsule Take 3 capsules (300 mg) by mouth At Bedtime 90 capsule 11     cetirizine (ZYRTEC) 10 MG tablet Take 1 tablet (10 mg) by mouth daily (Patient not taking: Reported on 2/13/2023) 90 tablet 3     methocarbamol (ROBAXIN) 500 MG tablet Take 1 tablet (500 mg) by mouth 4 times daily as needed for muscle spasms (Patient not taking: Reported on  2/13/2023) 25 tablet 0     oxyCODONE (ROXICODONE) 5 MG tablet Take 1-2 tablets (5-10 mg) by mouth every 6 hours as needed for severe pain (7-10) (Patient not taking: Reported on 2/13/2023) 25 tablet 0     polyethylene glycol (MIRALAX) 17 GM/Dose powder Take 17 g (1 capful) by mouth daily as needed for constipation (If no bowel movement in 24 hours. Mix in 8 oz of water.  May take twice daily.) (Patient not taking: Reported on 2/13/2023) 500 g 0         PAST MEDICAL HISTORY: Autistic disorder, sleep disturbances.       FAMILY HISTORY: No family history of seizures. Grandmother had a history of migraine headaches. Mother had a history of anxiety and panic attacks. Father had a history of chemical dependency.    SOCIAL HISTORY: He is living with his parents. He had special education. He is single. No alcohol, no drug abuse, no smoking. He is not working.     PHYSICAL EXAMINATION:      Alert, oriented to name and place. No facial weakness.     REVIEW OF SYSTEMS: Negative.     PREVIOUS DIAGNOSTIC TESTING: MRI of the brain on 04/30 showed a negative study. EEG on 05/01 showed a normal study.      IMPRESSION:    1. Intractable seizures.      He was last seen about 5 months ago.  In 2022, he was having almost one GTC every months. He did not miss any meds. He was seen in the ED in Aug and Dec.  Zonegran was added in Dec. 2022. No seizures since Zonegran was added. He is little bit more irritable.  He is currently taking Lamotrigine 200 mg bid and Depakote 1500 - 1500 mg, Zonegran 300 mg at bedtime.     2. Autistic disorder.    3. Sleep disturbances.      PLAN:    1. Continue Lamotrigine 200 mg bid, Zonegran 300 mg at bedtime and Depakote 1500 mg bid.  2. Check Labs Lamictal, Zonegran, VPA, AST, Plt.   3. Return to clinic in 3 months. Other options include Vimpat, OXC, etc.      33 min total time was spent on the day of this visit.      18 min was spent on face to face time  15 min was spent on preparation of visit to review  charts and labs, ordering medications and tests, and documentation of clinical information

## 2023-02-13 NOTE — PROGRESS NOTES
Video-Visit Details    Type of service:  Video Visit    Video Start Time (time video started): 02:24 pm    Video End Time (time video stopped): 02:42 pm    Originating Location (pt. Location): Home        Distant Location (provider location):  On-site    Mode of Communication:  Video Conference via [employer]

## 2023-02-13 NOTE — LETTER
2023     RE: Shloa Owens Jr.  : 1995   MRN: [id number]      Dear Colleague,    Thank you for referring your patient, Shola Owens Jr., to the [facility] at [facility]. Please see a copy of my visit note below.    Video-Visit Details    Type of service:  Video Visit    Video Start Time (time video started): 02:24 pm    Video End Time (time video stopped): 02:42 pm    Originating Location (pt. Location): Home        Distant Location (provider location):  On-site    Mode of Communication:  Video Conference via [employer]          CHIEF COMPLAINT: Follow up for seizures.     HISTORY OF PRESENT ILLNESS:  Video call for follow up for seizures. His stepfather was on the conference call. This patient is a 27-year-old, right-handed male with a history of autistic spectrum disorder.  The patient himself cannot provide much history, and the majority of the history is provided by his stepfather.  He was last seen about 5 months ago.  In , he was having almost one GTC every months. He did not miss any meds. He was seen in the ED in Aug and Dec.  Zonegran was added in Dec. 2022. No seizures since Zonegran was added. He is little bit more irritable.  He is currently taking Lamotrigine 200 mg bid and Depakote 1500 - 1500 mg, Zonegran 300 mg at bedtime. His recent EEG showed occasional bifrontal spike and wave activities.     Patient had seizure onset in 2015, another seizure 10 days after that,  both were described as GTCs.  He was initially started on keppra.  It was stopped because of GI side effects.  Then he was started on Lamotrigine, Depakote was added later.  He is currently on Lamictal 100mg bid and Depakote 500-1000.  He had behavioral problems when he was on Lamictal 150 mg bid.  His behavior has been really good for the past 2 years.  He had a seizure on 2016 that caused fractures of the right zygomatic arch, maxillary sinus and  orbit.        According to the mother, on 04/30/2015, the patient was found at home on the kitchen floor. He was cyanotic. He had foaming at the mouth. He had some bleeding from the nose. He was unresponsive, and he had some tonic-clonic movement of his extremities. The clonic-tonic movement lasted for about 1 minute. The mother did not see the beginning of the event. There was no loss of bowel or bladder control. The patient did not bite his tongue. Afterwards the patient was very lethargic, still unresponsive until he was in the ambulance; then he was able to recognize his father and was able to communicate a little bit.    The patient had no prior history of seizures. He has been taking Geodon for 7 years. No recent medication changes. No recent illness. When EMS arrived at the scene, the blood sugar was found to be 81.      TRIGGERS FOR SEIZURES: Unclear.    RISK FACTORS FOR SEIZURES: He has a history of autistic spectrum disorder. No history of head trauma with loss of consciousness. No history of CNS infection. No history of febrile convulsions.      Current Outpatient Medications   Medication Sig Dispense Refill     divalproex sodium extended-release (DEPAKOTE ER) 500 MG 24 hr tablet Take 3 tablets (1,500 mg) by mouth 2 times daily 540 tablet 0     guanFACINE (TENEX) 1 MG tablet Take 1 mg by mouth 2 times daily       lamoTRIgine (LAMICTAL) 100 MG tablet Take 2 tablets (200 mg) by mouth 2 times daily 360 tablet 3     montelukast (SINGULAIR) 10 MG tablet Take 1 tablet (10 mg) by mouth At Bedtime (Patient taking differently: Take 10 mg by mouth nightly as needed) 90 tablet 3     Multiple Vitamin (MULTIVITAMINS PO) Take by mouth every morning       OLANZapine (ZYPREXA) 5 MG tablet Take 5 mg by mouth as needed       ziprasidone (GEODON) 40 MG capsule Take 1 capsule (40 mg) by mouth 2 times daily (with meals) AM (Patient taking differently: Take 40 mg by mouth 2 times daily (with meals) Total 120 mg) 60 capsule 0      ziprasidone (GEODON) 80 MG capsule Take 1 capsule (80 mg) by mouth 2 times daily (with meals) AM AND PM (Patient taking differently: Take 80 mg by mouth 2 times daily (with meals) AM AND PM Total 120 mg) 60 capsule 0     zonisamide (ZONEGRAN) 100 MG capsule Take 3 capsules (300 mg) by mouth At Bedtime 90 capsule 11     cetirizine (ZYRTEC) 10 MG tablet Take 1 tablet (10 mg) by mouth daily (Patient not taking: Reported on 2/13/2023) 90 tablet 3     methocarbamol (ROBAXIN) 500 MG tablet Take 1 tablet (500 mg) by mouth 4 times daily as needed for muscle spasms (Patient not taking: Reported on 2/13/2023) 25 tablet 0     oxyCODONE (ROXICODONE) 5 MG tablet Take 1-2 tablets (5-10 mg) by mouth every 6 hours as needed for severe pain (7-10) (Patient not taking: Reported on 2/13/2023) 25 tablet 0     polyethylene glycol (MIRALAX) 17 GM/Dose powder Take 17 g (1 capful) by mouth daily as needed for constipation (If no bowel movement in 24 hours. Mix in 8 oz of water.  May take twice daily.) (Patient not taking: Reported on 2/13/2023) 500 g 0         PAST MEDICAL HISTORY: Autistic disorder, sleep disturbances.       FAMILY HISTORY: No family history of seizures. Grandmother had a history of migraine headaches. Mother had a history of anxiety and panic attacks. Father had a history of chemical dependency.    SOCIAL HISTORY: He is living with his parents. He had special education. He is single. No alcohol, no drug abuse, no smoking. He is not working.     PHYSICAL EXAMINATION:      Alert, oriented to name and place. No facial weakness.     REVIEW OF SYSTEMS: Negative.     PREVIOUS DIAGNOSTIC TESTING: MRI of the brain on 04/30 showed a negative study. EEG on 05/01 showed a normal study.      IMPRESSION:    1. Intractable seizures.      He was last seen about 5 months ago.  In 2022, he was having almost one GTC every months. He did not miss any meds. He was seen in the ED in Aug and Dec.  Zonegran was added in Dec. 2022. No  seizures since Zonegran was added. He is little bit more irritable.  He is currently taking Lamotrigine 200 mg bid and Depakote 1500 - 1500 mg, Zonegran 300 mg at bedtime.     2. Autistic disorder.    3. Sleep disturbances.      PLAN:    1. Continue Lamotrigine 200 mg bid, Zonegran 300 mg at bedtime and Depakote 1500 mg bid.  2. Check Labs Lamictal, Zonegran, VPA, AST, Plt.   3. Return to clinic in 3 months. Other options include Vimpat, OXC, etc.    33 min total time was spent on the day of this visit.    18 min was spent on face to face time  15 min was spent on preparation of visit to review charts and labs, ordering medications and tests, and documentation of clinical information    Again, thank you for allowing me to participate in the care of your patient.      Sincerely,    Austen Lemus MD

## 2023-02-15 ENCOUNTER — HOSPITAL ENCOUNTER (OUTPATIENT)
Dept: WOUND CARE | Facility: CLINIC | Age: 28
Discharge: HOME | End: 2023-02-15
Attending: NURSE PRACTITIONER | Admitting: NURSE PRACTITIONER
Payer: MEDICAID

## 2023-02-15 DIAGNOSIS — L98.8 PILONIDAL DISEASE: Primary | ICD-10-CM

## 2023-02-15 PROCEDURE — G0463 HOSPITAL OUTPT CLINIC VISIT: HCPCS

## 2023-02-15 NOTE — PROGRESS NOTES
[facility]    Start of Care in [facility]: 2023   Referring Provider: Dr. Helm  Primary Care Provider: [facility]   Wound Location: Sacrum    Wound Clinic Visit: 6th    Reason for Visit: Wound reassess     Subjective: Dad denies any issues with wound/wound care.    Pt presents with dad.      Wound History:   From a clinic visit 10/18/22 with Rosa Maria Pugh:      HPI:  Has had a pilonidal cyst for a few years that drains intermittently. He has fallen a few times (due to epilepsy and falling on ice) that has caused it to open up. Continuously drains now. No pain. No fevers or chills. No surgeries at the site. Does not smoke. Does not work. No diabetes.    Perianal external examination:  Four large pilonidal pits in the mykel and gluteal cleft with hair present in these. The one closest to the anus with some granulation tissue. Cyst cavity, currently healed over, to the right of the top of the  cleft...     Assessment/Plan: 27 year old male with pilonidal disease. Fairly constant drainage but no significant pain. He has pretty extensive pilonidal disease on exam. Multiple pits in the gluteal and  cleft tracking to a cyst cavity to the right of the top of the mykel cleft. Heavy hair burden. I discussed surgical intervention with his mother. I would be concerned about healing from a flap closure with his autism and epilepsy. However, disease is fairly extensive and would have a large wound after healing, which could be challenging to heal. His mother would provide wound cares. Will discuss with Dr. Helm for an opinion and follow up with patient's mother. Patient's questions were answered to his stated satisfaction and he is in agreement with this plan.    From Op Report 23:  FINDINGS:  Three large midline pits associated with a large pilonidal cyst that was just right of the midline.  All of the lesions were  "quite deep in nature.  There was no evidence of fistulization towards the anus.  No active infection.    Past Medical History:  Patient Active Problem List   Diagnosis     Active autistic disorder     Disturbance in sleep behavior     Seizure     Closed fracture of maxilla (H)     Pilonidal disease                     Tobacco Use:     Tobacco Use      Smoking status: Never      Smokeless tobacco: Never      Tobacco comments: outside smokers-not in the house       Diabetic: no    Personal/social history:  Pt is autistic, lives with his parents    Objective:   Current treatment plan: Vashe moistened gauze, ABD pad secured with tape; disposable brief to help contain   Last changed: This morning    Wound #1 Sacrum    Stage/tissue depth: Full thickness  Length/depth measured with wound held open; width measured with wound at rest  11cm L x 3cm (widest at rest) x 4cm D  Tunneling: no  Undermining: no  Wound bed type/amount: 90% red granular, 10% clean white, translucent cream (see photo); non fluctuant  Wound Edges: attached  Periwound: intact; see photo - diffuse areas of either adhesive related dermatitis or folliculitis or combination  Drainage: heavy serosanguineous  Odor: no  Pain: minimal voiced with removal of tape (did use adhesive remover)    Mobility: independent    Other callusing/areas of concern: no    Diet: not discussed      Assessment:  Surgical wound s/p pilonidal surgery healing well with wet to dry dressing changes    Factors impacting wound healing:   Pt known to \"pick\" r/t autism    Plan:  Discussed/Education: Plan of care with caregiver- will continue with wet to dry dressings for continued autolytic debridement of wound bed.     Topical Care:   Wound cleansed with Vashe and gauze and then bandaged per above instructions. Critic-Aid Barrier Paste to periwound to prevent maceration. Vashe moistened gauze to wound bed to gently pack wound bed, 8x10 ABD pad to cover (folded in half) and Medipore tape to " secure. Dressing to be changed twice daily.    Additional recommendations: none    The following discharge instructions were reviewed with and sent home with the patient (no new; see above):    The following supplies were sent home with the patient:  Remainder of woven gauze package    Return visit: Wednesday weekly    Verbal, written (prior), & demonstrative education provided.  Face to face time: approximately 15 minutes  Procedure: none    Aleah Mcknight RN, CWOCN  [phone]

## 2023-02-22 ENCOUNTER — HOSPITAL ENCOUNTER (OUTPATIENT)
Dept: WOUND CARE | Facility: CLINIC | Age: 28
Discharge: HOME | End: 2023-02-22
Attending: NURSE PRACTITIONER | Admitting: NURSE PRACTITIONER
Payer: MEDICAID

## 2023-02-22 PROCEDURE — A6260 WOUND CLEANSER ANY TYPE/SIZE: HCPCS

## 2023-02-22 PROCEDURE — G0463 HOSPITAL OUTPT CLINIC VISIT: HCPCS

## 2023-02-22 NOTE — PROGRESS NOTES
[facility]    Start of Care in [facility]: 2023   Referring Provider: Dr. Helm  Primary Care Provider: [facility]   Wound Location: Sacrum    Wound Clinic Visit: 7th    Reason for Visit: Wound reassess     Subjective: Dad denies any issues with wound/wound care.    Pt presents with dad.      Wound History:   From a clinic visit 10/18/22 with Rosa Maria Pugh:      HPI:  Has had a pilonidal cyst for a few years that drains intermittently. He has fallen a few times (due to epilepsy and falling on ice) that has caused it to open up. Continuously drains now. No pain. No fevers or chills. No surgeries at the site. Does not smoke. Does not work. No diabetes.    Perianal external examination:  Four large pilonidal pits in the mykel and gluteal cleft with hair present in these. The one closest to the anus with some granulation tissue. Cyst cavity, currently healed over, to the right of the top of the  cleft...     Assessment/Plan: 27 year old male with pilonidal disease. Fairly constant drainage but no significant pain. He has pretty extensive pilonidal disease on exam. Multiple pits in the gluteal and  cleft tracking to a cyst cavity to the right of the top of the mykel cleft. Heavy hair burden. I discussed surgical intervention with his mother. I would be concerned about healing from a flap closure with his autism and epilepsy. However, disease is fairly extensive and would have a large wound after healing, which could be challenging to heal. His mother would provide wound cares. Will discuss with Dr. Helm for an opinion and follow up with patient's mother. Patient's questions were answered to his stated satisfaction and he is in agreement with this plan.    From Op Report 23:  FINDINGS:  Three large midline pits associated with a large pilonidal cyst that was just right of the midline.  All of the lesions were  "quite deep in nature.  There was no evidence of fistulization towards the anus.  No active infection.    Past Medical History:  Patient Active Problem List   Diagnosis     Active autistic disorder     Disturbance in sleep behavior     Seizure     Closed fracture of maxilla (H)     Pilonidal disease                     Tobacco Use:     Tobacco Use      Smoking status: Never      Smokeless tobacco: Never      Tobacco comments: outside smokers-not in the house       Diabetic: no    Personal/social history:  Pt is autistic, lives with his parents    Objective:   Current treatment plan: Vashe moistened gauze or AMD gauze moistened with saline, ABD pad secured with tape; disposable brief to help contain   Last changed: This morning    Wound #1 Sacrum        Stage/tissue depth: Full thickness  Length/depth measured with wound held open; width measured with wound at rest  11cm L x 2.5cm (widest at rest) x 3cm D  Tunneling: no  Undermining: no  Wound bed type/amount: 100% red granular; non fluctuant  Wound Edges: attached  Periwound: intact; see photo - diffuse areas of either adhesive related dermatitis or folliculitis or combination  Drainage: moderate serosanguineous  Odor: no  Pain: minimal voiced with gentle packing of wound bed; tolerated cares very well    Mobility: independent    Other callusing/areas of concern: no    Diet: not discussed      Assessment:  Surgical wound s/p pilonidal surgery healing well, fully granular, with wet to dry dressing changes    Factors impacting wound healing:   Pt known to \"pick\" r/t autism    Plan:  Discussed/Education: Plan of care with caregiver- will continue with wet to dry dressings for continued autolytic debridement of wound bed. They can decrease the amount of gauze to fill wound bed and can use either AMD gauze with saline or plain woven gauze with Vashe.    Topical Care:   Wound cleansed with Vashe and gauze and then bandaged per above instructions. Critic-Aid Barrier Paste " to periwound to prevent maceration. Vashe moistened gauze to wound bed to gently pack wound bed, 8x10 ABD pad to cover (folded in half) and Medipore tape to secure. Dressing to be changed twice daily.    Additional recommendations: none    The following discharge instructions were reviewed with and sent home with the patient (no new; see above):    The following supplies were sent home with the patient:  Remainder of woven gauze package  Bottle of Vashe    Return visit: 2 weeks    Verbal, written (prior), & demonstrative education provided.  Face to face time: approximately 15 minutes  Procedure: none    Aleah Mcknight RN, CWOCN  [phone]

## 2023-03-07 ENCOUNTER — TELEPHONE (OUTPATIENT)
Dept: WOUND CARE | Facility: CLINIC | Age: 28
End: 2023-03-07
Payer: MEDICAID

## 2023-03-07 DIAGNOSIS — Z48.89 ENCOUNTER FOR POSTOPERATIVE WOUND CARE: ICD-10-CM

## 2023-03-07 DIAGNOSIS — L98.8 PILONIDAL DISEASE: Primary | ICD-10-CM

## 2023-03-07 NOTE — TELEPHONE ENCOUNTER
Call from pt's mom, (who put dad on phone,) as pt was to be seen by wound care nurse for follow-up tomorrow however appt was cancelled due to provider illness.  He has next follow-up on 22nd. Per dad, they will be needing supplies.  Need 8x0 abd pad by tomorrow, remainder of supplies are adequate until can be sent by [employer] (usually 3-4 days for delivery).  Writer will set some aside in clinic and place order for additional supplies from [employer].     Tatum Chaudhary RN, CWOCN

## 2023-03-22 ENCOUNTER — HOSPITAL ENCOUNTER (OUTPATIENT)
Dept: WOUND CARE | Facility: CLINIC | Age: 28
Discharge: HOME | End: 2023-03-22
Attending: NURSE PRACTITIONER | Admitting: NURSE PRACTITIONER
Payer: MEDICAID

## 2023-03-22 DIAGNOSIS — L98.8 PILONIDAL DISEASE: Primary | ICD-10-CM

## 2023-03-22 PROCEDURE — G0463 HOSPITAL OUTPT CLINIC VISIT: HCPCS

## 2023-03-22 PROCEDURE — A6260 WOUND CLEANSER ANY TYPE/SIZE: HCPCS

## 2023-03-22 NOTE — PROGRESS NOTES
[facility]    Start of Care in [facility]: 2023   Referring Provider: Dr. Helm  Primary Care Provider: [facility]   Wound Location: Sacrum    Wound Clinic Visit: follow up    Reason for Visit: Wound reassess     Subjective: Dad denies any issues with wound. States they never received supplies ordered at the beginning of March. They haven't been using the ABD pads and the pt has been pulling out the gauze (so most likely the dressing has only been in place 3-4 hours/day).    Pt presents with dad.     Last visit was canceled due to this clinician being ill.     Wound History:   From a clinic visit 10/18/22 with Rosa Maria Pugh:      HPI:  Has had a pilonidal cyst for a few years that drains intermittently. He has fallen a few times (due to epilepsy and falling on ice) that has caused it to open up. Continuously drains now. No pain. No fevers or chills. No surgeries at the site. Does not smoke. Does not work. No diabetes.    Perianal external examination:  Four large pilonidal pits in the  and gluteal cleft with hair present in these. The one closest to the anus with some granulation tissue. Cyst cavity, currently healed over, to the right of the top of the mykel cleft...     Assessment/Plan: 27 year old male with pilonidal disease. Fairly constant drainage but no significant pain. He has pretty extensive pilonidal disease on exam. Multiple pits in the gluteal and  cleft tracking to a cyst cavity to the right of the top of the mykel cleft. Heavy hair burden. I discussed surgical intervention with his mother. I would be concerned about healing from a flap closure with his autism and epilepsy. However, disease is fairly extensive and would have a large wound after healing, which could be challenging to heal. His mother would provide wound cares. Will discuss with Dr. Helm for an opinion and follow up with patient's  "mother. Patient's questions were answered to his stated satisfaction and he is in agreement with this plan.    From Op Report 1/24/23:  FINDINGS:  Three large midline pits associated with a large pilonidal cyst that was just right of the midline.  All of the lesions were quite deep in nature.  There was no evidence of fistulization towards the anus.  No active infection.    Past Medical History:  Patient Active Problem List   Diagnosis     Active autistic disorder     Disturbance in sleep behavior     Seizure     Closed fracture of maxilla (H)     Pilonidal disease                     Tobacco Use:     Tobacco Use      Smoking status: Never      Smokeless tobacco: Never      Tobacco comments: outside smokers-not in the house       Diabetic: no    Personal/social history:  Pt is autistic, lives with his parents    Objective:   Current treatment plan: Vashe moistened gauze or AMD gauze moistened with saline, ABD pad secured with tape; disposable brief to help contain   Last changed: This morning    Wound #1 Sacrum    Stage/tissue depth: Full thickness  Length/depth measured with wound held open; width measured with wound at rest  8.5cm L x 1cm (widest at rest) x 0.5cm D  Tunneling: no  Undermining: no  Wound bed type/amount: 100% red granular; non fluctuant  Wound Edges: attached  Periwound: intact; very scant maceration, scar, scant dry proximally  Drainage: moderate serosanguineous on gauze  Odor: no  Pain: minimal voiced with gentle packing of wound bed; tolerated cares very well    Mobility: independent    Other callusing/areas of concern: no    Diet: not discussed      Assessment:  Surgical wound s/p pilonidal surgery healing well, fully granular, with wet to dry dressing changes    Factors impacting wound healing:   Pt known to \"pick\" r/t autism    Plan:  Discussed/Education: Plan of care with caregiver- will continue with wet to dry dressings for continued autolytic debridement of wound bed.     1. Cleanse wound " and surrounding skin with Vashe or saline and gauze. Pat dry.  2. Critic-Aid Barrier Paste to periwound.  3. 1/4 of 1 piece of non-woven gauze moistened with Vashe or saline to wound bed.  4. 1/2 5x9 ABD pad to cover and for drainage absorption secured with Medipore tape.    To be changed twice daily.    Topical Care:   Wound cleansed with Vashe and gauze and then bandaged per plan. Critic-Aid Barrier Paste to periwound to prevent maceration. Vashe moistened gauze to wound bed to gently pack wound bed (to decrease amount as wound gets smaller), 1/2 5x9 ABD pad to cover and Medipore tape to secure. Dressing to be changed twice daily.    Additional recommendations: none    The following discharge instructions were reviewed with and sent home with the patient (no new; see above):    The following supplies were sent home with the patient:  Remainder of Critic-Aid Barrier Paste  Bottle of Vashe  7 5x9 ABD pads  Several packages of sterile non-woven gauze  3 sample rolls of Medipore tape    Return visit: prn - they will call if follow up needed    Verbal, written (prior), & demonstrative education provided.  Face to face time: approximately 20 minutes  Procedure: none    Aleah Mcknight RN, CWOCN  [phone]

## 2023-07-31 ENCOUNTER — TELEPHONE (OUTPATIENT)
Dept: NEUROLOGY | Facility: CLINIC | Age: 28
End: 2023-07-31
Payer: MEDICAID

## 2023-07-31 NOTE — TELEPHONE ENCOUNTER
What is the concern that needs to be addressed by a nurse?     Shola Owens 's father, Willis called stating that patient had a seizure the morning of 07/31/23. Willis states that seizure lasted about 5 seconds. Willis describes the seizure as very mild, Shola did not lose consciousness and was up and functioning after 30 seconds. Willis also notes that patient has been having bad behaviors recently.     Willis states that he will take patient to a [place] site to have blood levels drawn, per orders previously placed in patient's chart. Patient is not scheduled for a follow up visit with Dr. Lemus on 08/31/23.      May a detailed message be left on voicemail? YES    Date of last office visit: 02/13/2023    Message routed to: [facility] RN

## 2023-08-01 ENCOUNTER — LAB (OUTPATIENT)
Dept: LAB | Facility: CLINIC | Age: 28
End: 2023-08-01
Payer: MEDICAID

## 2023-08-01 DIAGNOSIS — G40.909 SEIZURE DISORDER (H): ICD-10-CM

## 2023-08-01 LAB
AST SERPL W P-5'-P-CCNC: 21 U/L (ref 0–45)
PLATELET # BLD AUTO: 169 10E3/UL (ref 150–450)
VALPROATE SERPL-MCNC: 101.7 UG/ML

## 2023-08-01 PROCEDURE — 80175 DRUG SCREEN QUAN LAMOTRIGINE: CPT | Mod: 90

## 2023-08-01 PROCEDURE — 80203 DRUG SCREEN QUANT ZONISAMIDE: CPT | Mod: 90

## 2023-08-01 PROCEDURE — 84450 TRANSFERASE (AST) (SGOT): CPT

## 2023-08-01 PROCEDURE — 85049 AUTOMATED PLATELET COUNT: CPT

## 2023-08-01 PROCEDURE — 80164 ASSAY DIPROPYLACETIC ACD TOT: CPT

## 2023-08-01 PROCEDURE — 36415 COLL VENOUS BLD VENIPUNCTURE: CPT

## 2023-08-02 LAB — LAMOTRIGINE SERPL-MCNC: 15.9 UG/ML

## 2023-08-03 ENCOUNTER — TRANSFERRED RECORDS (OUTPATIENT)
Dept: HEALTH INFORMATION MANAGEMENT | Facility: CLINIC | Age: 28
End: 2023-08-03
Payer: MEDICAID

## 2023-08-03 LAB — ZONISAMIDE SERPL-MCNC: 10 UG/ML

## 2023-08-31 ENCOUNTER — TELEPHONE (OUTPATIENT)
Dept: NEUROLOGY | Facility: CLINIC | Age: 28
End: 2023-08-31
Payer: MEDICAID

## 2023-08-31 NOTE — TELEPHONE ENCOUNTER
Hi scheduling team,    VF team was unable to reach patient X 3 for virtual visit today with Dr. Lemus. Please follow up and cancel and/or reschedule appointment at patients convenience.      Dread Juarez, Virtual Facilitator

## 2023-08-31 NOTE — LETTER
8/31/2023       Jeremiah Jolley,    I am reaching out in regards to a virtual appointment that we had scheduled with you for today 8/31/2023 with Dr. Lemus. It looks like we were not able to reach you in order to complete this appointment.     Please call the clinic at [phone] and we can get that appointment with Dr. Lemus rescheduled for you.     Thank you,   Corina  Patient

## 2023-08-31 NOTE — TELEPHONE ENCOUNTER
Received verbal consent from pt's mother/ legal guardian to update pt's general consent virtual for today's appt.

## 2023-09-28 NOTE — TELEPHONE ENCOUNTER
Reached out to schedule follow up visit per Dr. Lemus. No answer, left detailed message for a call back.

## 2024-01-23 DIAGNOSIS — G40.909 SEIZURE DISORDER (H): Primary | ICD-10-CM

## 2024-01-29 RX ORDER — ZONISAMIDE 100 MG/1
CAPSULE ORAL
Qty: 90 CAPSULE | Refills: 1 | Status: SHIPPED | OUTPATIENT
Start: 2024-01-29 | End: 2024-03-18

## 2024-01-29 NOTE — TELEPHONE ENCOUNTER
Last Clinic Visit: [facility] 2/13/23  Filling per [facility] medication refill protocols - seizure medications.  Not all labs required.

## 2024-02-19 ENCOUNTER — TRANSFERRED RECORDS (OUTPATIENT)
Dept: HEALTH INFORMATION MANAGEMENT | Facility: CLINIC | Age: 29
End: 2024-02-19
Payer: MEDICAID

## 2024-02-22 DIAGNOSIS — G40.909 SEIZURE DISORDER (H): ICD-10-CM

## 2024-02-28 RX ORDER — DIVALPROEX SODIUM 500 MG/1
1500 TABLET, EXTENDED RELEASE ORAL 2 TIMES DAILY
Qty: 540 TABLET | Refills: 0 | Status: SHIPPED | OUTPATIENT
Start: 2024-02-28 | End: 2024-03-18

## 2024-02-28 NOTE — TELEPHONE ENCOUNTER
divalproex sodium extended-release (DEPAKOTE ER) 500 MG 24 hr tablet     540 tablet 3 2/13/2023     Last Office Visit : 2-  Future Office visit:  3-    Anti-Seizure Meds Protocol  [id number]/22/2024 10:13 AM   Protocol Details Recent (12 mo) or future (30 days) visit within the authorizing provider's specialty    Review Authorizing provider's last note.    Normal CBC on file in past 26 months    Depakote level within therapeutic range in past 26 months     Labs completed on :  8-1-2023  Component  Ref Range & Units 7 mo ago  (8/1/23) 1 yr ago  (1/10/23)     Valproic acid    ug/mL 101.7 High       Lamotrigine  3.0 - 15.0 ug/mL 15.9     Zonisamide Level Quant  10 - 40 ug/mL 10     AST  0 - 45 U/L 21

## 2024-02-29 DIAGNOSIS — G40.909 SEIZURE DISORDER (H): ICD-10-CM

## 2024-02-29 NOTE — TELEPHONE ENCOUNTER
What is the concern that needs to be addressed by a nurse?     Shola will be out of   divalproex sodium extended-release (DEPAKOTE ER) 500 MG 24 hr tablet,  zonisamide (ZONEGRAN) 100 MG capsule, and lamoTRIgine (LAMICTAL) 100 MG tablet Only has two days left.     May a detailed message be left on voicemail?     Date of last office visit: 8-31-23    Message routed to: [facility] RN Pool

## 2024-03-01 RX ORDER — DIVALPROEX SODIUM 500 MG/1
1500 TABLET, EXTENDED RELEASE ORAL 2 TIMES DAILY
Qty: 540 TABLET | Refills: 0 | OUTPATIENT
Start: 2024-03-01

## 2024-03-01 RX ORDER — ZONISAMIDE 100 MG/1
CAPSULE ORAL
Qty: 90 CAPSULE | Refills: 1 | OUTPATIENT
Start: 2024-03-01

## 2024-03-01 RX ORDER — LAMOTRIGINE 100 MG/1
200 TABLET ORAL 2 TIMES DAILY
Qty: 360 TABLET | Refills: 0 | Status: SHIPPED | OUTPATIENT
Start: 2024-03-01 | End: 2024-03-18

## 2024-03-18 ENCOUNTER — VIRTUAL VISIT (OUTPATIENT)
Dept: NEUROLOGY | Facility: CLINIC | Age: 29
End: 2024-03-18
Payer: MEDICAID

## 2024-03-18 VITALS — BODY MASS INDEX: 33.86 KG/M2 | HEIGHT: 72 IN | WEIGHT: 250 LBS

## 2024-03-18 DIAGNOSIS — G40.909 SEIZURE DISORDER (H): ICD-10-CM

## 2024-03-18 RX ORDER — LAMOTRIGINE 100 MG/1
200 TABLET ORAL 2 TIMES DAILY
Qty: 360 TABLET | Refills: 3 | Status: SHIPPED | OUTPATIENT
Start: 2024-03-18

## 2024-03-18 RX ORDER — ZONISAMIDE 100 MG/1
300 CAPSULE ORAL AT BEDTIME
Qty: 270 CAPSULE | Refills: 3 | Status: SHIPPED | OUTPATIENT
Start: 2024-03-18

## 2024-03-18 RX ORDER — DIVALPROEX SODIUM 500 MG/1
1500 TABLET, EXTENDED RELEASE ORAL 2 TIMES DAILY
Qty: 540 TABLET | Refills: 3 | Status: SHIPPED | OUTPATIENT
Start: 2024-03-18

## 2024-03-18 ASSESSMENT — PAIN SCALES - GENERAL: PAINLEVEL: NO PAIN (0)

## 2024-03-18 NOTE — PROGRESS NOTES
Virtual Visit Details    Type of service:  Video Visit   Video Start Time: 4:26 PM  Video End Time:4:35 PM    Originating Location (pt. Location): Home    Distant Location (provider location):  On-site  Platform used for Video Visit: Su

## 2024-03-18 NOTE — PROGRESS NOTES
CHIEF COMPLAINT: Follow up for seizures.     HISTORY OF PRESENT ILLNESS:  Video call for follow up for seizures. His stepfather was on the conference call. This patient is a 28-year-old, right-handed male with a history of autistic spectrum disorder.  The patient himself cannot provide much history, and the majority of the history is provided by his stepfather.  He was last seen about 14 months ago.  He had no seizures. His last seizure was in 2022. Zonegran was added in 2022, and no seizures were reported after Zonegran was added.  He is currently taking Lamotrigine 200 mg bid and Depakote 1500 - 1500 mg, Zonegran 300 mg at bedtime. His recent EEG showed occasional bifrontal spike and wave activities.    In 2022, he was having almost one GTC every months. He did not miss any meds. He was seen in the ED in Aug and Dec.  Zonegran was added in Dec. 2022. No seizures since Zonegran was added. He is little bit more irritable.      Patient had seizure onset in April 2015, another seizure 10 days after that,  both were described as GTCs.  He was initially started on keppra.  It was stopped because of GI side effects.  Then he was started on Lamotrigine, Depakote was added later.  He is currently on Lamictal 100mg bid and Depakote 500-1000.  He had behavioral problems when he was on Lamictal 150 mg bid.  His behavior has been really good for the past 2 years.  He had a seizure on 4/12/2016 that caused fractures of the right zygomatic arch, maxillary sinus and orbit.        According to the mother, on 04/30/2015, the patient was found at home on the kitchen floor. He was cyanotic. He had foaming at the mouth. He had some bleeding from the nose. He was unresponsive, and he had some tonic-clonic movement of his extremities. The clonic-tonic movement lasted for about 1 minute. The mother did not see the beginning of the event. There was no loss of bowel or bladder control. The patient did not bite his tongue. Afterwards the  patient was very lethargic, still unresponsive until he was in the ambulance; then he was able to recognize his father and was able to communicate a little bit.    The patient had no prior history of seizures. He has been taking Geodon for 7 years. No recent medication changes. No recent illness. When EMS arrived at the scene, the blood sugar was found to be 81.      TRIGGERS FOR SEIZURES: Unclear.    RISK FACTORS FOR SEIZURES: He has a history of autistic spectrum disorder. No history of head trauma with loss of consciousness. No history of CNS infection. No history of febrile convulsions.      Current Outpatient Medications   Medication Sig Dispense Refill    divalproex sodium extended-release (DEPAKOTE ER) 500 MG 24 hr tablet Take 3 tablets (1,500 mg) by mouth 2 times daily Please attend appointment 3/18 for more refills. 540 tablet 0    guanFACINE (TENEX) 1 MG tablet Take 1 mg by mouth 2 times daily      lamoTRIgine (LAMICTAL) 100 MG tablet Take 2 tablets (200 mg) by mouth 2 times daily Please attend appointment 3/18 for more refills. 360 tablet 0    montelukast (SINGULAIR) 10 MG tablet Take 1 tablet (10 mg) by mouth At Bedtime (Patient taking differently: Take 10 mg by mouth nightly as needed) 90 tablet 3    Multiple Vitamin (MULTIVITAMINS PO) Take by mouth every morning      OLANZapine (ZYPREXA) 5 MG tablet Take 5 mg by mouth as needed      ziprasidone (GEODON) 40 MG capsule Take 1 capsule (40 mg) by mouth 2 times daily (with meals) AM (Patient taking differently: Take 40 mg by mouth 2 times daily (with meals) Total 120 mg) 60 capsule 0    ziprasidone (GEODON) 80 MG capsule Take 1 capsule (80 mg) by mouth 2 times daily (with meals) AM AND PM (Patient taking differently: Take 80 mg by mouth 2 times daily (with meals) AM AND PM Total 120 mg) 60 capsule 0    zonisamide (ZONEGRAN) 100 MG capsule Take 3 capsules HS 90 capsule 1         PAST MEDICAL HISTORY: Autistic disorder, sleep disturbances.       FAMILY  HISTORY: No family history of seizures. Grandmother had a history of migraine headaches. Mother had a history of anxiety and panic attacks. Father had a history of chemical dependency.    SOCIAL HISTORY: He is living with his parents. He had special education. He is single. No alcohol, no drug abuse, no smoking. He is not working.     PHYSICAL EXAMINATION:      Alert, oriented to name and place. No facial weakness.     REVIEW OF SYSTEMS: Negative.     PREVIOUS DIAGNOSTIC TESTING: MRI of the brain on 04/30 showed a negative study. EEG on 05/01 showed a normal study.      IMPRESSION:    1. Intractable seizures.      He was last seen about 14 months ago.  He had no seizures. His last seizure was in 2022. Zonegran was added in 2022, and no seizures were reported after Zonegran was added.  He is currently taking Lamotrigine 200 mg bid and Depakote 1500 - 1500 mg, Zonegran 300 mg at bedtime. His recent EEG showed occasional bifrontal spike and wave activities.    2. Autistic disorder.    3. Sleep disturbances.      PLAN:    1. Continue Lamotrigine 200 mg bid, Zonegran 300 mg at bedtime and Depakote 1500 mg bid.  2. Return to clinic in 12 months. If seizures recur, will increase Zonegran. Other options include Vimpat, OXC, etc.      The longitudinal plan of care for seizures was addressed during this visit. Due to the added complexity in care, I will continue to support this patient in the subsequent management of this condition and with the ongoing continuity of care of this condition.         21 min total time was spent on the day of this visit.      9 min was spent on face to face time  12 min was spent on preparation of visit to review charts and labs, ordering medications and tests, and documentation of clinical information

## 2024-03-18 NOTE — PATIENT INSTRUCTIONS
PLAN:    1. Continue Lamotrigine 200 mg bid, Zonegran 300 mg at bedtime and Depakote 1500 mg bid.  2. Return to clinic in 12 months.

## 2024-03-18 NOTE — NURSING NOTE
Pt is unable to complete PHQ-2.     Is the patient currently in the state of [place]? YES    Visit mode:VIDEO    If the visit is dropped, the patient can be reconnected by: VIDEO VISIT: Text to cell phone:   Telephone Information:   Mobile [phone]       Will anyone else be joining the visit? Yes  (If patient encounters technical issues they should call [phone]    How would you like to obtain your AVS? MyChart    Are changes needed to the allergy or medication list? No    Reason for visit: RECHECK    Medications and allergies have been reviewed.      Dread FLORES

## 2024-03-18 NOTE — LETTER
3/18/2024       RE: Shola Owens Jr.  : 1995   MRN: [id number]        Dear Colleague,    Thank you for referring your patient, Shola Owens Jr., to the [facility] at [facility]. Please see a copy of my visit note below.    CHIEF COMPLAINT: Follow up for seizures.     HISTORY OF PRESENT ILLNESS:  Video call for follow up for seizures. His stepfather was on the conference call. This patient is a 28-year-old, right-handed male with a history of autistic spectrum disorder.  The patient himself cannot provide much history, and the majority of the history is provided by his stepfather.  He was last seen about 14 months ago.  He had no seizures. His last seizure was in . Zonegran was added in , and no seizures were reported after Zonegran was added.  He is currently taking Lamotrigine 200 mg bid and Depakote 1500 - 1500 mg, Zonegran 300 mg at bedtime. His recent EEG showed occasional bifrontal spike and wave activities.    In , he was having almost one GTC every months. He did not miss any meds. He was seen in the ED in Aug and Dec.  Zonegran was added in Dec. 2022. No seizures since Zonegran was added. He is little bit more irritable.      Patient had seizure onset in 2015, another seizure 10 days after that,  both were described as GTCs.  He was initially started on keppra.  It was stopped because of GI side effects.  Then he was started on Lamotrigine, Depakote was added later.  He is currently on Lamictal 100mg bid and Depakote 500-1000.  He had behavioral problems when he was on Lamictal 150 mg bid.  His behavior has been really good for the past 2 years.  He had a seizure on 2016 that caused fractures of the right zygomatic arch, maxillary sinus and orbit.        According to the mother, on 2015, the patient was found at home on the kitchen floor. He was cyanotic. He had foaming at the mouth. He had some  bleeding from the nose. He was unresponsive, and he had some tonic-clonic movement of his extremities. The clonic-tonic movement lasted for about 1 minute. The mother did not see the beginning of the event. There was no loss of bowel or bladder control. The patient did not bite his tongue. Afterwards the patient was very lethargic, still unresponsive until he was in the ambulance; then he was able to recognize his father and was able to communicate a little bit.    The patient had no prior history of seizures. He has been taking Geodon for 7 years. No recent medication changes. No recent illness. When EMS arrived at the scene, the blood sugar was found to be 81.      TRIGGERS FOR SEIZURES: Unclear.    RISK FACTORS FOR SEIZURES: He has a history of autistic spectrum disorder. No history of head trauma with loss of consciousness. No history of CNS infection. No history of febrile convulsions.      Current Outpatient Medications   Medication Sig Dispense Refill    divalproex sodium extended-release (DEPAKOTE ER) 500 MG 24 hr tablet Take 3 tablets (1,500 mg) by mouth 2 times daily Please attend appointment 3/18 for more refills. 540 tablet 0    guanFACINE (TENEX) 1 MG tablet Take 1 mg by mouth 2 times daily      lamoTRIgine (LAMICTAL) 100 MG tablet Take 2 tablets (200 mg) by mouth 2 times daily Please attend appointment 3/18 for more refills. 360 tablet 0    montelukast (SINGULAIR) 10 MG tablet Take 1 tablet (10 mg) by mouth At Bedtime (Patient taking differently: Take 10 mg by mouth nightly as needed) 90 tablet 3    Multiple Vitamin (MULTIVITAMINS PO) Take by mouth every morning      OLANZapine (ZYPREXA) 5 MG tablet Take 5 mg by mouth as needed      ziprasidone (GEODON) 40 MG capsule Take 1 capsule (40 mg) by mouth 2 times daily (with meals) AM (Patient taking differently: Take 40 mg by mouth 2 times daily (with meals) Total 120 mg) 60 capsule 0    ziprasidone (GEODON) 80 MG capsule Take 1 capsule (80 mg) by mouth 2  times daily (with meals) AM AND PM (Patient taking differently: Take 80 mg by mouth 2 times daily (with meals) AM AND PM Total 120 mg) 60 capsule 0    zonisamide (ZONEGRAN) 100 MG capsule Take 3 capsules HS 90 capsule 1         PAST MEDICAL HISTORY: Autistic disorder, sleep disturbances.       FAMILY HISTORY: No family history of seizures. Grandmother had a history of migraine headaches. Mother had a history of anxiety and panic attacks. Father had a history of chemical dependency.    SOCIAL HISTORY: He is living with his parents. He had special education. He is single. No alcohol, no drug abuse, no smoking. He is not working.     PHYSICAL EXAMINATION:      Alert, oriented to name and place. No facial weakness.     REVIEW OF SYSTEMS: Negative.     PREVIOUS DIAGNOSTIC TESTING: MRI of the brain on 04/30 showed a negative study. EEG on 05/01 showed a normal study.      IMPRESSION:    1. Intractable seizures.      He was last seen about 14 months ago.  He had no seizures. His last seizure was in 2022. Zonegran was added in 2022, and no seizures were reported after Zonegran was added.  He is currently taking Lamotrigine 200 mg bid and Depakote 1500 - 1500 mg, Zonegran 300 mg at bedtime. His recent EEG showed occasional bifrontal spike and wave activities.    2. Autistic disorder.    3. Sleep disturbances.      PLAN:    1. Continue Lamotrigine 200 mg bid, Zonegran 300 mg at bedtime and Depakote 1500 mg bid.  2. Return to clinic in 12 months. If seizures recur, will increase Zonegran. Other options include Vimpat, OXC, etc.      The longitudinal plan of care for seizures was addressed during this visit. Due to the added complexity in care, I will continue to support this patient in the subsequent management of this condition and with the ongoing continuity of care of this condition.       21 min total time was spent on the day of this visit.      9 min was spent on face to face time  12 min was spent on preparation of  visit to review charts and labs, ordering medications and tests, and documentation of clinical information        Again, thank you for allowing me to participate in the care of your patient.      Sincerely,    Austen Lemus MD

## 2024-08-12 ENCOUNTER — TELEPHONE (OUTPATIENT)
Dept: FAMILY MEDICINE | Facility: CLINIC | Age: 29
End: 2024-08-12
Payer: MEDICAID

## 2024-08-12 NOTE — TELEPHONE ENCOUNTER
Received paperwork from [facility] asking for ICD-10 codes and annual doctors notes, and providers signatures.  Left message for  Fina Lovett, [phone] that patient hasn't been seen for a physical since 2021.  ASked her to call us back and let us know how to proceed with paperwork.  Paperwork in [facility] pending box .    Sarina Boudreaux on 8/12/2024 at 9:02 AM

## 2024-08-13 ENCOUNTER — LAB (OUTPATIENT)
Dept: LAB | Facility: CLINIC | Age: 29
End: 2024-08-13
Payer: MEDICAID

## 2024-08-13 DIAGNOSIS — Z79.899 ENCOUNTER FOR LONG-TERM (CURRENT) USE OF MEDICATIONS: ICD-10-CM

## 2024-08-13 DIAGNOSIS — Z79.899 ENCOUNTER FOR LONG-TERM (CURRENT) USE OF MEDICATIONS: Primary | ICD-10-CM

## 2024-08-13 LAB
ALBUMIN SERPL BCG-MCNC: 4.3 G/DL (ref 3.5–5.2)
ALP SERPL-CCNC: 61 U/L (ref 40–150)
ALT SERPL W P-5'-P-CCNC: 15 U/L (ref 0–70)
ANION GAP SERPL CALCULATED.3IONS-SCNC: 9 MMOL/L (ref 7–15)
AST SERPL W P-5'-P-CCNC: 23 U/L (ref 0–45)
BASOPHILS # BLD AUTO: 0.1 10E3/UL (ref 0–0.2)
BASOPHILS NFR BLD AUTO: 1 %
BILIRUB SERPL-MCNC: 0.3 MG/DL
BUN SERPL-MCNC: 13.8 MG/DL (ref 6–20)
CALCIUM SERPL-MCNC: 9.5 MG/DL (ref 8.8–10.4)
CHLORIDE SERPL-SCNC: 103 MMOL/L (ref 98–107)
CHOLEST SERPL-MCNC: 178 MG/DL
CREAT SERPL-MCNC: 1.29 MG/DL (ref 0.67–1.17)
EGFRCR SERPLBLD CKD-EPI 2021: 76 ML/MIN/1.73M2
EOSINOPHIL # BLD AUTO: 0.1 10E3/UL (ref 0–0.7)
EOSINOPHIL NFR BLD AUTO: 1 %
ERYTHROCYTE [DISTWIDTH] IN BLOOD BY AUTOMATED COUNT: 13 % (ref 10–15)
FASTING STATUS PATIENT QL REPORTED: YES
GLUCOSE SERPL-MCNC: 84 MG/DL (ref 70–99)
GLUCOSE SERPL-MCNC: 84 MG/DL (ref 70–99)
HBA1C MFR BLD: 5 % (ref 0–5.6)
HCO3 SERPL-SCNC: 25 MMOL/L (ref 22–29)
HCT VFR BLD AUTO: 46.8 % (ref 40–53)
HDLC SERPL-MCNC: 41 MG/DL
HGB BLD-MCNC: 15.1 G/DL (ref 13.3–17.7)
IMM GRANULOCYTES # BLD: 0.1 10E3/UL
IMM GRANULOCYTES NFR BLD: 1 %
LDLC SERPL CALC-MCNC: 111 MG/DL
LYMPHOCYTES # BLD AUTO: 2.4 10E3/UL (ref 0.8–5.3)
LYMPHOCYTES NFR BLD AUTO: 40 %
MCH RBC QN AUTO: 31.8 PG (ref 26.5–33)
MCHC RBC AUTO-ENTMCNC: 32.3 G/DL (ref 31.5–36.5)
MCV RBC AUTO: 99 FL (ref 78–100)
MONOCYTES # BLD AUTO: 0.6 10E3/UL (ref 0–1.3)
MONOCYTES NFR BLD AUTO: 10 %
NEUTROPHILS # BLD AUTO: 2.8 10E3/UL (ref 1.6–8.3)
NEUTROPHILS NFR BLD AUTO: 47 %
NONHDLC SERPL-MCNC: 137 MG/DL
PLATELET # BLD AUTO: 144 10E3/UL (ref 150–450)
POTASSIUM SERPL-SCNC: 4.6 MMOL/L (ref 3.4–5.3)
PROT SERPL-MCNC: 7.2 G/DL (ref 6.4–8.3)
RBC # BLD AUTO: 4.75 10E6/UL (ref 4.4–5.9)
SODIUM SERPL-SCNC: 137 MMOL/L (ref 135–145)
T4 FREE SERPL-MCNC: 0.95 NG/DL (ref 0.9–1.7)
TRIGL SERPL-MCNC: 131 MG/DL
TSH SERPL DL<=0.005 MIU/L-ACNC: 4.98 UIU/ML (ref 0.3–4.2)
WBC # BLD AUTO: 5.9 10E3/UL (ref 4–11)

## 2024-08-13 PROCEDURE — 84443 ASSAY THYROID STIM HORMONE: CPT

## 2024-08-13 PROCEDURE — 80061 LIPID PANEL: CPT

## 2024-08-13 PROCEDURE — 83036 HEMOGLOBIN GLYCOSYLATED A1C: CPT

## 2024-08-13 PROCEDURE — 85025 COMPLETE CBC W/AUTO DIFF WBC: CPT

## 2024-08-13 PROCEDURE — 36415 COLL VENOUS BLD VENIPUNCTURE: CPT

## 2024-08-13 PROCEDURE — 80053 COMPREHEN METABOLIC PANEL: CPT

## 2024-08-13 PROCEDURE — 84439 ASSAY OF FREE THYROXINE: CPT

## 2024-08-19 ENCOUNTER — TRANSFERRED RECORDS (OUTPATIENT)
Dept: HEALTH INFORMATION MANAGEMENT | Facility: CLINIC | Age: 29
End: 2024-08-19
Payer: MEDICAID

## 2024-08-26 ENCOUNTER — OFFICE VISIT (OUTPATIENT)
Dept: FAMILY MEDICINE | Facility: CLINIC | Age: 29
End: 2024-08-26
Payer: MEDICAID

## 2024-08-26 VITALS
HEART RATE: 71 BPM | HEIGHT: 72 IN | SYSTOLIC BLOOD PRESSURE: 126 MMHG | OXYGEN SATURATION: 99 % | WEIGHT: 248.2 LBS | TEMPERATURE: 98.1 F | RESPIRATION RATE: 20 BRPM | BODY MASS INDEX: 33.62 KG/M2 | DIASTOLIC BLOOD PRESSURE: 80 MMHG

## 2024-08-26 DIAGNOSIS — H04.201: ICD-10-CM

## 2024-08-26 DIAGNOSIS — T88.7XXA MEDICATION SIDE EFFECTS: Primary | ICD-10-CM

## 2024-08-26 DIAGNOSIS — R06.7: ICD-10-CM

## 2024-08-26 DIAGNOSIS — Z23 IMMUNIZATION DUE: ICD-10-CM

## 2024-08-26 PROCEDURE — 90746 HEPB VACCINE 3 DOSE ADULT IM: CPT

## 2024-08-26 PROCEDURE — 93000 ELECTROCARDIOGRAM COMPLETE: CPT

## 2024-08-26 PROCEDURE — 99213 OFFICE O/P EST LOW 20 MIN: CPT | Mod: 25

## 2024-08-26 PROCEDURE — 90471 IMMUNIZATION ADMIN: CPT

## 2024-08-26 RX ORDER — FLUTICASONE PROPIONATE 50 MCG
1 SPRAY, SUSPENSION (ML) NASAL DAILY
Qty: 16 G | Refills: 1 | Status: SHIPPED | OUTPATIENT
Start: 2024-08-26

## 2024-08-26 ASSESSMENT — PAIN SCALES - GENERAL: PAINLEVEL: NO PAIN (0)

## 2024-08-26 NOTE — NURSING NOTE
Immunizations Administered       Name Date Dose VIS Date Route    Hepatitis B, Adult 8/26/24  3:40 PM 1 mL 05/12/2023, Given Today Intramuscular          Prior to immunization administration, verified patients identity using patient s name and date of birth. Please see Immunization Activity for additional information.     Screening Questionnaire for Adult Immunization    Are you sick today?   No   Do you have allergies to medications, food, a vaccine component or latex?   Yes   Have you ever had a serious reaction after receiving a vaccination?   No   Do you have a long-term health problem with heart, lung, kidney, or metabolic disease (e.g., diabetes), asthma, a blood disorder, no spleen, complement component deficiency, a cochlear implant, or a spinal fluid leak?  Are you on long-term aspirin therapy?   No   Do you have cancer, leukemia, HIV/AIDS, or any other immune system problem?   No   Do you have a parent, brother, or sister with an immune system problem?   No   In the past 3 months, have you taken medications that affect  your immune system, such as prednisone, other steroids, or anticancer drugs; drugs for the treatment of rheumatoid arthritis, Crohn s disease, or psoriasis; or have you had radiation treatments?   No   Have you had a seizure, or a brain or other nervous system problem?   Yes   During the past year, have you received a transfusion of blood or blood    products, or been given immune (gamma) globulin or antiviral drug?   No   For women: Are you pregnant or is there a chance you could become       pregnant during the next month?   No   Have you received any vaccinations in the past 4 weeks?   No     Immunization questionnaire was positive for at least one answer.  Notified Yuly Harris CNP who advised to administer vaccine.      Patient instructed to remain in clinic for 15 minutes afterwards, and to report any adverse reactions.     Screening performed by Rhonda SCOTT LPN on 8/26/2024 at 3:52  PM.

## 2024-08-26 NOTE — RESULT ENCOUNTER NOTE
Can someone please call the Mother (Caretaker) as they do not have MyChart set up yet. Thank you    Arik Jolley and Chris,    Your EKG indicated no cardiac concerns at this time. You can continue to take ziprasidone (Geodon) as prescribed. Next follow up recommended within 6 months for annual wellness exam.     With Kind Regards,    Yuly Harris, CNP

## 2024-08-26 NOTE — PROGRESS NOTES
"  Assessment & Plan     Medication side effects  Patient is on ziprasidone (Geodon), which can have QT prolongation. Patient had EKG completed and no indication of cardiac concerns or side effects form medication.     - EKG 12-lead complete w/read - Clinics    Sneezing with right watery eye  Patient had a wisdom tooth extracted two months ago and right eye has had increased tearing since. Patient denies any vision changes or injuries. Sclera is without erythema or swelling. Patient has pilonidal disease and has difficulty verbalizing answers to questions. Patient was accompanied by mother and caretaker. Patient also has seasonal allergies. Will try Flonase for possible sinus clearing to help improve eye tearing. Patient was encouraged to be seen for annual wellness in the fall and right eye can be reevaluated.    - fluticasone (FLONASE) 50 MCG/ACT nasal spray; Spray 1 spray into both nostrils daily.    Immunization due    - HEPATITIS B VACCINE (20 YEARS AND OLDER) (ENGERIX-B/RECOMBIVAX)          BMI  Estimated body mass index is 33.36 kg/m  as calculated from the following:    Height as of this encounter: 1.837 m (6' 0.32\").    Weight as of this encounter: 112.6 kg (248 lb 3.2 oz).   Weight management plan: Discussed healthy diet and exercise guidelines      Yvon Jolley is a 29 year old, presenting for the following health issues:  Medication Therapy Management (Would like to get an EKG per psychologist recommendation/Fax [phone] ATTN loulou alvarez)        8/26/2024     2:27 PM   Additional Questions   Roomed by Rhonda SCOTT LPN   Accompanied by Guardian         8/26/2024     2:27 PM   Patient Reported Additional Medications   Patient reports taking the following new medications no new meds     History of Present Illness       Reason for visit:  Ekg requested by phycologist   He is taking medications regularly.         Review of Systems  CONSTITUTIONAL: NEGATIVE for fever, chills, change in " "weight  ENT/MOUTH: NEGATIVE for ear, mouth and throat problems  RESP: NEGATIVE for significant cough or SOB  CV: NEGATIVE for chest pain, palpitations or peripheral edema      Objective    /80 (BP Location: Right arm, Patient Position: Sitting, Cuff Size: Adult Regular)   Pulse 71   Temp 98.1  F (36.7  C) (Tympanic)   Resp 20   Ht 1.837 m (6' 0.32\")   Wt 112.6 kg (248 lb 3.2 oz)   SpO2 99%   BMI 33.36 kg/m    Body mass index is 33.36 kg/m .  Physical Exam   GENERAL: alert and no distress  NECK: no adenopathy, no asymmetry, masses, or scars  RESP: lungs clear to auscultation - no rales, rhonchi or wheezes  CV: regular rate and rhythm, normal S1 S2, no S3 or S4, no murmur, click or rub, no peripheral edema  MS: no gross musculoskeletal defects noted, no edema    EKG - Reviewed and interpreted by me appears normal, NSR, normal axis, normal intervals, no acute ST/T changes c/w ischemia, no LVH by voltage criteria, unchanged from previous tracings        Signed Electronically by: DEYSI Menhcaca CNP    "

## 2024-09-15 ENCOUNTER — HOSPITAL ENCOUNTER (EMERGENCY)
Facility: CLINIC | Age: 29
Discharge: HOME | End: 2024-09-15
Payer: MEDICAID

## 2024-09-15 VITALS
TEMPERATURE: 98.3 F | RESPIRATION RATE: 18 BRPM | DIASTOLIC BLOOD PRESSURE: 80 MMHG | OXYGEN SATURATION: 97 % | HEART RATE: 75 BPM | SYSTOLIC BLOOD PRESSURE: 140 MMHG

## 2024-09-15 DIAGNOSIS — L03.119 CELLULITIS OF LOWER EXTREMITY, UNSPECIFIED LATERALITY: ICD-10-CM

## 2024-09-15 LAB
MRSA DNA SPEC QL NAA+PROBE: NEGATIVE
SA TARGET DNA: POSITIVE

## 2024-09-15 PROCEDURE — G0463 HOSPITAL OUTPT CLINIC VISIT: HCPCS

## 2024-09-15 PROCEDURE — 87641 MR-STAPH DNA AMP PROBE: CPT

## 2024-09-15 PROCEDURE — 87640 STAPH A DNA AMP PROBE: CPT | Mod: XU

## 2024-09-15 PROCEDURE — 99213 OFFICE O/P EST LOW 20 MIN: CPT

## 2024-09-15 RX ORDER — MUPIROCIN 20 MG/G
OINTMENT TOPICAL 3 TIMES DAILY
Qty: 15 G | Refills: 0 | Status: SHIPPED | OUTPATIENT
Start: 2024-09-15

## 2024-09-15 RX ORDER — CEPHALEXIN 500 MG/1
500 CAPSULE ORAL 4 TIMES DAILY
Qty: 28 CAPSULE | Refills: 0 | Status: SHIPPED | OUTPATIENT
Start: 2024-09-15 | End: 2024-09-22

## 2024-09-15 ASSESSMENT — COLUMBIA-SUICIDE SEVERITY RATING SCALE - C-SSRS
1. IN THE PAST MONTH, HAVE YOU WISHED YOU WERE DEAD OR WISHED YOU COULD GO TO SLEEP AND NOT WAKE UP?: NO
6. HAVE YOU EVER DONE ANYTHING, STARTED TO DO ANYTHING, OR PREPARED TO DO ANYTHING TO END YOUR LIFE?: NO
2. HAVE YOU ACTUALLY HAD ANY THOUGHTS OF KILLING YOURSELF IN THE PAST MONTH?: NO

## 2024-09-15 ASSESSMENT — ACTIVITIES OF DAILY LIVING (ADL): ADLS_ACUITY_SCORE: 36

## 2024-09-15 NOTE — ED PROVIDER NOTES
History     Chief Complaint   Patient presents with    Derm Problem     Has autism and is a . Has a lot of open sores on both legs. Mom has tried cleaning them and using wound care stuff to try and take care of it and keep covered and they are looking infected.      HPI  Shola Owens Jr. is a 29 year old male who presents with mother for evaluation of sores to the BLE ongoing for the past week.  Has history of autism and frequently picks at his legs anytime he notices a change (new scar, bug bite etc.), this is usual for him and mother will do wound care by cleansing the area and applying antibiotic ointment which usually does a job.  However, recently she has noticed that he is picking more frequently and she thinks that the sores have gotten infected, she has noticed increased redness and purulent drainage from the sores and he continues to pick at them.  He does not complain of any pain.  She has not noticed any fevers, vomiting, nausea or any associated symptoms.    Allergies:  Allergies   Allergen Reactions    Risperidone Hives       Problem List:    Patient Active Problem List    Diagnosis Date Noted    Immunization due 08/26/2024     Priority: Medium    Medication side effects 08/26/2024     Priority: Medium    Sneezing with right watery eye 08/26/2024     Priority: Medium    Pilonidal disease 10/25/2022     Priority: Medium     Added automatically from request for surgery [id number]      Closed fracture of maxilla (H) 04/18/2016     Priority: Medium    Seizure 04/30/2015     Priority: Medium    Disturbance in sleep behavior 08/31/2006     Priority: Medium     Doing well on Geodon managed by Dr. Sanders.  05/2013:  Geodon 120 mg BID.  Problem list name updated by automated process. Provider to review      Active autistic disorder 06/13/2005     Priority: Medium     Moderate to severe.  On Tenex and Cymbalta for anxiety.  Meds [facility] Dr. Sanders.  [facility]  05/2013:  Tenex 1 mg BID.  Started Zyprexa PRN for some increased aggression.  Reflux symptoms have been episodic since 11/2011. Associated with the size of the meals. Portion control is effective.   Problem list name updated by automated process. Provider to review          Past Medical History:    Past Medical History:   Diagnosis Date    Acne     Autistic disorder, current or active state     Mild persistent asthma     Pilonidal disease     Seasonal allergic rhinitis     Seizure disorder (H)        Past Surgical History:    Past Surgical History:   Procedure Laterality Date    CYSTECTOMY PILONIDAL N/A 1/24/2023    Procedure: ANUS, LAY OPEN PILONIDAL CYSTECTOMY;  Surgeon: Carl Helm MD;  Location: [facility] OR    EXAM UNDER ANESTHESIA ANUS N/A 1/24/2023    Procedure: EXAM UNDER ANESTHESIA, WOUND VAC PLACEMENT;  Surgeon: Carl Helm MD;  Location: [facility] OR    MOUTH SURGERY      [place] teeth removed         Family History:    Family History   Problem Relation Age of Onset    Allergies Mother         seasonal    Hypothyroidism Mother     Hypertension Father     Post Operative Nausea and Vomiting Sister     Hypothyroidism Sister     Heart Disease Maternal Grandmother         A. Fib    Thyroid Disease Maternal Grandmother         hypo    Post Operative Nausea and Vomiting Maternal Grandmother     Cancer Maternal Grandfather         cancerous tumor in his arm    Hypertension Paternal Grandmother     Thyroid Disease Paternal Grandmother         hypo    Cancer Paternal Grandfather         passed from lung cancer    Hypertension Maternal Uncle     Post Operative Nausea and Vomiting Other         maternal great uncle    Clotting Disorder No family hx of        Social History:  Marital Status:  Single [1]  Social History     Tobacco Use    Smoking status: Never     Passive exposure: Never    Smokeless tobacco: Never    Tobacco comments:     outside smokers-not in the house   Vaping Use    Vaping status: Never Used    Substance Use Topics    Alcohol use: No     Alcohol/week: 0.0 standard drinks of alcohol    Drug use: No        Medications:    cephALEXin (KEFLEX) 500 MG capsule  mupirocin (BACTROBAN) 2 % external ointment  divalproex sodium extended-release (DEPAKOTE ER) 500 MG 24 hr tablet  fluticasone (FLONASE) 50 MCG/ACT nasal spray  guanFACINE (TENEX) 1 MG tablet  lamoTRIgine (LAMICTAL) 100 MG tablet  montelukast (SINGULAIR) 10 MG tablet  Multiple Vitamin (MULTIVITAMINS PO)  OLANZapine (ZYPREXA) 5 MG tablet  ziprasidone (GEODON) 40 MG capsule  ziprasidone (GEODON) 80 MG capsule  zonisamide (ZONEGRAN) 100 MG capsule          Review of Systems  Pertinent review of systems as documented per HPI above.    Physical Exam   BP: (!) 140/80  Pulse: 75  Temp: 98.3  F (36.8  C)  Resp: 18  SpO2: 97 %      Physical Exam  Vitals and nursing note reviewed.   Constitutional:       General: He is not in acute distress.     Appearance: Normal appearance. He is not ill-appearing, toxic-appearing or diaphoretic.   HENT:      Head: Atraumatic.   Cardiovascular:      Rate and Rhythm: Normal rate.   Pulmonary:      Effort: Pulmonary effort is normal. No respiratory distress.   Musculoskeletal:         General: Normal range of motion.      Right lower leg: No edema.      Left lower leg: No edema.   Skin:     General: Skin is warm and dry.      Comments: Several open wounds to BLE, some with purulent drainage and erythema. There is lymphangitic streaking at left LE.    Neurological:      Mental Status: He is alert. Mental status is at baseline.   Psychiatric:         Mood and Affect: Mood normal.         Behavior: Behavior normal.         ED Course       Results for orders placed or performed during the hospital encounter of 09/15/24 (from the past 24 hour(s))   MRSA MSSA PCR, Nasal Swab    Specimen: Nare, Left; Swab   Result Value Ref Range    MRSA Target DNA Negative Negative    SA Target DNA Positive     Narrative    The [employer]  Xpert SA Nasal  Complete assay performed in the Gene[employer]pert  Dx System is a qualitative in vitro diagnostic test designed for rapid detection of Staphylococcus aureus (SA) and methicillin-resistant Staphylococcus aureus (MRSA) from nasal swabs in patients at risk for nasal colonization. The test utilizes automated real-time polymerase chain reaction (PCR) to detect MRSA/SA DNA. The Xpert SA Nasal Complete assay is intended to aid in the prevention and control of MRSA/SA infections in healthcare settings. The assay is not intended to diagnose, guide or monitor treatment for MRSA/SA infections, or provide results of susceptibility to methicillin. A negative result does not preclude MRSA/SA nasal colonization.        Medications - No data to display    Assessments & Plan (with Medical Decision Making)     I have reviewed the nursing notes.    I have reviewed the findings, diagnosis, plan and need for follow up with the patient.  29-year-old male presents with mother for evaluation of sores to the BLE ongoing for the past week.  Has history of autism and frequently picks at his legs anytime he notices a change (new scar, bug bite etc.), this is usual for him and mother will do wound care by cleansing the area and applying antibiotic ointment which usually does a job.  However, recently she has noticed that he is picking more frequently and she thinks that the sores have gotten infected, she has noticed increased redness and purulent drainage from the sores and he continues to pick at them.  He does not complain of any pain.  She has not noticed any fevers, vomiting, nausea or any associated symptoms.     Patient afebrile on arrival with VS WNL.  There are several open wounds to BLE, some with purulent drainage and surrounding erythema.  There is lymphangitic streaking along the L LE.  MRSA swab obtained and negative.  Concern for cellulitis, will treat with Keflex, discussed usage and potential adverse effects.  Mupirocin ointment given  for purulent wounds.  Advised mother to continue with wound cares, discussed ways to potentially mitigate the skin picking such as trying to wear gloves or keeping the wounds wrapped for extended periods of time.  The wounds were cleansed here, bacitracin and gauze applied, wrapped with Kerlix.  Advised mother that if any worsening symptoms develop such as fevers, continued spread of erythema, or other concerns then she should bring him back for further evaluation.  All questions answered.  Patient's mother verbalizes understanding and agreement with the above plan.    Disclaimer: This note consists of symbols derived from keyboarding, dictation, and/or voice recognition software. As a result, there may be errors in the script that have gone undetected.  Please consider this when interpreting information found in the chart.      Discharge Medication List as of 9/15/2024 11:39 AM        START taking these medications    Details   cephALEXin (KEFLEX) 500 MG capsule Take 1 capsule (500 mg) by mouth 4 times daily for 7 days., Disp-28 capsule, R-0, E-Prescribe      mupirocin (BACTROBAN) 2 % external ointment Apply topically 3 times daily.Disp-15 g, [id number]             Final diagnoses:   Cellulitis of lower extremity, unspecified laterality       9/15/2024   [facility] EMERGENCY DEPT       Trena Olmedo PA-C  09/15/24 1639

## 2024-09-15 NOTE — DISCHARGE INSTRUCTIONS
-Take antibiotic 4 times a day for the next week.  -Apply antibiotic ointment to the open sores.  -You will be contacted if MRSA swab is positive and antibiotic will be switched.  -If he develops any worse symptoms like fevers, streaking redness, or any acute change in behavior then please return for recheck.

## 2025-02-21 ENCOUNTER — TELEPHONE (OUTPATIENT)
Dept: NEUROLOGY | Facility: CLINIC | Age: 30
End: 2025-02-21

## 2025-02-21 DIAGNOSIS — G40.909 SEIZURE DISORDER (H): ICD-10-CM

## 2025-02-24 ENCOUNTER — TELEPHONE (OUTPATIENT)
Dept: URGENT CARE | Facility: URGENT CARE | Age: 30
End: 2025-02-24
Payer: MEDICAID

## 2025-02-24 ENCOUNTER — TELEPHONE (OUTPATIENT)
Dept: FAMILY MEDICINE | Facility: CLINIC | Age: 30
End: 2025-02-24
Payer: MEDICAID

## 2025-02-24 DIAGNOSIS — Z20.818 PERTUSSIS EXPOSURE: Primary | ICD-10-CM

## 2025-02-24 RX ORDER — AZITHROMYCIN 250 MG/1
TABLET, FILM COATED ORAL
Qty: 6 TABLET | Refills: 0 | Status: SHIPPED | OUTPATIENT
Start: 2025-02-24

## 2025-02-24 NOTE — TELEPHONE ENCOUNTER
[facility] Call Center    Phone Message    May a detailed message be left on voicemail: no     Reason for Call: Medication Refill Request    Has the patient contacted the pharmacy for the refill? Yes     Name of medication being requested:   divalproex sodium extended-release (DEPAKOTE ER) 500 MG 24 hr tablet    lamoTRIgine (LAMICTAL) 100 MG tablet    zonisamide (ZONEGRAN) 100 MG capsule      Provider who prescribed the medication: Austen Lemus    Pharmacy:   [address]       Date medication is needed: 02/28      Action Taken: Other: Neurology    Travel Screening: Not Applicable

## 2025-02-24 NOTE — TELEPHONE ENCOUNTER
Patient's nephew, whom is watched where he lives, tested positive for Pertussis. Would like medication sent to [facility].    Emani Chaudhary LPN

## 2025-02-24 NOTE — TELEPHONE ENCOUNTER
General Call      Reason for Call:  pertussis    What are your questions or concerns:  Mom calling - stated that a relative that they are around everyday just tested positive for pertussis and pt has also been coughing. Mom wondering if something can be prescribed for pt.      Okay to leave a detailed message?: Yes at Cell number on file:    Telephone Information:   Mobile [phone]     Kinza Tarango Patient

## 2025-02-26 RX ORDER — DIVALPROEX SODIUM 500 MG/1
1500 TABLET, FILM COATED, EXTENDED RELEASE ORAL 2 TIMES DAILY
Qty: 540 TABLET | Refills: 1 | Status: SHIPPED | OUTPATIENT
Start: 2025-02-26

## 2025-02-26 RX ORDER — ZONISAMIDE 100 MG/1
300 CAPSULE ORAL AT BEDTIME
Qty: 270 CAPSULE | Refills: 1 | Status: SHIPPED | OUTPATIENT
Start: 2025-02-26

## 2025-02-26 RX ORDER — LAMOTRIGINE 100 MG/1
200 TABLET ORAL 2 TIMES DAILY
Qty: 360 TABLET | Refills: 1 | Status: SHIPPED | OUTPATIENT
Start: 2025-02-26

## 2025-02-26 NOTE — TELEPHONE ENCOUNTER
"Spoke to Mom, Chris.     \"My Grandson who is here with us all the time had Pertussis and we have already gotten medication for Shola-he has been taken care of..\"     Looks like  Provider sent order to pharmacy on 2-24-25.     Sharla Benito RN    "

## 2025-03-20 ENCOUNTER — VIRTUAL VISIT (OUTPATIENT)
Dept: NEUROLOGY | Facility: CLINIC | Age: 30
End: 2025-03-20
Payer: MEDICAID

## 2025-03-20 DIAGNOSIS — G40.909 SEIZURE DISORDER (H): ICD-10-CM

## 2025-03-20 RX ORDER — DIVALPROEX SODIUM 500 MG/1
1500 TABLET, FILM COATED, EXTENDED RELEASE ORAL 2 TIMES DAILY
Qty: 540 TABLET | Refills: 3 | Status: SHIPPED | OUTPATIENT
Start: 2025-03-20

## 2025-03-20 RX ORDER — ZONISAMIDE 100 MG/1
300 CAPSULE ORAL AT BEDTIME
Qty: 270 CAPSULE | Refills: 3 | Status: SHIPPED | OUTPATIENT
Start: 2025-03-20

## 2025-03-20 RX ORDER — LAMOTRIGINE 100 MG/1
200 TABLET ORAL 2 TIMES DAILY
Qty: 360 TABLET | Refills: 3 | Status: SHIPPED | OUTPATIENT
Start: 2025-03-20

## 2025-03-20 NOTE — PROGRESS NOTES
CHIEF COMPLAINT: Follow up for seizures.     HISTORY OF PRESENT ILLNESS:  Video call for follow up for seizures. His stepfather was on the conference call. This patient is a 29-year-old, right-handed male with a history of autistic spectrum disorder.  The patient himself cannot provide much history, and the majority of the history is provided by his stepfather.  He was last seen about 12 months ago.  He had no seizures since the last visit. His last seizure was in 2022. Zonegran was added in 2022, and no seizures were reported after Zonegran was added.  He is currently taking Lamotrigine 200 mg bid and Depakote 1500 - 1500 mg, Zonegran 300 mg at bedtime. His recent EEG showed occasional bifrontal spike and wave activities.    In 2022, he was having almost one GTC every months. He did not miss any meds. He was seen in the ED in Aug and Dec.  Zonegran was added in Dec. 2022. No seizures since Zonegran was added. He is little bit more irritable.      Patient had seizure onset in April 2015, another seizure 10 days after that,  both were described as GTCs.  He was initially started on keppra.  It was stopped because of GI side effects.  Then he was started on Lamotrigine, Depakote was added later.  He is currently on Lamictal 100mg bid and Depakote 500-1000.  He had behavioral problems when he was on Lamictal 150 mg bid.  His behavior has been really good for the past 2 years.  He had a seizure on 4/12/2016 that caused fractures of the right zygomatic arch, maxillary sinus and orbit.        According to the mother, on 04/30/2015, the patient was found at home on the kitchen floor. He was cyanotic. He had foaming at the mouth. He had some bleeding from the nose. He was unresponsive, and he had some tonic-clonic movement of his extremities. The clonic-tonic movement lasted for about 1 minute. The mother did not see the beginning of the event. There was no loss of bowel or bladder control. The patient did not bite his  tongue. Afterwards the patient was very lethargic, still unresponsive until he was in the ambulance; then he was able to recognize his father and was able to communicate a little bit.    The patient had no prior history of seizures. He has been taking Geodon for 7 years. No recent medication changes. No recent illness. When EMS arrived at the scene, the blood sugar was found to be 81.      TRIGGERS FOR SEIZURES: Unclear.    RISK FACTORS FOR SEIZURES: He has a history of autistic spectrum disorder. No history of head trauma with loss of consciousness. No history of CNS infection. No history of febrile convulsions.      Current Outpatient Medications   Medication Sig Dispense Refill    azithromycin (ZITHROMAX) 250 MG tablet Two tablets first day, then one tablet daily for four days. 6 tablet 0    divalproex sodium extended-release (DEPAKOTE ER) 500 MG 24 hr tablet TAKE THREE TABLETS BY MOUTH TWICE A  tablet 1    fluticasone (FLONASE) 50 MCG/ACT nasal spray Spray 1 spray into both nostrils daily. 16 g 1    guanFACINE (TENEX) 1 MG tablet Take 1 mg by mouth 2 times daily      lamoTRIgine (LAMICTAL) 100 MG tablet Take 2 tablets (200 mg) by mouth 2 times daily. 360 tablet 1    montelukast (SINGULAIR) 10 MG tablet Take 1 tablet (10 mg) by mouth At Bedtime (Patient taking differently: Take 10 mg by mouth nightly as needed.) 90 tablet 3    Multiple Vitamin (MULTIVITAMINS PO) Take by mouth every morning      mupirocin (BACTROBAN) 2 % external ointment Apply topically 3 times daily. 15 g 0    OLANZapine (ZYPREXA) 5 MG tablet Take 5 mg by mouth as needed      ziprasidone (GEODON) 40 MG capsule Take 1 capsule (40 mg) by mouth 2 times daily (with meals) AM (Patient taking differently: Take 40 mg by mouth 2 times daily (with meals). Total 120 mg) 60 capsule 0    ziprasidone (GEODON) 80 MG capsule Take 1 capsule (80 mg) by mouth 2 times daily (with meals) AM AND PM (Patient taking differently: Take 80 mg by mouth 2 times  daily (with meals). AM AND PM Total 120 mg) 60 capsule 0    zonisamide (ZONEGRAN) 100 MG capsule Take 3 capsules (300 mg) by mouth at bedtime. Take 3 capsules  capsule 1         PAST MEDICAL HISTORY: Autistic disorder, sleep disturbances.       FAMILY HISTORY: No family history of seizures. Grandmother had a history of migraine headaches. Mother had a history of anxiety and panic attacks. Father had a history of chemical dependency.    SOCIAL HISTORY: He is living with his parents. He had special education. He is single. No alcohol, no drug abuse, no smoking. He is not working.     PHYSICAL EXAMINATION:      Not able to exam.     REVIEW OF SYSTEMS: Negative.     PREVIOUS DIAGNOSTIC TESTING: MRI of the brain on 04/30 showed a negative study. EEG on 05/01 showed a normal study.      IMPRESSION:    1. Intractable seizures.      He was last seen about 12 months ago.  He had no seizures since the last visit. His last seizure was in 2022. Zonegran was added in 2022, and no seizures were reported after Zonegran was added.  He is currently taking Lamotrigine 200 mg bid and Depakote 1500 - 1500 mg, Zonegran 300 mg at bedtime. His recent EEG showed occasional bifrontal spike and wave activities.    2. Autistic disorder.    3. Sleep disturbances.      PLAN:    1. Continue Lamotrigine 200 mg bid, Zonegran 300 mg at bedtime and Depakote 1500 mg bid. Refilled.  2. Labs: Lamictal, Zonegran, Depaktoe, AST, Platelet.  3. Return to clinic in 12 months. If seizures recur, will increase Zonegran. Other options include Vimpat, OXC, etc.      The longitudinal plan of care for seizures was addressed during this visit. Due to the added complexity in care, I will continue to support this patient in the subsequent management of this condition and with the ongoing continuity of care of this condition.       16 min total time was spent on the day of this visit.      9 min was spent on face to face time  7 min was spent on preparation of  visit to review charts and labs, ordering medications and tests, and documentation of clinical information

## 2025-03-20 NOTE — LETTER
3/20/2025       RE: Shola Owens Jr.  : 1995   MRN: [id number]      Dear Colleague,    Thank you for referring your patient, Shola Owens Jr., to the [facility] at [facility]. Please see a copy of my visit note below.    Is the patient currently in the state of [place]? YES    Visit mode:VIDEO    If the visit is dropped, the patient can be reconnected by: VIDEO VISIT: Text to cell phone: [phone]    Will anyone else be joining the visit? Yes,patient guardian -Willis    How would you like to obtain your AVS? Mail a copy    Are changes needed to the allergy or medication list? NO    Reason for visit: Follow Up     Patient is at home fore the visit.    Provider is at the [facility] clinic doing this visit.    CHIEF COMPLAINT: Follow up for seizures.     HISTORY OF PRESENT ILLNESS:  Video call for follow up for seizures. His stepfather was on the conference call. This patient is a 29-year-old, right-handed male with a history of autistic spectrum disorder.  The patient himself cannot provide much history, and the majority of the history is provided by his stepfather.  He was last seen about 12 months ago.  He had no seizures since the last visit. His last seizure was in . Zonegran was added in , and no seizures were reported after Zonegran was added.  He is currently taking Lamotrigine 200 mg bid and Depakote 1500 - 1500 mg, Zonegran 300 mg at bedtime. His recent EEG showed occasional bifrontal spike and wave activities.    In , he was having almost one GTC every months. He did not miss any meds. He was seen in the ED in Aug and Dec.  Zonegran was added in Dec. 2022. No seizures since Zonegran was added. He is little bit more irritable.      Patient had seizure onset in 2015, another seizure 10 days after that,  both were described as GTCs.  He was initially started on keppra.  It was stopped because of GI side effects.  Then he  was started on Lamotrigine, Depakote was added later.  He is currently on Lamictal 100mg bid and Depakote 500-1000.  He had behavioral problems when he was on Lamictal 150 mg bid.  His behavior has been really good for the past 2 years.  He had a seizure on 4/12/2016 that caused fractures of the right zygomatic arch, maxillary sinus and orbit.        According to the mother, on 04/30/2015, the patient was found at home on the kitchen floor. He was cyanotic. He had foaming at the mouth. He had some bleeding from the nose. He was unresponsive, and he had some tonic-clonic movement of his extremities. The clonic-tonic movement lasted for about 1 minute. The mother did not see the beginning of the event. There was no loss of bowel or bladder control. The patient did not bite his tongue. Afterwards the patient was very lethargic, still unresponsive until he was in the ambulance; then he was able to recognize his father and was able to communicate a little bit.    The patient had no prior history of seizures. He has been taking Geodon for 7 years. No recent medication changes. No recent illness. When EMS arrived at the scene, the blood sugar was found to be 81.      TRIGGERS FOR SEIZURES: Unclear.    RISK FACTORS FOR SEIZURES: He has a history of autistic spectrum disorder. No history of head trauma with loss of consciousness. No history of CNS infection. No history of febrile convulsions.      Current Outpatient Medications   Medication Sig Dispense Refill     azithromycin (ZITHROMAX) 250 MG tablet Two tablets first day, then one tablet daily for four days. 6 tablet 0     divalproex sodium extended-release (DEPAKOTE ER) 500 MG 24 hr tablet TAKE THREE TABLETS BY MOUTH TWICE A  tablet 1     fluticasone (FLONASE) 50 MCG/ACT nasal spray Spray 1 spray into both nostrils daily. 16 g 1     guanFACINE (TENEX) 1 MG tablet Take 1 mg by mouth 2 times daily       lamoTRIgine (LAMICTAL) 100 MG tablet Take 2 tablets (200 mg) by  mouth 2 times daily. 360 tablet 1     montelukast (SINGULAIR) 10 MG tablet Take 1 tablet (10 mg) by mouth At Bedtime (Patient taking differently: Take 10 mg by mouth nightly as needed.) 90 tablet 3     Multiple Vitamin (MULTIVITAMINS PO) Take by mouth every morning       mupirocin (BACTROBAN) 2 % external ointment Apply topically 3 times daily. 15 g 0     OLANZapine (ZYPREXA) 5 MG tablet Take 5 mg by mouth as needed       ziprasidone (GEODON) 40 MG capsule Take 1 capsule (40 mg) by mouth 2 times daily (with meals) AM (Patient taking differently: Take 40 mg by mouth 2 times daily (with meals). Total 120 mg) 60 capsule 0     ziprasidone (GEODON) 80 MG capsule Take 1 capsule (80 mg) by mouth 2 times daily (with meals) AM AND PM (Patient taking differently: Take 80 mg by mouth 2 times daily (with meals). AM AND PM Total 120 mg) 60 capsule 0     zonisamide (ZONEGRAN) 100 MG capsule Take 3 capsules (300 mg) by mouth at bedtime. Take 3 capsules  capsule 1         PAST MEDICAL HISTORY: Autistic disorder, sleep disturbances.       FAMILY HISTORY: No family history of seizures. Grandmother had a history of migraine headaches. Mother had a history of anxiety and panic attacks. Father had a history of chemical dependency.    SOCIAL HISTORY: He is living with his parents. He had special education. He is single. No alcohol, no drug abuse, no smoking. He is not working.     PHYSICAL EXAMINATION:      Not able to exam.     REVIEW OF SYSTEMS: Negative.     PREVIOUS DIAGNOSTIC TESTING: MRI of the brain on 04/30 showed a negative study. EEG on 05/01 showed a normal study.      IMPRESSION:    1. Intractable seizures.      He was last seen about 12 months ago.  He had no seizures since the last visit. His last seizure was in 2022. Zonegran was added in 2022, and no seizures were reported after Zonegran was added.  He is currently taking Lamotrigine 200 mg bid and Depakote 1500 - 1500 mg, Zonegran 300 mg at bedtime. His recent EEG  showed occasional bifrontal spike and wave activities.    2. Autistic disorder.    3. Sleep disturbances.      PLAN:    1. Continue Lamotrigine 200 mg bid, Zonegran 300 mg at bedtime and Depakote 1500 mg bid. Refilled.  2. Labs: Lamictal, Zonegran, Depaktoe, AST, Platelet.  3. Return to clinic in 12 months. If seizures recur, will increase Zonegran. Other options include Vimpat, OXC, etc.      The longitudinal plan of care for seizures was addressed during this visit. Due to the added complexity in care, I will continue to support this patient in the subsequent management of this condition and with the ongoing continuity of care of this condition.       16 min total time was spent on the day of this visit.      9 min was spent on face to face time  7 min was spent on preparation of visit to review charts and labs, ordering medications and tests, and documentation of clinical information      Again, thank you for allowing me to participate in the care of your patient.      Sincerely,    Austen Lemus MD

## 2025-03-20 NOTE — PATIENT INSTRUCTIONS
PLAN:    1. Continue Lamotrigine 200 mg bid, Zonegran 300 mg at bedtime and Depakote 1500 mg bid.  2. Labs: Lamictal, Zonegran, Depaktoe, AST, Platelet.  3. Return to clinic in 12 months.

## 2025-03-20 NOTE — PROGRESS NOTES
Is the patient currently in the state of [place]? YES    Visit mode:VIDEO    If the visit is dropped, the patient can be reconnected by: VIDEO VISIT: Text to cell phone: [phone]    Will anyone else be joining the visit? Yes,patient guardian -Willis    How would you like to obtain your AVS? Mail a copy    Are changes needed to the allergy or medication list? NO    Reason for visit: Follow Up     Patient is at home fore the visit.    Provider is at the [facility] clinic doing this visit.

## 2025-04-07 ENCOUNTER — LAB (OUTPATIENT)
Dept: LAB | Facility: CLINIC | Age: 30
End: 2025-04-07
Payer: MEDICAID

## 2025-04-07 DIAGNOSIS — G40.909 SEIZURE DISORDER (H): ICD-10-CM

## 2025-04-07 LAB
AST SERPL W P-5'-P-CCNC: 20 U/L (ref 0–45)
PLATELET # BLD AUTO: 170 10E3/UL (ref 150–450)
VALPROATE SERPL-MCNC: 120 UG/ML

## 2025-04-07 PROCEDURE — 36415 COLL VENOUS BLD VENIPUNCTURE: CPT

## 2025-04-07 PROCEDURE — 85049 AUTOMATED PLATELET COUNT: CPT

## 2025-04-07 PROCEDURE — 80175 DRUG SCREEN QUAN LAMOTRIGINE: CPT | Mod: 90

## 2025-04-07 PROCEDURE — 80203 DRUG SCREEN QUANT ZONISAMIDE: CPT | Mod: 90

## 2025-04-07 PROCEDURE — 84450 TRANSFERASE (AST) (SGOT): CPT

## 2025-04-07 PROCEDURE — 80164 ASSAY DIPROPYLACETIC ACD TOT: CPT

## 2025-04-09 LAB
LAMOTRIGINE SERPL-MCNC: 19.2 UG/ML
ZONISAMIDE SERPL-MCNC: 10 UG/ML

## (undated) DEVICE — PACK RECTAL UMMC

## (undated) DEVICE — TAPE MEDIPORE 2"X2YD 2862S

## (undated) DEVICE — SOL WATER IRRIG 1000ML BOTTLE 2F7114

## (undated) DEVICE — LINEN TOWEL PACK X5 5464

## (undated) DEVICE — ESU GROUND PAD ADULT W/CORD E7507

## (undated) DEVICE — GLOVE BIOGEL PI MICRO SZ 7.5 48575

## (undated) DEVICE — LINEN TOWEL PACK X6 WHITE 5487

## (undated) DEVICE — PAD CHUX UNDERPAD 23X24" 7136

## (undated) DEVICE — GLOVE BIOGEL PI MICRO INDICATOR UNDERGLOVE SZ 8.0 48980

## (undated) DEVICE — SUCTION MANIFOLD NEPTUNE 2 SYS 4 PORT 0702-020-000

## (undated) DEVICE — DRSG TEGADERM 4X4 3/4" 1626W

## (undated) DEVICE — SOL ADH LIQUID BENZOIN SWAB 0.6ML C1544

## (undated) DEVICE — ESU CLEANER TIP 31142717

## (undated) RX ORDER — FENTANYL CITRATE 50 UG/ML
INJECTION, SOLUTION INTRAMUSCULAR; INTRAVENOUS
Status: DISPENSED
Start: 2023-01-24

## (undated) RX ORDER — METRONIDAZOLE 500 MG/100ML
INJECTION, SOLUTION INTRAVENOUS
Status: DISPENSED
Start: 2023-01-24

## (undated) RX ORDER — EPHEDRINE SULFATE 50 MG/ML
INJECTION, SOLUTION INTRAMUSCULAR; INTRAVENOUS; SUBCUTANEOUS
Status: DISPENSED
Start: 2023-01-24

## (undated) RX ORDER — CEFAZOLIN SODIUM/WATER 2 G/20 ML
SYRINGE (ML) INTRAVENOUS
Status: DISPENSED
Start: 2023-01-24

## (undated) RX ORDER — ACETAMINOPHEN 325 MG/1
TABLET ORAL
Status: DISPENSED
Start: 2023-01-24

## (undated) RX ORDER — PROPOFOL 10 MG/ML
INJECTION, EMULSION INTRAVENOUS
Status: DISPENSED
Start: 2023-01-24